# Patient Record
Sex: FEMALE | Race: WHITE | NOT HISPANIC OR LATINO | Employment: OTHER
[De-identification: names, ages, dates, MRNs, and addresses within clinical notes are randomized per-mention and may not be internally consistent; named-entity substitution may affect disease eponyms.]

---

## 2017-01-12 PROCEDURE — 80053 COMPREHEN METABOLIC PANEL: CPT | Performed by: INTERNAL MEDICINE

## 2017-01-12 PROCEDURE — 36415 COLL VENOUS BLD VENIPUNCTURE: CPT | Performed by: INTERNAL MEDICINE

## 2017-01-12 PROCEDURE — 85025 COMPLETE CBC W/AUTO DIFF WBC: CPT | Performed by: INTERNAL MEDICINE

## 2017-01-12 PROCEDURE — 83036 HEMOGLOBIN GLYCOSYLATED A1C: CPT | Performed by: INTERNAL MEDICINE

## 2017-01-20 PROBLEM — E11.9 TYPE 2 DIABETES MELLITUS WITHOUT COMPLICATION, WITHOUT LONG-TERM CURRENT USE OF INSULIN (HCC): Status: ACTIVE | Noted: 2017-01-20

## 2017-06-08 ENCOUNTER — HOSPITAL (OUTPATIENT)
Dept: OTHER | Age: 67
End: 2017-06-08
Attending: EMERGENCY MEDICINE

## 2017-06-08 LAB
ALBUMIN SERPL-MCNC: 3.8 GM/DL (ref 3.6–5.1)
ALBUMIN/GLOB SERPL: 1.1 {RATIO} (ref 1–2.4)
ALP SERPL-CCNC: 227 UNIT/L (ref 45–117)
ALT SERPL-CCNC: 618 UNIT/L
ANALYZER ANC (IANC): NORMAL
ANION GAP SERPL CALC-SCNC: 12 MMOL/L (ref 10–20)
ANNOTATION COMMENT IMP: NORMAL
ANNOTATION COMMENT IMP: NORMAL
AST SERPL-CCNC: 410 UNIT/L
BASOPHILS # BLD: 0 THOUSAND/MCL (ref 0–0.3)
BASOPHILS NFR BLD: 0 %
BILIRUB SERPL-MCNC: 1 MG/DL (ref 0.2–1)
BUN SERPL-MCNC: 10 MG/DL (ref 6–20)
BUN/CREAT SERPL: 19 (ref 7–25)
CALCIUM SERPL-MCNC: 9.6 MG/DL (ref 8.4–10.2)
CHLORIDE: 103 MMOL/L (ref 98–107)
CO2 SERPL-SCNC: 29 MMOL/L (ref 21–32)
CREAT SERPL-MCNC: 0.54 MG/DL (ref 0.51–0.95)
DIFFERENTIAL METHOD BLD: NORMAL
EOSINOPHIL # BLD: 0.1 THOUSAND/MCL (ref 0.1–0.5)
EOSINOPHIL NFR BLD: 2 %
ERYTHROCYTE [DISTWIDTH] IN BLOOD: 12.4 % (ref 11–15)
GLOBULIN SER-MCNC: 3.6 GM/DL (ref 2–4)
GLUCOSE SERPL-MCNC: 144 MG/DL (ref 65–99)
HAV IGM SER QL: NEGATIVE
HBV CORE IGM SER QL: NEGATIVE
HBV SURFACE AG SER QL: NEGATIVE
HCV AB SERPL QL IA: NEGATIVE
HEMATOCRIT: 41.2 % (ref 36–46.5)
HGB BLD-MCNC: 14.3 GM/DL (ref 12–15.5)
LYMPHOCYTES # BLD: 2.3 THOUSAND/MCL (ref 1–4)
LYMPHOCYTES NFR BLD: 41 %
MCH RBC QN AUTO: 29.5 PG (ref 26–34)
MCHC RBC AUTO-ENTMCNC: 34.7 GM/DL (ref 32–36.5)
MCV RBC AUTO: 85.1 FL (ref 78–100)
MONOCYTES # BLD: 0.5 THOUSAND/MCL (ref 0.3–0.9)
MONOCYTES NFR BLD: 9 %
NEUTROPHILS # BLD: 2.6 THOUSAND/MCL (ref 1.8–7.7)
NEUTROPHILS NFR BLD: 48 %
NEUTS SEG NFR BLD: NORMAL %
PERCENT NRBC: NORMAL
PLATELET # BLD: 252 THOUSAND/MCL (ref 140–450)
POTASSIUM SERPL-SCNC: 3.9 MMOL/L (ref 3.4–5.1)
PROT SERPL-MCNC: 7.4 GM/DL (ref 6.4–8.2)
RBC # BLD: 4.84 MILLION/MCL (ref 4–5.2)
SODIUM SERPL-SCNC: 140 MMOL/L (ref 135–145)
WBC # BLD: 5.5 THOUSAND/MCL (ref 4.2–11)

## 2017-06-15 PROCEDURE — 80076 HEPATIC FUNCTION PANEL: CPT | Performed by: INTERNAL MEDICINE

## 2017-06-15 PROCEDURE — 36415 COLL VENOUS BLD VENIPUNCTURE: CPT | Performed by: INTERNAL MEDICINE

## 2017-07-26 ENCOUNTER — HOSPITAL (OUTPATIENT)
Dept: OTHER | Age: 67
End: 2017-07-26
Attending: INTERNAL MEDICINE

## 2017-07-26 LAB — GLUCOSE BLDC GLUCOMTR-MCNC: 169 MG/DL (ref 65–99)

## 2017-07-27 ENCOUNTER — CHARTING TRANS (OUTPATIENT)
Dept: OTHER | Age: 67
End: 2017-07-27

## 2017-10-04 PROBLEM — K86.2 PANCREAS CYST (HCC): Status: ACTIVE | Noted: 2017-10-04

## 2017-10-04 PROBLEM — K52.9 CHRONIC DIARRHEA: Status: ACTIVE | Noted: 2017-10-04

## 2017-10-04 PROBLEM — K86.2 PANCREAS CYST: Status: ACTIVE | Noted: 2017-10-04

## 2018-01-26 ENCOUNTER — OFFICE VISIT (OUTPATIENT)
Dept: OTOLARYNGOLOGY | Facility: CLINIC | Age: 68
End: 2018-01-26

## 2018-01-26 VITALS
SYSTOLIC BLOOD PRESSURE: 156 MMHG | DIASTOLIC BLOOD PRESSURE: 84 MMHG | WEIGHT: 158 LBS | BODY MASS INDEX: 29.83 KG/M2 | TEMPERATURE: 97 F | HEIGHT: 61 IN

## 2018-01-26 DIAGNOSIS — R04.0 EPISTAXIS: Primary | ICD-10-CM

## 2018-01-26 PROCEDURE — 30901 CONTROL OF NOSEBLEED: CPT | Performed by: OTOLARYNGOLOGY

## 2018-01-26 PROCEDURE — 99203 OFFICE O/P NEW LOW 30 MIN: CPT | Performed by: OTOLARYNGOLOGY

## 2018-01-26 RX ORDER — DILTIAZEM HYDROCHLORIDE 240 MG/1
CAPSULE, EXTENDED RELEASE ORAL
COMMUNITY
Start: 2018-01-22 | End: 2019-06-14

## 2018-01-26 RX ORDER — LACTOBACIL 2/BIFIDO 1/S.THERMO 450B CELL
PACKET (EA) ORAL DAILY
COMMUNITY
Start: 2018-01-22

## 2018-01-26 NOTE — PROGRESS NOTES
Verena Martinez is a 69year old female. Patient presents with:  Epistaxis: nose bleed at her left nostril for a week    HPI:   She was last seen in 2011 following episodes of recurrent epistaxis from the right side of her nose.   She had done very well by mouth daily.  Disp:  Rfl:    Triamterene-HCTZ (DYAZIDE) 37.5-25 MG Oral Cap TAKE ONE CAPSULE BY MOUTH ONCE DAILY IN THE MORNING AS NEEDED FOR EDEMA Disp: 90 capsule Rfl: 0   CARTIA  MG Oral Capsule SR 24 Hr  Disp:  Rfl:    Probiotic Product (VSL#3) Right: Normal, Left: Normal. Pupil - Right: Normal, Left: Normal.    Ears Normal Inspection - Right: Normal, Left: Normal. Canal - Left: Normal. TM - Right: Normal, Left: Normal.     Procedure:  After informed consent obtained, topical anesthesia consistin

## 2018-02-12 ENCOUNTER — OFFICE VISIT (OUTPATIENT)
Dept: OTOLARYNGOLOGY | Facility: CLINIC | Age: 68
End: 2018-02-12

## 2018-02-12 VITALS
WEIGHT: 158 LBS | SYSTOLIC BLOOD PRESSURE: 158 MMHG | BODY MASS INDEX: 29.83 KG/M2 | DIASTOLIC BLOOD PRESSURE: 89 MMHG | TEMPERATURE: 98 F | HEIGHT: 61 IN

## 2018-02-12 DIAGNOSIS — R04.0 EPISTAXIS: Primary | ICD-10-CM

## 2018-02-12 PROCEDURE — 30901 CONTROL OF NOSEBLEED: CPT | Performed by: OTOLARYNGOLOGY

## 2018-02-12 PROCEDURE — 99213 OFFICE O/P EST LOW 20 MIN: CPT | Performed by: OTOLARYNGOLOGY

## 2018-02-12 NOTE — PROGRESS NOTES
Verena Martinez is a 69year old female. Patient presents with:   Follow - Up: 3 week follow up- nose bleed at her left nostril- per pt she 2x of nose bleed since 1/26/18    HPI:   She started having bleeding again from the left side of her nose over the ONCE DAILY IN THE MORNING AS NEEDED FOR EDEMA Disp: 90 capsule Rfl: 0      Past Medical History:   Diagnosis Date   • Chronic diarrhea 10/4/2017   • Other and unspecified hyperlipidemia    • Pancreas cyst 10/4/2017   • Type II or unspecified type diabetes - Left: Normal. TM - Right: Normal, Left: Normal.     Procedure:  After informed consent obtained, topical anesthesia consisting of lidocaine with epinephrine was placed into the left nasal cavity.  Silver nitrate was used to cauterize the left bleeding sit

## 2018-04-27 PROCEDURE — 87186 SC STD MICRODIL/AGAR DIL: CPT | Performed by: INTERNAL MEDICINE

## 2018-04-27 PROCEDURE — 87086 URINE CULTURE/COLONY COUNT: CPT | Performed by: INTERNAL MEDICINE

## 2018-04-27 PROCEDURE — 87184 SC STD DISK METHOD PER PLATE: CPT | Performed by: INTERNAL MEDICINE

## 2018-04-27 PROCEDURE — 81001 URINALYSIS AUTO W/SCOPE: CPT | Performed by: INTERNAL MEDICINE

## 2018-04-27 PROCEDURE — 87088 URINE BACTERIA CULTURE: CPT | Performed by: INTERNAL MEDICINE

## 2018-05-16 PROCEDURE — 87086 URINE CULTURE/COLONY COUNT: CPT | Performed by: INTERNAL MEDICINE

## 2018-05-16 PROCEDURE — 81001 URINALYSIS AUTO W/SCOPE: CPT | Performed by: INTERNAL MEDICINE

## 2018-05-16 PROCEDURE — 87077 CULTURE AEROBIC IDENTIFY: CPT | Performed by: INTERNAL MEDICINE

## 2018-10-24 PROBLEM — E11.319 DIABETIC RETINOPATHY OF LEFT EYE (HCC): Status: ACTIVE | Noted: 2018-10-01

## 2018-10-29 ENCOUNTER — OFFICE VISIT (OUTPATIENT)
Dept: FAMILY MEDICINE CLINIC | Facility: CLINIC | Age: 68
End: 2018-10-29
Payer: MEDICARE

## 2018-10-29 VITALS
HEART RATE: 94 BPM | TEMPERATURE: 98 F | RESPIRATION RATE: 15 BRPM | OXYGEN SATURATION: 98 % | DIASTOLIC BLOOD PRESSURE: 82 MMHG | SYSTOLIC BLOOD PRESSURE: 148 MMHG

## 2018-10-29 DIAGNOSIS — R39.9 UTI SYMPTOMS: Primary | ICD-10-CM

## 2018-10-29 PROCEDURE — 87186 SC STD MICRODIL/AGAR DIL: CPT | Performed by: PHYSICIAN ASSISTANT

## 2018-10-29 PROCEDURE — 87088 URINE BACTERIA CULTURE: CPT | Performed by: PHYSICIAN ASSISTANT

## 2018-10-29 PROCEDURE — 87086 URINE CULTURE/COLONY COUNT: CPT | Performed by: PHYSICIAN ASSISTANT

## 2018-10-29 PROCEDURE — 99213 OFFICE O/P EST LOW 20 MIN: CPT | Performed by: PHYSICIAN ASSISTANT

## 2018-10-29 RX ORDER — NITROFURANTOIN 25; 75 MG/1; MG/1
100 CAPSULE ORAL 2 TIMES DAILY
Qty: 10 CAPSULE | Refills: 0 | Status: SHIPPED | OUTPATIENT
Start: 2018-10-29 | End: 2018-11-03

## 2018-10-29 NOTE — PROGRESS NOTES
CHIEF COMPLAINT:   Patient presents with:  Dysuria      HPI:   Verena Martinez is a 77year old female who presents with symptoms of UTI. Complaining of urinary frequency, urgency, dysuria for last 1 days. Symptoms have been worsening since onset.   Shade An Glucose Blood (ACCU-CHEK TYRONE PLUS) In Vitro Strip Test Blood Sugar Once Daily. Non-Insulin Dependent Diabetes Type II. E11.9. Disp: 100 strip Rfl: 3   Blood Glucose Monitoring Suppl (ACCU-CHEK TYRONE) Does not apply Device Test Blood Sugar Once Daily.  Non EYES:denies blurred vision or double vision  HEENT: no congestion, rhinorrhea, sore throat or ear pain  CARDIOVASCULAR: denies chest pain or palpitations  LUNGS: denies shortness of breath, cough, or wheezing  GI: See HPI. No N/V/C/D. : See HPI.   NEURO If you are a woman, it is important to:   Keep the area around your vagina clean. Wipe from front to back after you go to the bathroom. Gently wash the area around your vagina when you bathe or shower. Wear cotton underwear.    Use pantyhose with cott Bladder infections are not contagious. You can't get one from someone else, from a toilet seat, or from sharing a bath. The most common cause of bladder infections is bacteria from the bowels.  The bacteria get onto the skin around the opening of the ureth · Proper cleaning after going to the bathroom is important. Wipe from front to back after using the toilet to prevent the spread of bacteria. · Urinate more often. Don't try to hold urine in for a long time.   · Wear loose-fitting clothes and cotton underw © 5335-1200 The [address]. [address] [facility], [address] [place]. All rights reserved. This information is not intended as a substitute for professional medical care. Always follow your healthcare professional's instructions.               Jose Alberto Powers

## 2018-10-29 NOTE — PATIENT INSTRUCTIONS
-Push fluids- gatorade, water, cranberry juice.  -Will call in 1-3 days with urine culture results  -If have increased urinary urgency, urinary frequency, blood in urine, fevers, chills, sweats, back pain, or abdominal pain, please seek medical care immedi urine  · Abdominal discomfort. This is usually in the lower abdomen above the pubic bone.   · Cloudy urine  · Strong- or bad-smelling urine  · Unable to urinate (urinary retention)  · Unable to hold urine in (urinary incontinence)  · Fever  · Loss of appeti clothing. Care and prevention  These self-care steps can help prevent future infections:  · Drink plenty of fluids to prevent dehydration and flush out your bladder.  Do this unless you must restrict fluids for other health reasons, or your doctor told you (labia)  Date Last Reviewed: 10/1/2016  © 1152-8128 [employer] [address]. [address] [facility] [place], [address] [place]. All rights reserved. This information is not intended as a substitute for professional medical care.  Always follow your healthcare pro

## 2018-10-31 ENCOUNTER — TELEPHONE (OUTPATIENT)
Dept: FAMILY MEDICINE CLINIC | Facility: CLINIC | Age: 68
End: 2018-10-31

## 2018-10-31 NOTE — TELEPHONE ENCOUNTER
Spoke with patient, informed her that her culture revealsed e-coli in urine and that prescribed medication will be eradicate  the bacteria.  Educated patient to continue to maintain hydration with water, avoiding caffeine, alcohol and carbonated beverages w

## 2018-11-23 ENCOUNTER — OFFICE VISIT (OUTPATIENT)
Dept: FAMILY MEDICINE CLINIC | Facility: CLINIC | Age: 68
End: 2018-11-23
Payer: MEDICARE

## 2018-11-23 VITALS
RESPIRATION RATE: 18 BRPM | TEMPERATURE: 98 F | OXYGEN SATURATION: 98 % | DIASTOLIC BLOOD PRESSURE: 84 MMHG | BODY MASS INDEX: 27.49 KG/M2 | WEIGHT: 161 LBS | HEART RATE: 90 BPM | SYSTOLIC BLOOD PRESSURE: 130 MMHG | HEIGHT: 64 IN

## 2018-11-23 DIAGNOSIS — J01.00 ACUTE NON-RECURRENT MAXILLARY SINUSITIS: Primary | ICD-10-CM

## 2018-11-23 DIAGNOSIS — Z87.891 FORMER SMOKER: ICD-10-CM

## 2018-11-23 PROCEDURE — 99213 OFFICE O/P EST LOW 20 MIN: CPT | Performed by: NURSE PRACTITIONER

## 2018-11-23 RX ORDER — AMOXICILLIN AND CLAVULANATE POTASSIUM 875; 125 MG/1; MG/1
1 TABLET, FILM COATED ORAL 2 TIMES DAILY
Qty: 20 TABLET | Refills: 0 | Status: SHIPPED | OUTPATIENT
Start: 2018-11-23 | End: 2018-12-03

## 2018-11-23 NOTE — PROGRESS NOTES
CHIEF COMPLAINT:   Patient presents with:  URI  Nasal Congestion  Chest Congestion      HPI:   Allison Cooper is a 77year old female who presents for cold symptoms for  10  days.  Symptoms started with runny nose, head congestion, body aches, headache Glucose Blood (FREESTYLE TEST) In Vitro Strip Test Blood Sugar Once Daily. Non-Insulin Dependent Diabetes Type II. E11.9. Disp: 100 strip Rfl: 3   Glucose Blood (ACCU-CHEK TYRONE PLUS) In Vitro Strip Test Blood Sugar Once Daily.  Non-Insulin Dependent Diabet • OTHER  07/2017    EUS aspiration of pancreatic cyst per Dr. Oj Muñoz at [facility]  no cytology found   • OTHER SURGICAL HISTORY      excision of lipomas       Family History   Problem Relation Age of Onset   • Cancer Mother         leukemia    • Other (CVD) Father LUNGS: clear to auscultation bilaterally, no wheezes or rhonchi. Breathing is non labored. CARDIO: RRR without murmur  EXTREMITIES: no cyanosis, clubbing or edema  LYMPH:  No cervical lymphadenopathy. NEURO:  No focal deficits.     ASSESSMENT AND PLAN: · Cepacol lozenges or Chloroseptic throat spray (active ingredient Benzocaine) may help soothe throat during the day. · Follow up in a few days or sooner if worsening symptoms. Seek immediate care if inability to swallow or breathe.     Sinusitis (Antibiot · You can use an over-the-counter decongestant, unless a similar medicine was prescribed to you. Nasal sprays work the fastest. Use one that contains phenylephrine or oxymetazoline. First blow your nose gently. Then use the spray.  Do not use these medicine · Don’t have close contact with people who have sore throats, colds, or other upper respiratory infections. · Don’t smoke, and stay away from secondhand smoke. · Stay up to date with of your vaccines.   Date Last Reviewed: 11/1/2017  © 7868-8702 The [employer] Your doctor may prescribe medications to help treat your sinusitis. If you have an infection, antibiotics can help clear it up. If you are prescribed antibiotics, take all pills on schedule until they are gone, even if you feel better.  Decongestants help r

## 2018-11-23 NOTE — PATIENT INSTRUCTIONS
· Take antibiotics as directed. Finish all the medication even if you feel better. · Probiotics or yogurt daily during antibiotic use will help decrease stomach upset and restore good bacteria to the gut. Separate times by at least 2-4 hours.   · Continue may help thin nasal mucus. It also may help your sinuses drain fluids. · Heat may help soothe painful areas of your face. Use a towel soaked in hot water. Or,  the shower and direct the warm spray onto your face.  Using a vaporizer along with a men worse  · Stiff neck  · Unusual drowsiness or confusion  · Swelling of your forehead or eyelids  · Vision problems, such as blurred or double vision  · Fever of 100.4ºF (38ºC) or higher, or as directed by your healthcare provider  · Seizure  · Breathing pro comfortable. Use a hot water bottle or a hand towel dipped in hot water. Some people also find ice packs effective for relieving pain. Medicines  Your doctor may prescribe medications to help treat your sinusitis.  If you have an infection, antibiotics can

## 2018-12-08 ENCOUNTER — OFFICE VISIT (OUTPATIENT)
Dept: FAMILY MEDICINE CLINIC | Facility: CLINIC | Age: 68
End: 2018-12-08
Payer: MEDICARE

## 2018-12-08 VITALS
WEIGHT: 160 LBS | BODY MASS INDEX: 30.21 KG/M2 | SYSTOLIC BLOOD PRESSURE: 150 MMHG | HEART RATE: 86 BPM | TEMPERATURE: 98 F | HEIGHT: 61 IN | DIASTOLIC BLOOD PRESSURE: 90 MMHG | OXYGEN SATURATION: 98 % | RESPIRATION RATE: 16 BRPM

## 2018-12-08 DIAGNOSIS — N30.01 ACUTE CYSTITIS WITH HEMATURIA: Primary | ICD-10-CM

## 2018-12-08 PROCEDURE — 99213 OFFICE O/P EST LOW 20 MIN: CPT | Performed by: NURSE PRACTITIONER

## 2018-12-08 PROCEDURE — 87186 SC STD MICRODIL/AGAR DIL: CPT | Performed by: NURSE PRACTITIONER

## 2018-12-08 PROCEDURE — 81003 URINALYSIS AUTO W/O SCOPE: CPT | Performed by: NURSE PRACTITIONER

## 2018-12-08 PROCEDURE — 87086 URINE CULTURE/COLONY COUNT: CPT | Performed by: NURSE PRACTITIONER

## 2018-12-08 PROCEDURE — 87088 URINE BACTERIA CULTURE: CPT | Performed by: NURSE PRACTITIONER

## 2018-12-08 RX ORDER — NITROFURANTOIN 25; 75 MG/1; MG/1
100 CAPSULE ORAL 2 TIMES DAILY
Qty: 10 CAPSULE | Refills: 0 | Status: SHIPPED | OUTPATIENT
Start: 2018-12-08 | End: 2018-12-13

## 2018-12-08 NOTE — PROGRESS NOTES
CHIEF COMPLAINT:   Patient presents with:  Dysuria      HPI:   Tyrell Rosen is a 77year old female who presents with symptoms of UTI. Complaining of urinary frequency, urgency, dysuria sicne 1p.m. today. Symptoms have been worsening since onset. Glucose Blood (ACCU-CHEK TYRONE) In Vitro Strip Test Blood Sugar Once Daily. Non-Insulin Dependent Diabetes Type II. E11.9. Disp: 100 strip Rfl: 3   ACCU-CHEK SOFTCLIX LANCETS Does not apply Misc Test Blood Sugar Once Daily.  Non-Insulin Dependent Diabetes T GI: See HPI. No N/V/C/D. : See HPI.       EXAM:   /90   Pulse 86   Temp 97.7 °F (36.5 °C)   Resp 16   Ht 61\"   Wt 160 lb   SpO2 98%   BMI 30.23 kg/m²   GENERAL: well developed, well nourished,in no apparent distress  CARDIO: RRR, no murmurs  LUNG Sig: Take 1 capsule (100 mg total) by mouth 2 (two) times daily for 5 days. Risk and benefits of medication discussed. Stressed importance of completing full course of antibiotic.      Patient Instructions       Understanding Urinary Tract Infection

## 2018-12-19 ENCOUNTER — OFFICE VISIT (OUTPATIENT)
Dept: SURGERY | Facility: CLINIC | Age: 68
End: 2018-12-19
Payer: MEDICARE

## 2018-12-19 VITALS
TEMPERATURE: 99 F | HEIGHT: 61 IN | WEIGHT: 160 LBS | SYSTOLIC BLOOD PRESSURE: 150 MMHG | HEART RATE: 64 BPM | DIASTOLIC BLOOD PRESSURE: 68 MMHG | BODY MASS INDEX: 30.21 KG/M2

## 2018-12-19 DIAGNOSIS — N39.46 MIXED STRESS AND URGE URINARY INCONTINENCE: ICD-10-CM

## 2018-12-19 DIAGNOSIS — N39.0 FREQUENT UTI: Primary | ICD-10-CM

## 2018-12-19 PROCEDURE — 81003 URINALYSIS AUTO W/O SCOPE: CPT | Performed by: NURSE PRACTITIONER

## 2018-12-19 PROCEDURE — 99204 OFFICE O/P NEW MOD 45 MIN: CPT | Performed by: NURSE PRACTITIONER

## 2018-12-19 PROCEDURE — G0463 HOSPITAL OUTPT CLINIC VISIT: HCPCS | Performed by: NURSE PRACTITIONER

## 2018-12-19 RX ORDER — NITROFURANTOIN 25; 75 MG/1; MG/1
CAPSULE ORAL
Qty: 14 CAPSULE | Refills: 1 | Status: SHIPPED | OUTPATIENT
Start: 2018-12-19 | End: 2019-01-08

## 2018-12-19 NOTE — PROGRESS NOTES
HPI:    Patient ID: Manuel Young is a 77year old female. Patient is a 77year old female who presents to the clinic for a consult regarding frequent UTI. Patient states she has had 4 UTIs in the past year.   She suffers from irritable bowel disea SR 24 Hr TAKE ONE CAPSULE BY MOUTH ONCE DAILY Disp: 90 capsule Rfl: 3   DilTIAZem HCl  MG Oral Capsule SR 24 Hr TAKE ONE CAPSULE BY MOUTH ONCE DAILY IN THE MORNING Disp: 90 capsule Rfl: 3   estradiol 0.5 MG Oral Tab TAKE ONE TABLET BY MOUTH ONCE WALLACE mouth. Disp:  Rfl:    Triamterene-HCTZ (DYAZIDE) 37.5-25 MG Oral Cap TAKE ONE CAPSULE BY MOUTH ONCE DAILY IN THE MORNING AS NEEDED FOR EDEMA Disp: 90 capsule Rfl: 0     Allergies:  Sulfa Antibiotics       HIVES  Ditropan [Oxybutyni*    HIVES, RASH  Bactrim Neck: Normal range of motion. Cardiovascular: Normal rate. No murmur heard. Pulmonary/Chest: Effort normal and breath sounds normal. No respiratory distress. Abdominal: Soft. Bowel sounds are normal. There is no tenderness.    Genitourinary:   Esther Monohyd Macro 100 MG Oral Cap 14 capsule 1     Sig: Take 1 capsule by mouth twice daily x 7 days whenever you have symptoms of a urinary tract infection. Please submit urine to lab prior.        Imaging & Referrals:  [facility]/BLADDER (CPT=[id number])        ID

## 2018-12-24 ENCOUNTER — APPOINTMENT (OUTPATIENT)
Dept: LAB | Facility: HOSPITAL | Age: 68
End: 2018-12-24
Attending: NURSE PRACTITIONER
Payer: MEDICARE

## 2018-12-24 DIAGNOSIS — N39.46 MIXED STRESS AND URGE URINARY INCONTINENCE: ICD-10-CM

## 2018-12-24 DIAGNOSIS — N39.0 FREQUENT UTI: ICD-10-CM

## 2018-12-24 PROCEDURE — 87086 URINE CULTURE/COLONY COUNT: CPT

## 2018-12-24 PROCEDURE — 87077 CULTURE AEROBIC IDENTIFY: CPT

## 2018-12-24 PROCEDURE — 87186 SC STD MICRODIL/AGAR DIL: CPT

## 2018-12-24 PROCEDURE — 81001 URINALYSIS AUTO W/SCOPE: CPT

## 2018-12-26 ENCOUNTER — TELEPHONE (OUTPATIENT)
Dept: SURGERY | Facility: CLINIC | Age: 68
End: 2018-12-26

## 2018-12-26 RX ORDER — CEPHALEXIN 500 MG/1
500 CAPSULE ORAL 2 TIMES DAILY
Qty: 14 CAPSULE | Refills: 0 | Status: SHIPPED | OUTPATIENT
Start: 2018-12-26 | End: 2019-01-02

## 2018-12-26 NOTE — TELEPHONE ENCOUNTER
Staff,    Please let patient know her urine culture is positive for UTI. It does have some antibiotic resistance. We will plan to treat with Keflex 500 mg BID x 7 days.      Thank you,  Elaine OVALLE

## 2018-12-26 NOTE — TELEPHONE ENCOUNTER
Was asked by JULIA, to speak with pt. Spoke with pt. She states she received a call from her pharmacy and did not know why. Read to her APN's note as outlined below. She verbalized understanding. She states she is currently taking Nitrofurantoin.  Upon review

## 2019-01-03 ENCOUNTER — HOSPITAL ENCOUNTER (OUTPATIENT)
Dept: ULTRASOUND IMAGING | Facility: HOSPITAL | Age: 69
Discharge: HOME | End: 2019-01-03
Attending: NURSE PRACTITIONER
Payer: MEDICARE

## 2019-01-03 DIAGNOSIS — N39.46 MIXED STRESS AND URGE URINARY INCONTINENCE: ICD-10-CM

## 2019-01-03 DIAGNOSIS — N39.0 FREQUENT UTI: ICD-10-CM

## 2019-01-03 PROCEDURE — 76770 US EXAM ABDO BACK WALL COMP: CPT | Performed by: NURSE PRACTITIONER

## 2019-01-06 ENCOUNTER — APPOINTMENT (OUTPATIENT)
Dept: LAB | Facility: HOSPITAL | Age: 69
End: 2019-01-06
Attending: NURSE PRACTITIONER
Payer: MEDICARE

## 2019-01-06 DIAGNOSIS — N39.0 FREQUENT UTI: ICD-10-CM

## 2019-01-06 DIAGNOSIS — N39.46 MIXED STRESS AND URGE URINARY INCONTINENCE: ICD-10-CM

## 2019-01-06 LAB
BILIRUB UR QL: NEGATIVE
GLUCOSE UR-MCNC: NEGATIVE MG/DL
KETONES UR-MCNC: NEGATIVE MG/DL
NITRITE UR QL STRIP.AUTO: NEGATIVE
PH UR: 5 [PH] (ref 5–8)
PROT UR-MCNC: 100 MG/DL
RBC #/AREA URNS AUTO: 3835 /HPF
SP GR UR STRIP: 1.02 (ref 1–1.03)
UROBILINOGEN UR STRIP-ACNC: <2
VIT C UR-MCNC: 20 MG/DL
WBC #/AREA URNS AUTO: 612 /HPF

## 2019-01-06 PROCEDURE — 81001 URINALYSIS AUTO W/SCOPE: CPT

## 2019-01-06 PROCEDURE — 87088 URINE BACTERIA CULTURE: CPT

## 2019-01-06 PROCEDURE — 87186 SC STD MICRODIL/AGAR DIL: CPT

## 2019-01-06 PROCEDURE — 87086 URINE CULTURE/COLONY COUNT: CPT

## 2019-01-07 ENCOUNTER — TELEPHONE (OUTPATIENT)
Dept: SURGERY | Facility: CLINIC | Age: 69
End: 2019-01-07

## 2019-01-08 RX ORDER — NITROFURANTOIN 25; 75 MG/1; MG/1
100 CAPSULE ORAL NIGHTLY
Qty: 90 CAPSULE | Refills: 1 | Status: SHIPPED | OUTPATIENT
Start: 2019-01-08 | End: 2019-06-14

## 2019-01-08 NOTE — TELEPHONE ENCOUNTER
Patient with positive UTI. This is her 6 UTI in the past year. She has started Macrobid as prescribed at [address] [place] [address] and states her symptoms are improving. I discussed a trial of suppressive therapy with Macrobid. Patient with IBS and bouts of diarrhea.   Radha Ireland

## 2019-03-08 ENCOUNTER — TELEPHONE (OUTPATIENT)
Dept: SURGERY | Facility: CLINIC | Age: 69
End: 2019-03-08

## 2019-03-08 DIAGNOSIS — N39.0 RECURRENT UTI: Primary | ICD-10-CM

## 2019-03-08 NOTE — TELEPHONE ENCOUNTER
Pt wants APN to know that she is almost done taking her antibiotic, and she wants to know if a valid order to get urine test can be entered in Epic?

## 2019-04-07 ENCOUNTER — APPOINTMENT (OUTPATIENT)
Dept: LAB | Facility: HOSPITAL | Age: 69
End: 2019-04-07
Attending: NURSE PRACTITIONER
Payer: MEDICARE

## 2019-04-07 DIAGNOSIS — N39.0 RECURRENT UTI: ICD-10-CM

## 2019-04-07 PROCEDURE — 87077 CULTURE AEROBIC IDENTIFY: CPT

## 2019-04-07 PROCEDURE — 87086 URINE CULTURE/COLONY COUNT: CPT

## 2019-04-07 PROCEDURE — 81001 URINALYSIS AUTO W/SCOPE: CPT

## 2019-04-07 PROCEDURE — 87186 SC STD MICRODIL/AGAR DIL: CPT

## 2019-04-08 ENCOUNTER — TELEPHONE (OUTPATIENT)
Dept: SURGERY | Facility: CLINIC | Age: 69
End: 2019-04-08

## 2019-04-09 ENCOUNTER — TELEPHONE (OUTPATIENT)
Dept: SURGERY | Facility: CLINIC | Age: 69
End: 2019-04-09

## 2019-04-09 DIAGNOSIS — N39.0 RECURRENT UTI: Primary | ICD-10-CM

## 2019-04-09 NOTE — TELEPHONE ENCOUNTER
Patient urine culture positive for klebsiella pneumoniae. She has been on suppressive therapy with Macrobid. She had stopped Macrobid to obtain culture. She started to have symptoms on Sunday prompting her to submit a urine sample.   She restarted Macro

## 2019-04-17 ENCOUNTER — OFFICE VISIT (OUTPATIENT)
Dept: SURGERY | Facility: CLINIC | Age: 69
End: 2019-04-17
Payer: MEDICARE

## 2019-04-17 VITALS
SYSTOLIC BLOOD PRESSURE: 164 MMHG | WEIGHT: 156 LBS | HEART RATE: 93 BPM | BODY MASS INDEX: 29 KG/M2 | TEMPERATURE: 98 F | DIASTOLIC BLOOD PRESSURE: 98 MMHG

## 2019-04-17 DIAGNOSIS — R35.0 URINARY FREQUENCY: ICD-10-CM

## 2019-04-17 DIAGNOSIS — N39.46 MIXED INCONTINENCE: ICD-10-CM

## 2019-04-17 DIAGNOSIS — N39.0 RECURRENT UTI: Primary | ICD-10-CM

## 2019-04-17 PROCEDURE — 99213 OFFICE O/P EST LOW 20 MIN: CPT | Performed by: NURSE PRACTITIONER

## 2019-04-17 PROCEDURE — G0463 HOSPITAL OUTPT CLINIC VISIT: HCPCS | Performed by: NURSE PRACTITIONER

## 2019-04-17 NOTE — PROGRESS NOTES
HPI:    Patient ID: Rosangela Zheng is a 77year old female. HPI     Patient is a 77year old female who presents to the clinic for a follow up regarding frequent UTI.   Patient was last seen in the office 12/19/18 with a history of 4 UTIs over the la BY MOUTH EVERY 4 TO 6 HOURS AS NEEDED Disp: 15 capsule Rfl: 2   METFORMIN  MG Oral Tab TAKE TWO TABLETS BY MOUTH TWICE DAILY WITH MEALS Disp: 360 tablet Rfl: 3   Nitrofurantoin Monohyd Macro 100 MG Oral Cap Take 1 capsule (100 mg total) by mouth nig VITAMIN/MINERALS) Oral Tab Take by mouth.  Disp:  Rfl:      Allergies:  Sulfa Antibiotics       HIVES  Ditropan [Oxybutyni*    HIVES, RASH  Bactrim [Sulfametho*    MYALGIA  Prednisone              HIVES    HISTORY:  Past Medical History:   Diagnosis Date distress. Musculoskeletal: Normal range of motion. Neurological: She is alert and oriented to person, place, and time. Skin: Skin is warm and dry. Psychiatric: She has a normal mood and affect.             ASSESSMENT/PLAN:   Recurrent uti  (primary

## 2019-04-19 PROBLEM — N39.0 RECURRENT UTI: Status: ACTIVE | Noted: 2019-04-19

## 2019-04-19 PROBLEM — N32.81 OAB (OVERACTIVE BLADDER): Status: ACTIVE | Noted: 2019-04-19

## 2019-04-22 ENCOUNTER — OFFICE VISIT (OUTPATIENT)
Dept: SURGERY | Facility: CLINIC | Age: 69
End: 2019-04-22
Payer: MEDICARE

## 2019-04-22 VITALS
BODY MASS INDEX: 29.45 KG/M2 | WEIGHT: 156 LBS | HEART RATE: 76 BPM | HEIGHT: 61 IN | SYSTOLIC BLOOD PRESSURE: 162 MMHG | DIASTOLIC BLOOD PRESSURE: 81 MMHG

## 2019-04-22 DIAGNOSIS — N39.46 MIXED INCONTINENCE: Primary | ICD-10-CM

## 2019-04-22 DIAGNOSIS — R35.0 URINARY FREQUENCY: ICD-10-CM

## 2019-04-22 DIAGNOSIS — N39.0 RECURRENT UTI: ICD-10-CM

## 2019-04-22 PROCEDURE — G0463 HOSPITAL OUTPT CLINIC VISIT: HCPCS | Performed by: UROLOGY

## 2019-04-22 PROCEDURE — 99213 OFFICE O/P EST LOW 20 MIN: CPT | Performed by: UROLOGY

## 2019-04-22 NOTE — PROGRESS NOTES
Paty Samson is a 77year old female. HPI:   Patient presents with: Follow - Up: Patient presents today to discuss surgical options he regards to her urinary symptoms.        77year old female with baseline moderate to severe overactive bladder s 07/2017    EUS aspiration of pancreatic cyst per Dr. Donna Ferreira at [facility]  no cytology found   • OTHER SURGICAL HISTORY      excision of lipomas       Family History   Problem Relation Age of Onset   • Cancer Mother         leukemia    • Other (CVD) Father    • Othe Glucose Blood (FREESTYLE TEST) In Vitro Strip Test Blood Sugar Once Daily. Non-Insulin Dependent Diabetes Type II. E11.9. Disp: 100 strip Rfl: 3   Glucose Blood (ACCU-CHEK TYRONE PLUS) In Vitro Strip Test Blood Sugar Once Daily.  Non-Insulin Dependent Diab retention were discussed with the patient. He would like to consider those options and will call us back to schedule once she is ready.   I also told the patient that I feel that her significant incontinence is likely a contributing factor to her recurrent

## 2019-04-24 ENCOUNTER — TELEPHONE (OUTPATIENT)
Dept: SURGERY | Facility: CLINIC | Age: 69
End: 2019-04-24

## 2019-04-24 DIAGNOSIS — N32.81 OVERACTIVE BLADDER: Primary | ICD-10-CM

## 2019-04-24 NOTE — TELEPHONE ENCOUNTER
Dr. Elli Townsend please see telephone encounter, please route surgery request, for cysto, botox injection, so that I can get the patient scheduled, patient is allergic to sulfa antibiotics, Ditropan Bactrim and prednisone.   Thanks [employer]

## 2019-05-03 ENCOUNTER — TELEPHONE (OUTPATIENT)
Dept: SURGERY | Facility: CLINIC | Age: 69
End: 2019-05-03

## 2019-05-03 DIAGNOSIS — R39.9 SYMPTOM INVOLVING BLADDER: Primary | ICD-10-CM

## 2019-05-03 NOTE — TELEPHONE ENCOUNTER
Pt called. Pt was talking to dr Malu Deras regarding the leaky bladder problem. Pt also has questions for conner medina.   Please call to advise

## 2019-05-03 NOTE — TELEPHONE ENCOUNTER
Phoned pt back and spoke with her. She states she would like to speak with the surgery scheduler to schedule her procedure. She states she is getting anxious to get scheduled. Reassured her that our surgery scheduler is aware and will be contacting her. She is thankful. She then asked if she should refill her Nitrofurantoin. Advised pt to continue on her med, as ordered, unless told otherwise. She verbalized understanding and agrees to plan. She is thankful. Eulalio Deal verbally informed. Thank you, Eulalio Deal.   (Changed message from acute to schedule surgery)

## 2019-05-06 NOTE — TELEPHONE ENCOUNTER
Spoke to patient schedule cystoscopy, Botox injection for Wednesday, June 19 at [facility] outpatient went over preop instructions with patient will mail out inform patient that urine culture is required 2 weeks prior to procedure patient andrew

## 2019-05-09 ENCOUNTER — TELEPHONE (OUTPATIENT)
Dept: SURGERY | Facility: CLINIC | Age: 69
End: 2019-05-09

## 2019-05-09 NOTE — TELEPHONE ENCOUNTER
Pt. states that her insur. , no longer covers the med for UTI and that it will cost her $143., Pt. Is not sure of the name of the med, but wants to know if APN can prescribe a med that insur will cover, or that is more cost effective for the pt. ?

## 2019-05-10 RX ORDER — CEPHALEXIN 250 MG/1
250 CAPSULE ORAL NIGHTLY
Qty: 90 CAPSULE | Refills: 1 | Status: SHIPPED | OUTPATIENT
Start: 2019-05-10 | End: 2019-11-06

## 2019-05-10 NOTE — TELEPHONE ENCOUNTER
We can try Keflex 250 mg PO nightly. I will send this to her pharmacy. It is typically covered by insurance. Please let me know if there are any further issues.     Thank you,  Desirae OVALLE  [facility]

## 2019-05-10 NOTE — TELEPHONE ENCOUNTER
Phoned pt and spoke with her. She is thankful. She states she paid to have the Nitrofurantoin filled, however will not be able to afford it in the future. Advised her to let the pharmacy know to put the keflex on hold until this rx runs out.  She agrees to

## 2019-05-31 ENCOUNTER — TELEPHONE (OUTPATIENT)
Dept: SURGERY | Facility: CLINIC | Age: 69
End: 2019-05-31

## 2019-06-04 NOTE — TELEPHONE ENCOUNTER
Patient was called today and she stated tht her PCP was waiting on our office to fax over a medical clearance request. I have sent one over today and a confirmation was received. We will await for the PCP to respond.

## 2019-06-10 ENCOUNTER — APPOINTMENT (OUTPATIENT)
Dept: LAB | Facility: HOSPITAL | Age: 69
End: 2019-06-10
Attending: NURSE PRACTITIONER
Payer: MEDICARE

## 2019-06-10 DIAGNOSIS — N39.0 RECURRENT UTI: ICD-10-CM

## 2019-06-10 PROCEDURE — 87086 URINE CULTURE/COLONY COUNT: CPT

## 2019-06-10 PROCEDURE — 81001 URINALYSIS AUTO W/SCOPE: CPT

## 2019-06-12 ENCOUNTER — TELEPHONE (OUTPATIENT)
Dept: SURGERY | Facility: CLINIC | Age: 69
End: 2019-06-12

## 2019-06-12 NOTE — TELEPHONE ENCOUNTER
Pt called stating pt is scheduled for procedure on 6-19-19. Pt had a urine test 6-11-19. Have you received the results. Pt's pcp sent over the release form for the procedure.    Call pt to advise

## 2019-06-19 ENCOUNTER — ANESTHESIA EVENT (OUTPATIENT)
Dept: SURGERY | Facility: HOSPITAL | Age: 69
End: 2019-06-19
Payer: MEDICARE

## 2019-06-19 ENCOUNTER — HOSPITAL ENCOUNTER (OUTPATIENT)
Facility: HOSPITAL | Age: 69
Setting detail: HOSPITAL OUTPATIENT SURGERY
Discharge: HOME | End: 2019-06-19
Attending: UROLOGY | Admitting: UROLOGY
Payer: MEDICARE

## 2019-06-19 ENCOUNTER — ANESTHESIA (OUTPATIENT)
Dept: SURGERY | Facility: HOSPITAL | Age: 69
End: 2019-06-19
Payer: MEDICARE

## 2019-06-19 VITALS
SYSTOLIC BLOOD PRESSURE: 120 MMHG | HEIGHT: 62 IN | HEART RATE: 58 BPM | OXYGEN SATURATION: 95 % | TEMPERATURE: 97 F | BODY MASS INDEX: 28.52 KG/M2 | DIASTOLIC BLOOD PRESSURE: 63 MMHG | RESPIRATION RATE: 18 BRPM | WEIGHT: 155 LBS

## 2019-06-19 DIAGNOSIS — N32.81 OVERACTIVE BLADDER: ICD-10-CM

## 2019-06-19 PROCEDURE — 3E0K8GC INTRODUCTION OF OTHER THERAPEUTIC SUBSTANCE INTO GENITOURINARY TRACT, VIA NATURAL OR ARTIFICIAL OPENING ENDOSCOPIC: ICD-10-PCS | Performed by: UROLOGY

## 2019-06-19 PROCEDURE — 52287 CYSTOSCOPY CHEMODENERVATION: CPT | Performed by: UROLOGY

## 2019-06-19 RX ORDER — METOCLOPRAMIDE 10 MG/1
10 TABLET ORAL ONCE
Status: DISCONTINUED | OUTPATIENT
Start: 2019-06-19 | End: 2019-06-19 | Stop reason: HOSPADM

## 2019-06-19 RX ORDER — DEXAMETHASONE SODIUM PHOSPHATE 4 MG/ML
VIAL (ML) INJECTION AS NEEDED
Status: DISCONTINUED | OUTPATIENT
Start: 2019-06-19 | End: 2019-06-19 | Stop reason: SURG

## 2019-06-19 RX ORDER — SODIUM CHLORIDE 9 MG/ML
INJECTION, SOLUTION INTRAVENOUS CONTINUOUS
Status: DISCONTINUED | OUTPATIENT
Start: 2019-06-19 | End: 2019-06-19

## 2019-06-19 RX ORDER — PROCHLORPERAZINE EDISYLATE 5 MG/ML
5 INJECTION INTRAMUSCULAR; INTRAVENOUS ONCE AS NEEDED
Status: DISCONTINUED | OUTPATIENT
Start: 2019-06-19 | End: 2019-06-19

## 2019-06-19 RX ORDER — HYDROCODONE BITARTRATE AND ACETAMINOPHEN 5; 325 MG/1; MG/1
1 TABLET ORAL AS NEEDED
Status: DISCONTINUED | OUTPATIENT
Start: 2019-06-19 | End: 2019-06-19

## 2019-06-19 RX ORDER — CEFADROXIL 500 MG/1
500 CAPSULE ORAL 2 TIMES DAILY
Qty: 6 CAPSULE | Refills: 0 | Status: SHIPPED | OUTPATIENT
Start: 2019-06-20 | End: 2019-06-23

## 2019-06-19 RX ORDER — MORPHINE SULFATE 10 MG/ML
6 INJECTION, SOLUTION INTRAMUSCULAR; INTRAVENOUS EVERY 10 MIN PRN
Status: DISCONTINUED | OUTPATIENT
Start: 2019-06-19 | End: 2019-06-19

## 2019-06-19 RX ORDER — MORPHINE SULFATE 2 MG/ML
2 INJECTION, SOLUTION INTRAMUSCULAR; INTRAVENOUS EVERY 10 MIN PRN
Status: DISCONTINUED | OUTPATIENT
Start: 2019-06-19 | End: 2019-06-19

## 2019-06-19 RX ORDER — PHENAZOPYRIDINE HYDROCHLORIDE 200 MG/1
200 TABLET, FILM COATED ORAL 3 TIMES DAILY PRN
Qty: 10 TABLET | Refills: 0 | Status: SHIPPED | OUTPATIENT
Start: 2019-06-19 | End: 2020-02-11 | Stop reason: ALTCHOICE

## 2019-06-19 RX ORDER — FAMOTIDINE 20 MG/1
20 TABLET ORAL ONCE
Status: DISCONTINUED | OUTPATIENT
Start: 2019-06-19 | End: 2019-06-19 | Stop reason: HOSPADM

## 2019-06-19 RX ORDER — ONDANSETRON 2 MG/ML
4 INJECTION INTRAMUSCULAR; INTRAVENOUS ONCE AS NEEDED
Status: DISCONTINUED | OUTPATIENT
Start: 2019-06-19 | End: 2019-06-19

## 2019-06-19 RX ORDER — PHENAZOPYRIDINE HYDROCHLORIDE 200 MG/1
200 TABLET, FILM COATED ORAL ONCE
Status: COMPLETED | OUTPATIENT
Start: 2019-06-19 | End: 2019-06-19

## 2019-06-19 RX ORDER — HYDROMORPHONE HYDROCHLORIDE 1 MG/ML
0.2 INJECTION, SOLUTION INTRAMUSCULAR; INTRAVENOUS; SUBCUTANEOUS EVERY 5 MIN PRN
Status: DISCONTINUED | OUTPATIENT
Start: 2019-06-19 | End: 2019-06-19

## 2019-06-19 RX ORDER — MIDAZOLAM HYDROCHLORIDE 1 MG/ML
INJECTION INTRAMUSCULAR; INTRAVENOUS AS NEEDED
Status: DISCONTINUED | OUTPATIENT
Start: 2019-06-19 | End: 2019-06-19 | Stop reason: SURG

## 2019-06-19 RX ORDER — NALOXONE HYDROCHLORIDE 0.4 MG/ML
80 INJECTION, SOLUTION INTRAMUSCULAR; INTRAVENOUS; SUBCUTANEOUS AS NEEDED
Status: DISCONTINUED | OUTPATIENT
Start: 2019-06-19 | End: 2019-06-19

## 2019-06-19 RX ORDER — HYDROMORPHONE HYDROCHLORIDE 1 MG/ML
0.6 INJECTION, SOLUTION INTRAMUSCULAR; INTRAVENOUS; SUBCUTANEOUS EVERY 5 MIN PRN
Status: DISCONTINUED | OUTPATIENT
Start: 2019-06-19 | End: 2019-06-19

## 2019-06-19 RX ORDER — DEXTROSE MONOHYDRATE 25 G/50ML
50 INJECTION, SOLUTION INTRAVENOUS
Status: DISCONTINUED | OUTPATIENT
Start: 2019-06-19 | End: 2019-06-19

## 2019-06-19 RX ORDER — HYDROCODONE BITARTRATE AND ACETAMINOPHEN 5; 325 MG/1; MG/1
2 TABLET ORAL AS NEEDED
Status: DISCONTINUED | OUTPATIENT
Start: 2019-06-19 | End: 2019-06-19

## 2019-06-19 RX ORDER — ACETAMINOPHEN 500 MG
1000 TABLET ORAL ONCE
Status: COMPLETED | OUTPATIENT
Start: 2019-06-19 | End: 2019-06-19

## 2019-06-19 RX ORDER — SODIUM CHLORIDE, SODIUM LACTATE, POTASSIUM CHLORIDE, CALCIUM CHLORIDE 600; 310; 30; 20 MG/100ML; MG/100ML; MG/100ML; MG/100ML
INJECTION, SOLUTION INTRAVENOUS CONTINUOUS
Status: DISCONTINUED | OUTPATIENT
Start: 2019-06-19 | End: 2019-06-19

## 2019-06-19 RX ORDER — HALOPERIDOL 5 MG/ML
0.25 INJECTION INTRAMUSCULAR ONCE AS NEEDED
Status: DISCONTINUED | OUTPATIENT
Start: 2019-06-19 | End: 2019-06-19

## 2019-06-19 RX ORDER — MORPHINE SULFATE 4 MG/ML
4 INJECTION, SOLUTION INTRAMUSCULAR; INTRAVENOUS EVERY 10 MIN PRN
Status: DISCONTINUED | OUTPATIENT
Start: 2019-06-19 | End: 2019-06-19

## 2019-06-19 RX ORDER — HYDROMORPHONE HYDROCHLORIDE 1 MG/ML
0.4 INJECTION, SOLUTION INTRAMUSCULAR; INTRAVENOUS; SUBCUTANEOUS EVERY 5 MIN PRN
Status: DISCONTINUED | OUTPATIENT
Start: 2019-06-19 | End: 2019-06-19

## 2019-06-19 RX ADMIN — DEXAMETHASONE SODIUM PHOSPHATE 4 MG: 4 MG/ML VIAL (ML) INJECTION at 09:54:00

## 2019-06-19 RX ADMIN — SODIUM CHLORIDE: 9 INJECTION, SOLUTION INTRAVENOUS at 09:24:00

## 2019-06-19 RX ADMIN — MIDAZOLAM HYDROCHLORIDE 2 MG: 1 INJECTION INTRAMUSCULAR; INTRAVENOUS at 09:26:00

## 2019-06-19 NOTE — ANESTHESIA PREPROCEDURE EVALUATION
Anesthesia PreOp Note    HPI:     Kerrin Cowden is a 77year old female who presents for preoperative consultation requested by:  Wojciech Gloria MD    Date of Surgery: 6/19/2019    Procedure(s):  CYSTOSCOPY  Indication: Overactive bladder [N32.81] Medications Prior to Admission:  CRANBERRY OR Take 1 tablet by mouth daily. Disp:  Rfl:  6/12/2019   CINNAMON OR Take 1 capsule by mouth daily. Disp:  Rfl:  6/12/2019   Nutritional Supplements (CHOLESTEROL DEFENSE OR) Take 1 tablet by mouth daily. 1 Device Rfl: 0 6/18/2019 at Unknown time   Glucose Blood (ACCU-CHEK TYRONE) In Vitro Strip Test Blood Sugar Once Daily. Non-Insulin Dependent Diabetes Type II. E11.9.  Disp: 100 strip Rfl: 3 6/18/2019 at Unknown time   ACCU-CHEK SOFTCLIX LANCETS Does not ap Socioeconomic History      Marital status: Single      Spouse name: Not on file      Number of children: Not on file      Years of education: Not on file      Highest education level: Not on file    Occupational History      Occupation: reitred     Social Asked    Social History Narrative      ** Merged History Encounter **             Available pre-op labs reviewed.   Lab Results   Component Value Date    WBC 11.61 04/12/2019    RBC 4.70 04/12/2019    HGB 13.8 04/12/2019    HCT 41.4 04/12/2019    MCV 88.1 0 answered to the best of my ability. The patient desires the anesthetic management as planned.   Lina Gaytan  6/19/2019 7:48 AM

## 2019-06-19 NOTE — OPERATIVE REPORT
[facility]P - [facility]    Operative Note     Francisca Quezadatz Location: OR   [facility] [id number] MRN [id number]   Admission Date 6/19/2019 Operation Date 6/19/2019   Attending Physician Tiffanie Sinclair MD Operating Physician MD Frida Toro

## 2019-06-19 NOTE — H&P
77year old female with baseline moderate to severe overactive bladder symptoms (voids every hour during the day, nocturia x 5-6, urgency and urge associated incontinence as well as stress urinary incontinence).   She states her mixed incontinence is 50/50 History   Problem Relation Age of Onset   • Cancer Mother           leukemia    • Other (CVD) Father     • Other (? health) Sister     • Other (migraines) Son     • Breast Cancer Paternal Aunt           age 62s   • Breast Cancer Paternal Aunt 48         Ag Rfl: 3   Glucose Blood (ACCU-CHEK TYRONE PLUS) In Vitro Strip Test Blood Sugar Once Daily. Non-Insulin Dependent Diabetes Type II. E11.9.  Disp: 100 strip Rfl: 3   Blood Glucose Monitoring Suppl (ACCU-CHEK TYRONE) Does not apply Device Test Blood Sugar Once D patient. He would like to consider those options and will call us back to schedule once she is ready. I also told the patient that I feel that her significant incontinence is likely a contributing factor to her recurrent urinary tract infections.   She wi

## 2019-06-19 NOTE — ANESTHESIA PROCEDURE NOTES
Airway  Date/Time: 6/19/2019 9:30 AM  Urgency: elective      General Information and Staff    Patient location during procedure: OR  Anesthesiologist: Cale Land MD  Resident/CRNA: Liseth Deal CRNA  Performed: CRNA     Indications and Patien

## 2019-06-19 NOTE — ANESTHESIA POSTPROCEDURE EVALUATION
Patient: Aden Austin    Procedure Summary     Date:  06/19/19 Room / Location:  [address] [place] [address] MAIN OR 14 / [address] [place] [address] MAIN OR    Anesthesia Start:  5611 Anesthesia Stop:      Procedure:  CYSTOSCOPY (N/A Bladder) Diagnosis:       Overactive bladder      (Overactive b

## 2019-06-19 NOTE — INTERVAL H&P NOTE
Pre-op Diagnosis: Overactive bladder [N32.81]    The above referenced H&P was reviewed by Suraj Martinez MD on 6/19/2019, the patient was examined and no significant changes have occurred in the patient's condition since the H&P was performed.   I discussed

## 2019-06-20 ENCOUNTER — TELEPHONE (OUTPATIENT)
Dept: SURGERY | Facility: CLINIC | Age: 69
End: 2019-06-20

## 2019-06-20 NOTE — TELEPHONE ENCOUNTER
Pt had sx 6/19 and states KHB would like to see her back in 3 weeks for a f/u. JULIA found no available appts and pt states she is leaving town 7/16.  pls advise thank you

## 2019-06-23 ENCOUNTER — TELEPHONE (OUTPATIENT)
Dept: SURGERY | Facility: CLINIC | Age: 69
End: 2019-06-23

## 2019-06-23 ENCOUNTER — APPOINTMENT (OUTPATIENT)
Dept: LAB | Facility: HOSPITAL | Age: 69
End: 2019-06-23
Attending: UROLOGY
Payer: MEDICARE

## 2019-06-23 DIAGNOSIS — N30.00 ACUTE CYSTITIS WITHOUT HEMATURIA: Primary | ICD-10-CM

## 2019-06-23 DIAGNOSIS — N30.00 ACUTE CYSTITIS WITHOUT HEMATURIA: ICD-10-CM

## 2019-06-23 PROCEDURE — 81001 URINALYSIS AUTO W/SCOPE: CPT

## 2019-06-23 PROCEDURE — 87086 URINE CULTURE/COLONY COUNT: CPT

## 2019-06-23 NOTE — TELEPHONE ENCOUNTER
COVERING DOCTOR ANDREZ    6/23/2019 Sunday afternoon patient pages me; I call her back; we discussed the following--6/19/2019 cystoscopy Botox injection by Dr. Matthew Varela; since 6/22/2019 Saturday evening including today, intermittent 3 to 4-hour episodes of u

## 2019-06-27 NOTE — TELEPHONE ENCOUNTER
Phoned pt back and spoke with her. Informed her urine c&s negative for infection, however Maricruz Ivan has not yet had opportunity to review and advise. She denies fever at this time and states feeling much better. (Maricruz Ivan, see 's message below) Offer

## 2019-06-27 NOTE — TELEPHONE ENCOUNTER
Pt calling back as instructed in regards to getting results and schedule post op appt. Pt states this is her second time calling.  pls call thank you

## 2019-07-05 ENCOUNTER — OFFICE VISIT (OUTPATIENT)
Dept: SURGERY | Facility: CLINIC | Age: 69
End: 2019-07-05
Payer: MEDICARE

## 2019-07-05 VITALS
HEART RATE: 84 BPM | HEIGHT: 61 IN | BODY MASS INDEX: 29.45 KG/M2 | DIASTOLIC BLOOD PRESSURE: 82 MMHG | TEMPERATURE: 98 F | SYSTOLIC BLOOD PRESSURE: 155 MMHG | WEIGHT: 156 LBS

## 2019-07-05 DIAGNOSIS — N32.81 OVERACTIVE BLADDER: Primary | ICD-10-CM

## 2019-07-05 PROCEDURE — G0463 HOSPITAL OUTPT CLINIC VISIT: HCPCS | Performed by: UROLOGY

## 2019-07-05 PROCEDURE — 99213 OFFICE O/P EST LOW 20 MIN: CPT | Performed by: UROLOGY

## 2019-07-08 NOTE — PROGRESS NOTES
Phyllis Jean Baptiste is a 68year old female.     HPI:   Patient presents with:  Post-Op: Patient is S/P Cystoscopy, Botox injection DOS 6/19/19      68year-old female status post cystoscopy and Botox injection of the bladder June 19, 2019 presents for posto of lipomas       Family History   Problem Relation Age of Onset   • Cancer Mother         leukemia    • Other (CVD) Father    • Other (? health) Sister    • Other (migraines) Son    • Breast Cancer Paternal Aunt         age 62s   • Breast Cancer Paternal A capsule Rfl: 3   DilTIAZem HCl  MG Oral Capsule SR 24 Hr TAKE ONE CAPSULE BY MOUTH ONCE DAILY IN THE MORNING Disp: 90 capsule Rfl: 3   estradiol 0.5 MG Oral Tab TAKE ONE TABLET BY MOUTH ONCE DAILY IN THE MORNING Disp: 90 tablet Rfl: 3   Glucose Blood Prescriptions      No prescriptions requested or ordered in this encounter       Imaging & Referrals:  None     7/8/2019  Sebas Hoffmann MD

## 2019-09-12 ENCOUNTER — APPOINTMENT (OUTPATIENT)
Dept: LAB | Facility: HOSPITAL | Age: 69
End: 2019-09-12
Attending: NURSE PRACTITIONER
Payer: MEDICARE

## 2019-09-12 DIAGNOSIS — R39.9 SYMPTOM INVOLVING BLADDER: ICD-10-CM

## 2019-09-12 DIAGNOSIS — N39.0 RECURRENT UTI: ICD-10-CM

## 2019-09-12 LAB
BILIRUB UR QL: NEGATIVE
CLARITY UR: CLEAR
COLOR UR: YELLOW
GLUCOSE UR-MCNC: 150 MG/DL
HGB UR QL STRIP.AUTO: NEGATIVE
KETONES UR-MCNC: NEGATIVE MG/DL
NITRITE UR QL STRIP.AUTO: NEGATIVE
PH UR: 5 [PH] (ref 5–8)
PROT UR-MCNC: NEGATIVE MG/DL
RBC #/AREA URNS AUTO: 4 /HPF
SP GR UR STRIP: 1.02 (ref 1–1.03)
UROBILINOGEN UR STRIP-ACNC: <2
VIT C UR-MCNC: 40 MG/DL
WBC #/AREA URNS AUTO: 36 /HPF

## 2019-09-12 PROCEDURE — 87086 URINE CULTURE/COLONY COUNT: CPT

## 2019-09-12 PROCEDURE — 81001 URINALYSIS AUTO W/SCOPE: CPT

## 2019-10-02 ENCOUNTER — TELEPHONE (OUTPATIENT)
Dept: SURGERY | Facility: CLINIC | Age: 69
End: 2019-10-02

## 2019-10-02 NOTE — TELEPHONE ENCOUNTER
Patient had UA and urine culture done on 9/12/19, urine culture shows < 10,000 cfu/ml gram negative rods, no indication for treatment. After reviewing previous UAs on patient she is noted to have microhematuria.   I attempted to call patient to see if she

## 2019-10-03 NOTE — TELEPHONE ENCOUNTER
Spoke with pt and gave the results and instructions in [facility] results msg below and pt verbalized understanding and will c/b in a few weeks to make an appt because she is going [facility].  Pt then stated that she thinks that it is ridiculous that she is hearing ba

## 2019-10-04 ENCOUNTER — TELEPHONE (OUTPATIENT)
Dept: SURGERY | Facility: CLINIC | Age: 69
End: 2019-10-04

## 2019-10-04 NOTE — TELEPHONE ENCOUNTER
Spoke with pt and discussed what tests may be needed for the microscopic blood seen in her urine and I explained that [facility] feels that she may need to have a work  Up and would need to see K[facility] and the work up usually includes a urine cytology, CT Urogram and

## 2019-10-21 ENCOUNTER — OFFICE VISIT (OUTPATIENT)
Dept: SURGERY | Facility: CLINIC | Age: 69
End: 2019-10-21
Payer: MEDICARE

## 2019-10-21 VITALS
DIASTOLIC BLOOD PRESSURE: 84 MMHG | WEIGHT: 156 LBS | BODY MASS INDEX: 29.45 KG/M2 | HEART RATE: 94 BPM | HEIGHT: 61 IN | SYSTOLIC BLOOD PRESSURE: 154 MMHG

## 2019-10-21 DIAGNOSIS — N32.81 OVERACTIVE BLADDER: ICD-10-CM

## 2019-10-21 DIAGNOSIS — R30.0 DYSURIA: Primary | ICD-10-CM

## 2019-10-21 PROCEDURE — 99214 OFFICE O/P EST MOD 30 MIN: CPT | Performed by: UROLOGY

## 2019-10-21 PROCEDURE — G0463 HOSPITAL OUTPT CLINIC VISIT: HCPCS | Performed by: UROLOGY

## 2019-10-21 NOTE — PROGRESS NOTES
Kerrin Cowden is a 68year old female. HPI:   Patient presents with:  Urinary Symptoms (urologic)      40-year-old female status post cystoscopy and Botox injection of the bladder 2/19/2019. I had last seen the patient in the office July 5, 2019. • COLONOSCOPY  2014 Dr. Tsering Amaya   • CYSTOSCOPY N/A 6/19/2019    Performed by Dustin Bah MD at [facility] OR   • HYSTERECTOMY     • OTHER  07/2017    EUS aspiration of pancreatic cyst per Dr. Andrew Cheney at [facility]  no cytology found   • OTHER SURGICAL HISTORY      e BY MOUTH 4 TIMES DAILY AS NEEDED FOR DIARRHEA, Disp: 30 tablet, Rfl: 5  LORazepam 0.5 MG Oral Tab, Take 1 tablet (0.5 mg total) by mouth nightly., Disp: 30 tablet, Rfl: 5  METFORMIN  MG Oral Tab, TAKE TWO TABLETS BY MOUTH TWICE DAILY WITH MEALS, Dis without evidence of pelvic organ prolapse. Palpation of the anterior vagina/urethra shows no palpable masses. Urethral meatus appears to be unremarkable.      ASSESSMENT/PLAN:   Assessment   Dysuria  (primary encounter diagnosis)  Overactive bladder    Re

## 2019-10-23 ENCOUNTER — APPOINTMENT (OUTPATIENT)
Dept: LAB | Facility: HOSPITAL | Age: 69
End: 2019-10-23
Attending: UROLOGY
Payer: MEDICARE

## 2019-10-23 DIAGNOSIS — R30.0 DYSURIA: ICD-10-CM

## 2019-10-23 PROCEDURE — 81001 URINALYSIS AUTO W/SCOPE: CPT

## 2019-10-23 PROCEDURE — 87088 URINE BACTERIA CULTURE: CPT

## 2019-10-23 PROCEDURE — 87186 SC STD MICRODIL/AGAR DIL: CPT

## 2019-10-23 PROCEDURE — 87086 URINE CULTURE/COLONY COUNT: CPT

## 2019-10-27 ENCOUNTER — APPOINTMENT (OUTPATIENT)
Dept: LAB | Facility: HOSPITAL | Age: 69
End: 2019-10-27
Attending: NURSE PRACTITIONER
Payer: MEDICARE

## 2019-10-27 DIAGNOSIS — N39.0 RECURRENT UTI: ICD-10-CM

## 2019-10-27 PROCEDURE — 87186 SC STD MICRODIL/AGAR DIL: CPT

## 2019-10-27 PROCEDURE — 87086 URINE CULTURE/COLONY COUNT: CPT

## 2019-10-27 PROCEDURE — 87077 CULTURE AEROBIC IDENTIFY: CPT

## 2019-10-27 PROCEDURE — 81001 URINALYSIS AUTO W/SCOPE: CPT

## 2019-10-28 ENCOUNTER — TELEPHONE (OUTPATIENT)
Dept: SURGERY | Facility: CLINIC | Age: 69
End: 2019-10-28

## 2019-10-28 RX ORDER — CEPHALEXIN 500 MG/1
500 CAPSULE ORAL 2 TIMES DAILY
Qty: 14 CAPSULE | Refills: 0 | Status: SHIPPED | OUTPATIENT
Start: 2019-10-28 | End: 2019-11-04

## 2019-10-28 NOTE — TELEPHONE ENCOUNTER
Phoned pt and spoke with her. She confirms she is symptomatic, c/o burning with urination and feeling fatigued. Denies fever. States she submitted another sample to lab for u/a and c&s, because she visualized blood in her urine. See u/a. C&s In process.  Sh

## 2019-10-28 NOTE — TELEPHONE ENCOUNTER
She should hold the maintenance dose of 250 mg PO and take treatment dose of 500 mg PO BID x 7 days.

## 2019-10-28 NOTE — TELEPHONE ENCOUNTER
----- Message from Earleen Mcburney, APRN sent at 10/25/2019  1:03 PM CDT -----  Urine culture positive for E. Coli. It is a small amount of bacteria. I would only treat this if symptomatic. Please call and see if she is having symptoms.   If no sympt

## 2019-10-28 NOTE — TELEPHONE ENCOUNTER
Thank you. Phoned pt and spoke with her. Read to her Apn's note as outlined below in this encounter, in it's entirety. She will also resume the maintenance dose upon completion. She also wants to make sure Rose Jacome, to discuss this with Trisha Rai.  Rose Jacome was deb

## 2019-10-28 NOTE — TELEPHONE ENCOUNTER
I will send to her pharmacy cephalexin 500 mg PO BID x 7 days.     Thank you,  Glory OVALLE  [facility]

## 2019-10-28 NOTE — TELEPHONE ENCOUNTER
So I will have her hold the maintenance cephalexin, or is she covered since she is taking the 250 mg dose ?

## 2019-10-29 PROBLEM — K52.9 CHRONIC DIARRHEA: Status: RESOLVED | Noted: 2017-10-04 | Resolved: 2019-10-29

## 2019-11-07 ENCOUNTER — HOSPITAL (OUTPATIENT)
Dept: OTHER | Age: 69
End: 2019-11-07
Attending: INTERNAL MEDICINE

## 2019-12-11 NOTE — TELEPHONE ENCOUNTER
Pt requesting refill for cephalexin, states she only has 2 pills left. [facility] pharmacy has sent fax twice to refill. Please advise thank you.

## 2019-12-12 NOTE — TELEPHONE ENCOUNTER
S/W pt and determined that she is almost out of the Cephalexin 250 mg suppressive TX and needs a refill sent immediately.  I explained that at her LOV on 10/21/19 CHRISTINE stated the plan below which she agreed to:    ASSESSMENT/PLAN:   Assessment   Dysuria  (pr

## 2019-12-13 RX ORDER — CEPHALEXIN 250 MG/1
250 CAPSULE ORAL DAILY
Qty: 90 CAPSULE | Refills: 3 | Status: SHIPPED | OUTPATIENT
Start: 2019-12-13 | End: 2020-05-06 | Stop reason: ALTCHOICE

## 2020-02-18 ENCOUNTER — LAB ENCOUNTER (OUTPATIENT)
Dept: LAB | Facility: HOSPITAL | Age: 70
End: 2020-02-18
Attending: PHYSICIAN ASSISTANT
Payer: MEDICARE

## 2020-02-18 DIAGNOSIS — Z01.818 PRE-OP TESTING: ICD-10-CM

## 2020-02-18 LAB
ANTIBODY SCREEN: NEGATIVE
RH BLOOD TYPE: POSITIVE

## 2020-02-18 PROCEDURE — 86850 RBC ANTIBODY SCREEN: CPT

## 2020-02-18 PROCEDURE — 36415 COLL VENOUS BLD VENIPUNCTURE: CPT

## 2020-02-18 PROCEDURE — 86900 BLOOD TYPING SEROLOGIC ABO: CPT

## 2020-02-18 PROCEDURE — 87086 URINE CULTURE/COLONY COUNT: CPT

## 2020-02-18 PROCEDURE — 87081 CULTURE SCREEN ONLY: CPT

## 2020-02-18 PROCEDURE — 86901 BLOOD TYPING SEROLOGIC RH(D): CPT

## 2020-03-05 NOTE — H&P
ORTHO SURGERY H&P  Cee Prado is a 68year old female. MRN is [id number]. CC: Left knee pain    HPI: Ms. Adrián Christensen is a 56-year-old female with a known history of left knee osteoarthritis who presents the office complaining of left knee pain.  She by mouth daily.  (Patient taking differently: Take 250 mg by mouth nightly.  ) 90 capsule 3   • topiramate 50 MG Oral Tab TAKE 1 & 1/2 (ONE & ONE-HALF) TABLETS BY MOUTH ONCE DAILY AT BEDTIME 135 tablet 1   • estradiol 0.5 MG Oral Tab TAKE ONE TABLET BY MOUT Wt 157 lb (71.2 kg)   BMI 30.66 kg/m²     Physical Exam:    Well-nourished female in no acute distress. She walks with an antalgic gait with a cane. On physical exam of the left knee, there is a 1+ effusion.  There is tenderness over the medial joint li

## 2020-03-09 ENCOUNTER — ANESTHESIA (OUTPATIENT)
Dept: SURGERY | Facility: HOSPITAL | Age: 70
DRG: 470 | End: 2020-03-09
Payer: MEDICARE

## 2020-03-09 ENCOUNTER — HOSPITAL ENCOUNTER (INPATIENT)
Facility: HOSPITAL | Age: 70
LOS: 3 days | Discharge: HOME HEALTH | DRG: 470 | End: 2020-03-12
Attending: ORTHOPAEDIC SURGERY | Admitting: ORTHOPAEDIC SURGERY
Payer: MEDICARE

## 2020-03-09 ENCOUNTER — ANESTHESIA EVENT (OUTPATIENT)
Dept: SURGERY | Facility: HOSPITAL | Age: 70
DRG: 470 | End: 2020-03-09
Payer: MEDICARE

## 2020-03-09 ENCOUNTER — APPOINTMENT (OUTPATIENT)
Dept: GENERAL RADIOLOGY | Facility: HOSPITAL | Age: 70
DRG: 470 | End: 2020-03-09
Attending: ORTHOPAEDIC SURGERY
Payer: MEDICARE

## 2020-03-09 PROBLEM — M17.12 PRIMARY LOCALIZED OSTEOARTHRITIS OF LEFT KNEE: Status: ACTIVE | Noted: 2020-03-09

## 2020-03-09 LAB
GLUCOSE BLDC GLUCOMTR-MCNC: 108 MG/DL (ref 70–99)
GLUCOSE BLDC GLUCOMTR-MCNC: 117 MG/DL (ref 70–99)
GLUCOSE BLDC GLUCOMTR-MCNC: 128 MG/DL (ref 70–99)
GLUCOSE BLDC GLUCOMTR-MCNC: 148 MG/DL (ref 70–99)
GLUCOSE BLDC GLUCOMTR-MCNC: 154 MG/DL (ref 70–99)

## 2020-03-09 PROCEDURE — 82962 GLUCOSE BLOOD TEST: CPT

## 2020-03-09 PROCEDURE — S0028 INJECTION, FAMOTIDINE, 20 MG: HCPCS | Performed by: ORTHOPAEDIC SURGERY

## 2020-03-09 PROCEDURE — 97110 THERAPEUTIC EXERCISES: CPT

## 2020-03-09 PROCEDURE — 88305 TISSUE EXAM BY PATHOLOGIST: CPT | Performed by: ORTHOPAEDIC SURGERY

## 2020-03-09 PROCEDURE — 97162 PT EVAL MOD COMPLEX 30 MIN: CPT

## 2020-03-09 PROCEDURE — 0SRD0L9 REPLACEMENT OF LEFT KNEE JOINT WITH MEDIAL UNICONDYLAR SYNTHETIC SUBSTITUTE, CEMENTED, OPEN APPROACH: ICD-10-PCS | Performed by: ORTHOPAEDIC SURGERY

## 2020-03-09 PROCEDURE — 88311 DECALCIFY TISSUE: CPT | Performed by: ORTHOPAEDIC SURGERY

## 2020-03-09 PROCEDURE — 73560 X-RAY EXAM OF KNEE 1 OR 2: CPT | Performed by: ORTHOPAEDIC SURGERY

## 2020-03-09 DEVICE — IMPLANTABLE DEVICE: Type: IMPLANTABLE DEVICE | Site: KNEE | Status: FUNCTIONAL

## 2020-03-09 DEVICE — REFOBACIN BC R 1X40 US: Type: IMPLANTABLE DEVICE | Site: KNEE | Status: FUNCTIONAL

## 2020-03-09 DEVICE — OXF CEM FEM/CEM TIB/STD AS: Type: IMPLANTABLE DEVICE | Status: FUNCTIONAL

## 2020-03-09 RX ORDER — MORPHINE SULFATE 4 MG/ML
4 INJECTION, SOLUTION INTRAMUSCULAR; INTRAVENOUS EVERY 10 MIN PRN
Status: DISCONTINUED | OUTPATIENT
Start: 2020-03-09 | End: 2020-03-09 | Stop reason: HOSPADM

## 2020-03-09 RX ORDER — DIPHENHYDRAMINE HYDROCHLORIDE 50 MG/ML
25 INJECTION INTRAMUSCULAR; INTRAVENOUS ONCE AS NEEDED
Status: ACTIVE | OUTPATIENT
Start: 2020-03-09 | End: 2020-03-09

## 2020-03-09 RX ORDER — BISACODYL 10 MG
10 SUPPOSITORY, RECTAL RECTAL
Status: DISCONTINUED | OUTPATIENT
Start: 2020-03-09 | End: 2020-03-12

## 2020-03-09 RX ORDER — METOCLOPRAMIDE 10 MG/1
10 TABLET ORAL ONCE
Status: COMPLETED | OUTPATIENT
Start: 2020-03-09 | End: 2020-03-09

## 2020-03-09 RX ORDER — SODIUM CHLORIDE, SODIUM LACTATE, POTASSIUM CHLORIDE, CALCIUM CHLORIDE 600; 310; 30; 20 MG/100ML; MG/100ML; MG/100ML; MG/100ML
INJECTION, SOLUTION INTRAVENOUS CONTINUOUS
Status: DISCONTINUED | OUTPATIENT
Start: 2020-03-09 | End: 2020-03-12

## 2020-03-09 RX ORDER — GABAPENTIN 100 MG/1
100 CAPSULE ORAL ONCE
Status: COMPLETED | OUTPATIENT
Start: 2020-03-09 | End: 2020-03-09

## 2020-03-09 RX ORDER — DEXTROSE MONOHYDRATE 25 G/50ML
50 INJECTION, SOLUTION INTRAVENOUS
Status: DISCONTINUED | OUTPATIENT
Start: 2020-03-09 | End: 2020-03-09 | Stop reason: HOSPADM

## 2020-03-09 RX ORDER — SODIUM PHOSPHATE, DIBASIC AND SODIUM PHOSPHATE, MONOBASIC 7; 19 G/133ML; G/133ML
1 ENEMA RECTAL ONCE AS NEEDED
Status: DISCONTINUED | OUTPATIENT
Start: 2020-03-09 | End: 2020-03-12

## 2020-03-09 RX ORDER — TOPIRAMATE 25 MG/1
75 TABLET ORAL EVERY EVENING
Status: DISCONTINUED | OUTPATIENT
Start: 2020-03-09 | End: 2020-03-12

## 2020-03-09 RX ORDER — HYDROCODONE BITARTRATE AND ACETAMINOPHEN 5; 325 MG/1; MG/1
1 TABLET ORAL AS NEEDED
Status: DISCONTINUED | OUTPATIENT
Start: 2020-03-09 | End: 2020-03-09 | Stop reason: HOSPADM

## 2020-03-09 RX ORDER — MORPHINE SULFATE 2 MG/ML
2 INJECTION, SOLUTION INTRAMUSCULAR; INTRAVENOUS EVERY 2 HOUR PRN
Status: DISCONTINUED | OUTPATIENT
Start: 2020-03-09 | End: 2020-03-12

## 2020-03-09 RX ORDER — PROCHLORPERAZINE EDISYLATE 5 MG/ML
5 INJECTION INTRAMUSCULAR; INTRAVENOUS ONCE AS NEEDED
Status: DISCONTINUED | OUTPATIENT
Start: 2020-03-09 | End: 2020-03-09 | Stop reason: HOSPADM

## 2020-03-09 RX ORDER — MORPHINE SULFATE 4 MG/ML
2 INJECTION, SOLUTION INTRAMUSCULAR; INTRAVENOUS EVERY 10 MIN PRN
Status: DISCONTINUED | OUTPATIENT
Start: 2020-03-09 | End: 2020-03-09 | Stop reason: HOSPADM

## 2020-03-09 RX ORDER — CEFAZOLIN SODIUM/WATER 2 G/20 ML
2 SYRINGE (ML) INTRAVENOUS ONCE
Status: COMPLETED | OUTPATIENT
Start: 2020-03-09 | End: 2020-03-09

## 2020-03-09 RX ORDER — SODIUM CHLORIDE, SODIUM LACTATE, POTASSIUM CHLORIDE, CALCIUM CHLORIDE 600; 310; 30; 20 MG/100ML; MG/100ML; MG/100ML; MG/100ML
INJECTION, SOLUTION INTRAVENOUS CONTINUOUS
Status: DISCONTINUED | OUTPATIENT
Start: 2020-03-09 | End: 2020-03-09 | Stop reason: HOSPADM

## 2020-03-09 RX ORDER — SODIUM CHLORIDE 9 MG/ML
INJECTION, SOLUTION INTRAVENOUS CONTINUOUS
Status: DISCONTINUED | OUTPATIENT
Start: 2020-03-09 | End: 2020-03-12

## 2020-03-09 RX ORDER — METOCLOPRAMIDE HYDROCHLORIDE 5 MG/ML
10 INJECTION INTRAMUSCULAR; INTRAVENOUS EVERY 6 HOURS PRN
Status: DISCONTINUED | OUTPATIENT
Start: 2020-03-09 | End: 2020-03-12

## 2020-03-09 RX ORDER — DEXTROSE MONOHYDRATE 25 G/50ML
50 INJECTION, SOLUTION INTRAVENOUS
Status: DISCONTINUED | OUTPATIENT
Start: 2020-03-09 | End: 2020-03-12

## 2020-03-09 RX ORDER — ASPIRIN 325 MG
325 TABLET ORAL 2 TIMES DAILY
Status: DISCONTINUED | OUTPATIENT
Start: 2020-03-09 | End: 2020-03-12

## 2020-03-09 RX ORDER — HALOPERIDOL 5 MG/ML
0.25 INJECTION INTRAMUSCULAR ONCE AS NEEDED
Status: DISCONTINUED | OUTPATIENT
Start: 2020-03-09 | End: 2020-03-09 | Stop reason: HOSPADM

## 2020-03-09 RX ORDER — NALOXONE HYDROCHLORIDE 0.4 MG/ML
80 INJECTION, SOLUTION INTRAMUSCULAR; INTRAVENOUS; SUBCUTANEOUS AS NEEDED
Status: DISCONTINUED | OUTPATIENT
Start: 2020-03-09 | End: 2020-03-09 | Stop reason: HOSPADM

## 2020-03-09 RX ORDER — GABAPENTIN 100 MG/1
200 CAPSULE ORAL NIGHTLY
Status: DISCONTINUED | OUTPATIENT
Start: 2020-03-09 | End: 2020-03-12

## 2020-03-09 RX ORDER — BUPIVACAINE HYDROCHLORIDE 7.5 MG/ML
INJECTION, SOLUTION INTRASPINAL
Status: COMPLETED | OUTPATIENT
Start: 2020-03-09 | End: 2020-03-09

## 2020-03-09 RX ORDER — FAMOTIDINE 20 MG/1
20 TABLET ORAL ONCE
Status: COMPLETED | OUTPATIENT
Start: 2020-03-09 | End: 2020-03-09

## 2020-03-09 RX ORDER — FAMOTIDINE 10 MG/ML
20 INJECTION, SOLUTION INTRAVENOUS 2 TIMES DAILY
Status: DISCONTINUED | OUTPATIENT
Start: 2020-03-09 | End: 2020-03-12

## 2020-03-09 RX ORDER — DOCUSATE SODIUM 100 MG/1
100 CAPSULE, LIQUID FILLED ORAL 2 TIMES DAILY
Status: DISCONTINUED | OUTPATIENT
Start: 2020-03-09 | End: 2020-03-12

## 2020-03-09 RX ORDER — HYDROCODONE BITARTRATE AND ACETAMINOPHEN 7.5; 325 MG/1; MG/1
1 TABLET ORAL EVERY 4 HOURS PRN
Status: DISCONTINUED | OUTPATIENT
Start: 2020-03-09 | End: 2020-03-10

## 2020-03-09 RX ORDER — FAMOTIDINE 20 MG/1
20 TABLET ORAL 2 TIMES DAILY
Status: DISCONTINUED | OUTPATIENT
Start: 2020-03-09 | End: 2020-03-12

## 2020-03-09 RX ORDER — CEFAZOLIN SODIUM/WATER 2 G/20 ML
2 SYRINGE (ML) INTRAVENOUS EVERY 8 HOURS
Status: COMPLETED | OUTPATIENT
Start: 2020-03-09 | End: 2020-03-10

## 2020-03-09 RX ORDER — MIDAZOLAM HYDROCHLORIDE 1 MG/ML
INJECTION INTRAMUSCULAR; INTRAVENOUS AS NEEDED
Status: DISCONTINUED | OUTPATIENT
Start: 2020-03-09 | End: 2020-03-09 | Stop reason: SURG

## 2020-03-09 RX ORDER — MORPHINE SULFATE 4 MG/ML
4 INJECTION, SOLUTION INTRAMUSCULAR; INTRAVENOUS EVERY 2 HOUR PRN
Status: DISCONTINUED | OUTPATIENT
Start: 2020-03-09 | End: 2020-03-12

## 2020-03-09 RX ORDER — DEXTROSE MONOHYDRATE 25 G/50ML
50 INJECTION, SOLUTION INTRAVENOUS
Status: DISCONTINUED | OUTPATIENT
Start: 2020-03-09 | End: 2020-03-09

## 2020-03-09 RX ORDER — ONDANSETRON 2 MG/ML
4 INJECTION INTRAMUSCULAR; INTRAVENOUS EVERY 4 HOURS PRN
Status: DISCONTINUED | OUTPATIENT
Start: 2020-03-09 | End: 2020-03-12

## 2020-03-09 RX ORDER — HYDROMORPHONE HYDROCHLORIDE 1 MG/ML
0.2 INJECTION, SOLUTION INTRAMUSCULAR; INTRAVENOUS; SUBCUTANEOUS EVERY 5 MIN PRN
Status: DISCONTINUED | OUTPATIENT
Start: 2020-03-09 | End: 2020-03-09 | Stop reason: HOSPADM

## 2020-03-09 RX ORDER — ASPIRIN 325 MG
325 TABLET, DELAYED RELEASE (ENTERIC COATED) ORAL ONCE
Status: DISCONTINUED | OUTPATIENT
Start: 2020-03-09 | End: 2020-03-09 | Stop reason: HOSPADM

## 2020-03-09 RX ORDER — TOPIRAMATE 25 MG/1
50 TABLET ORAL EVERY EVENING
Status: DISCONTINUED | OUTPATIENT
Start: 2020-03-09 | End: 2020-03-09

## 2020-03-09 RX ORDER — HYDROMORPHONE HYDROCHLORIDE 1 MG/ML
0.4 INJECTION, SOLUTION INTRAMUSCULAR; INTRAVENOUS; SUBCUTANEOUS EVERY 5 MIN PRN
Status: DISCONTINUED | OUTPATIENT
Start: 2020-03-09 | End: 2020-03-09 | Stop reason: HOSPADM

## 2020-03-09 RX ORDER — POLYETHYLENE GLYCOL 3350 17 G/17G
17 POWDER, FOR SOLUTION ORAL DAILY PRN
Status: DISCONTINUED | OUTPATIENT
Start: 2020-03-09 | End: 2020-03-12

## 2020-03-09 RX ORDER — DEXTROSE, SODIUM CHLORIDE, AND POTASSIUM CHLORIDE 5; .45; .15 G/100ML; G/100ML; G/100ML
INJECTION INTRAVENOUS CONTINUOUS
Status: DISCONTINUED | OUTPATIENT
Start: 2020-03-09 | End: 2020-03-09

## 2020-03-09 RX ORDER — HYDROCODONE BITARTRATE AND ACETAMINOPHEN 5; 325 MG/1; MG/1
2 TABLET ORAL AS NEEDED
Status: DISCONTINUED | OUTPATIENT
Start: 2020-03-09 | End: 2020-03-09 | Stop reason: HOSPADM

## 2020-03-09 RX ORDER — HYDROMORPHONE HYDROCHLORIDE 1 MG/ML
0.6 INJECTION, SOLUTION INTRAMUSCULAR; INTRAVENOUS; SUBCUTANEOUS EVERY 5 MIN PRN
Status: DISCONTINUED | OUTPATIENT
Start: 2020-03-09 | End: 2020-03-09 | Stop reason: HOSPADM

## 2020-03-09 RX ORDER — SODIUM CHLORIDE 0.9 % (FLUSH) 0.9 %
10 SYRINGE (ML) INJECTION AS NEEDED
Status: DISCONTINUED | OUTPATIENT
Start: 2020-03-09 | End: 2020-03-12

## 2020-03-09 RX ORDER — ACETAMINOPHEN 500 MG
1000 TABLET ORAL ONCE
Status: COMPLETED | OUTPATIENT
Start: 2020-03-09 | End: 2020-03-09

## 2020-03-09 RX ORDER — SENNOSIDES 8.6 MG
17.2 TABLET ORAL NIGHTLY
Status: DISCONTINUED | OUTPATIENT
Start: 2020-03-09 | End: 2020-03-12

## 2020-03-09 RX ORDER — DIPHENHYDRAMINE HYDROCHLORIDE 50 MG/ML
12.5 INJECTION INTRAMUSCULAR; INTRAVENOUS EVERY 4 HOURS PRN
Status: DISCONTINUED | OUTPATIENT
Start: 2020-03-09 | End: 2020-03-12

## 2020-03-09 RX ORDER — MORPHINE SULFATE 10 MG/ML
6 INJECTION, SOLUTION INTRAMUSCULAR; INTRAVENOUS EVERY 10 MIN PRN
Status: DISCONTINUED | OUTPATIENT
Start: 2020-03-09 | End: 2020-03-09 | Stop reason: HOSPADM

## 2020-03-09 RX ORDER — ONDANSETRON 2 MG/ML
4 INJECTION INTRAMUSCULAR; INTRAVENOUS ONCE AS NEEDED
Status: DISCONTINUED | OUTPATIENT
Start: 2020-03-09 | End: 2020-03-09 | Stop reason: HOSPADM

## 2020-03-09 RX ORDER — TRANEXAMIC ACID 10 MG/ML
1000 INJECTION, SOLUTION INTRAVENOUS ONCE
Status: COMPLETED | OUTPATIENT
Start: 2020-03-09 | End: 2020-03-09

## 2020-03-09 RX ORDER — OXYCODONE HCL 10 MG/1
10 TABLET, FILM COATED, EXTENDED RELEASE ORAL EVERY 12 HOURS
Status: DISCONTINUED | OUTPATIENT
Start: 2020-03-09 | End: 2020-03-10

## 2020-03-09 RX ORDER — DIPHENHYDRAMINE HCL 25 MG
25 CAPSULE ORAL EVERY 4 HOURS PRN
Status: DISCONTINUED | OUTPATIENT
Start: 2020-03-09 | End: 2020-03-12

## 2020-03-09 RX ORDER — MORPHINE SULFATE 1 MG/ML
INJECTION, SOLUTION EPIDURAL; INTRATHECAL; INTRAVENOUS
Status: COMPLETED | OUTPATIENT
Start: 2020-03-09 | End: 2020-03-09

## 2020-03-09 RX ORDER — OXYCODONE HCL 10 MG/1
10 TABLET, FILM COATED, EXTENDED RELEASE ORAL ONCE
Status: COMPLETED | OUTPATIENT
Start: 2020-03-09 | End: 2020-03-09

## 2020-03-09 RX ORDER — MORPHINE SULFATE 2 MG/ML
1 INJECTION, SOLUTION INTRAMUSCULAR; INTRAVENOUS EVERY 2 HOUR PRN
Status: DISCONTINUED | OUTPATIENT
Start: 2020-03-09 | End: 2020-03-12

## 2020-03-09 RX ORDER — DILTIAZEM HYDROCHLORIDE 120 MG/1
240 CAPSULE, EXTENDED RELEASE ORAL DAILY
Status: DISCONTINUED | OUTPATIENT
Start: 2020-03-10 | End: 2020-03-12

## 2020-03-09 RX ADMIN — CEFAZOLIN SODIUM/WATER 2 G: 2 G/20 ML SYRINGE (ML) INTRAVENOUS at 10:25:00

## 2020-03-09 RX ADMIN — MIDAZOLAM HYDROCHLORIDE 1 MG: 1 INJECTION INTRAMUSCULAR; INTRAVENOUS at 10:10:00

## 2020-03-09 RX ADMIN — SODIUM CHLORIDE, SODIUM LACTATE, POTASSIUM CHLORIDE, CALCIUM CHLORIDE: 600; 310; 30; 20 INJECTION, SOLUTION INTRAVENOUS at 10:05:00

## 2020-03-09 RX ADMIN — MORPHINE SULFATE 0.3 MG: 1 INJECTION, SOLUTION EPIDURAL; INTRATHECAL; INTRAVENOUS at 10:14:00

## 2020-03-09 RX ADMIN — TRANEXAMIC ACID 1000 MG: 10 INJECTION, SOLUTION INTRAVENOUS at 10:25:00

## 2020-03-09 RX ADMIN — BUPIVACAINE HYDROCHLORIDE 1.5 ML: 7.5 INJECTION, SOLUTION INTRASPINAL at 10:14:00

## 2020-03-09 RX ADMIN — SODIUM CHLORIDE, SODIUM LACTATE, POTASSIUM CHLORIDE, CALCIUM CHLORIDE: 600; 310; 30; 20 INJECTION, SOLUTION INTRAVENOUS at 11:48:00

## 2020-03-09 RX ADMIN — MIDAZOLAM HYDROCHLORIDE 1 MG: 1 INJECTION INTRAMUSCULAR; INTRAVENOUS at 10:07:00

## 2020-03-09 NOTE — ANESTHESIA PREPROCEDURE EVALUATION
Anesthesia PreOp Note    HPI:     Yenny Gaines is a 68year old female who presents for preoperative consultation requested by: Danny Madrid MD    Date of Surgery: 3/9/2020    Procedure(s):  KNEE TOTAL REPLACEMENT  Indication: left knee osteoar 6/19/2019    Performed by Carloz Rosales MD at [address] [place] [address] MAIN OR   • HYSTERECTOMY     • OTHER SURGICAL HISTORY      excision of lipomas        aspirin 81 MG Oral Tab, Take 81 mg by mouth daily. , Disp: , Rfl: , 2/21/2020  Phenazopyridine HCl (AZO TABS OR), Take In Vitro Strip, Test Blood Sugar Once Daily. Non-Insulin Dependent Diabetes Type II. E11.9., Disp: 100 strip, Rfl: 3, Taking  Blood Glucose Monitoring Suppl (ACCU-CHEK TYRONE) Does not apply Device, Test Blood Sugar Once Daily.  Non-Insulin Dependent Diabete Former Smoker        Packs/day: 0.33        Years: 30.00        Pack years: 9.9        Quit date: 1990        Years since quittin.1      Smokeless tobacco: Never Used    Substance and Sexual Activity      Alcohol use: No        Alcohol/week: 0.0 02/11/2020    CREATSERUM 0.55 02/11/2020     (H) 02/11/2020    CA 9.9 02/11/2020          Vital Signs: Body mass index is 31.25 kg/m². height is 1.524 m (5') and weight is 72.6 kg (160 lb). Her oral temperature is 99 °F (37.2 °C).  Her blood press

## 2020-03-09 NOTE — OPERATIVE REPORT
Operative Note    Patient Name: Alejandro Vega    Preoperative Diagnosis: left knee osteoarthritis primary    Postoperative Diagnosis: left knee osteoarthritis primary    Primary Surgeon: Marcos Young MD     Assistant: Bobo Whitfield [facility]    Procedure

## 2020-03-09 NOTE — OPERATIVE REPORT
[facility]    PATIENT'S NAME: KELLIE MUSE   ATTENDING PHYSICIAN: Bulmaro Mena MD   OPERATING PHYSICIAN: Bulmaro Mena MD   PATIENT ACCOUNT#:   [id number]    LOCATION:  [facility] PACU 1 [facility] 10  MEDICAL RECORD #:   [id number] the right lower extremity. All bony prominences were well padded. The patient was given preoperative IV antibiotics and IV tranexamic acid. Next, the left knee was prepped and draped in a sterile fashion.   The extremity was exsanguinated, and the tourni We then placed the size 0 spigot and the distal reamer was performed. We placed the trial implants and the flexion gap measured for extension gap 3. Therefore, we re-reamed the distal femur with a size 1 spigot.   We then placed trial implants and the fle 12:19:50  [facility] [id number]  DLO/

## 2020-03-09 NOTE — CONSULTS
General Medicine Consult      Consulted by: Mila Guillermo MD    PCP: Elizabeth Graves MD    Reason for Consultation: Medical Management      ASSESSMENT / PLAN:    68year old female with PMH including but not limited to HTN, IBS, DM who p/t conditions. No tob/etoh. Uses metformin for dm.      PMH:  Past Medical History:   Diagnosis Date   • Cataract    • Chronic diarrhea 10/4/2017   • Diabetic retinopathy of left eye ([facility] [address] 10/2018   • High blood pressure    • IBS (irritable bowel syndrome) Father    • Other (? health) Sister    • Other (migraines) Son    • Breast Cancer Paternal Aunt 61        age at dx 61   • Breast Cancer Paternal Aunt 48        Age of dx 48   • Breast Cancer Paternal Aunt 61        Age of dx 61   • Breast Cancer Paternal knee arthroplasty. TECHNIQUE: 2 views were obtained. FINDINGS:  BONES: Unicompartmental/medial compartment arthroplasty has been performed. Hardware is in gross anatomic alignment.  Visualized femur, tibia, patella and fibula are in anatomic alignment wi

## 2020-03-09 NOTE — CM/SW NOTE
CM received MDO for fresh post op and s/p joint. CM met with pt to complete an initial assessment. CM confirmed pt's address and phone number with the pt. [place]  There is/are 0 steps into the home and 0 steps to the bedroo

## 2020-03-09 NOTE — PLAN OF CARE
Chung Mis is pod 0 left knee unicompartmental arthroplasty. Decreased sensation to b/l le s/p duramorph at 1013.   Ac/hs glucose monitoring-sliding scale as indicated-hg A1C level in am, diabetic education packet provided to patient-episode of emesis-zofran range  Description  INTERVENTIONS:  - Monitor Blood Glucose as ordered  - Assess for signs and symptoms of hyperglycemia and hypoglycemia  - Administer ordered medications to maintain glucose within target range  - Assess barriers to adequate nutritional i strengthening/mobility  - Encourage toileting schedule  Outcome: Progressing     Problem: DISCHARGE PLANNING  Goal: Discharge to home or other facility with appropriate resources  Description  INTERVENTIONS:  - Identify barriers to discharge w/pt and careg indicated  Outcome: Progressing

## 2020-03-09 NOTE — PHYSICAL THERAPY NOTE
PHYSICAL THERAPY KNEE EVALUATION - INPATIENT       Room Number: 431/431-A  Evaluation Date: 3/9/2020  Type of Evaluation: Initial  Physician Order: PT Eval and Treat    Presenting Problem: L knee unicompartmental arthroplasty  Reason for Therapy: Mobility female with a known history of left knee osteoarthritis who presents the office complaining of left knee pain. She had a cortisone injection in the left knee more than 3 months ago which helped for only 2 days.  She is interested in proceeding with surgical RESTRICTION  Weight Bearing Restriction: L lower extremity           L Lower Extremity: Weight Bearing as Tolerated    PAIN ASSESSMENT             COGNITION  · Overall Cognitive Status:  WFL - within functional limits      BALANCE  Static Sitting: Fair + Goal #1 Patient is able to demonstrate supine - sit EOB @ level: modified independent     Goal #1   Current Status    Goal #2 Patient is able to demonstrate transfers Sit to/from Stand at assistance level: modified independent     Goal #2  Current Status

## 2020-03-09 NOTE — ANESTHESIA POSTPROCEDURE EVALUATION
Patient: Yenny Gaines    Procedure Summary     Date:  03/09/20 Room / Location:  [address] [place] [address] MAIN OR 11 / [address] [place] [address] MAIN OR    Anesthesia Start:  1306 Anesthesia Stop:  1654    Procedure:  KNEE TOTAL REPLACEMENT (Left Knee) Diagnosis:  (left knee osteoarthritis pr

## 2020-03-09 NOTE — ANESTHESIA PROCEDURE NOTES
Spinal Block  Date/Time: 3/9/2020 10:14 AM  Performed by: Mirella Varela CRNA  Authorized by: Stephanie Valdovinos MD       General Information and Staff    Start Time:  3/9/2020 10:08 AM  End Time:  3/9/2020 10:13 AM  Anesthesiologist:  Stephanie Valdovinos MD  CRNA:

## 2020-03-09 NOTE — HOME CARE LIAISON
Received referral from [address] [place] [address]. Met with patient at the bedside. Patient is agreeable to [facility], pending orders. Residential brochure provided with contact information. All questions addressed and answered.      Patient will need

## 2020-03-10 LAB
ANION GAP SERPL CALC-SCNC: 10 MMOL/L (ref 0–18)
BUN BLD-MCNC: 12 MG/DL (ref 7–18)
BUN/CREAT SERPL: 16.2 (ref 10–20)
CALCIUM BLD-MCNC: 8.8 MG/DL (ref 8.5–10.1)
CHLORIDE SERPL-SCNC: 106 MMOL/L (ref 98–112)
CO2 SERPL-SCNC: 22 MMOL/L (ref 21–32)
CREAT BLD-MCNC: 0.74 MG/DL (ref 0.55–1.02)
DEPRECATED RDW RBC AUTO: 41.6 FL (ref 35.1–46.3)
ERYTHROCYTE [DISTWIDTH] IN BLOOD BY AUTOMATED COUNT: 12.7 % (ref 11–15)
EST. AVERAGE GLUCOSE BLD GHB EST-MCNC: 134 MG/DL (ref 68–126)
GLUCOSE BLD-MCNC: 182 MG/DL (ref 70–99)
GLUCOSE BLDC GLUCOMTR-MCNC: 174 MG/DL (ref 70–99)
GLUCOSE BLDC GLUCOMTR-MCNC: 230 MG/DL (ref 70–99)
GLUCOSE BLDC GLUCOMTR-MCNC: 237 MG/DL (ref 70–99)
GLUCOSE BLDC GLUCOMTR-MCNC: 257 MG/DL (ref 70–99)
HBA1C MFR BLD HPLC: 6.3 % (ref ?–5.7)
HCT VFR BLD AUTO: 35.5 % (ref 35–48)
HGB BLD-MCNC: 11.9 G/DL (ref 12–16)
MCH RBC QN AUTO: 30 PG (ref 26–34)
MCHC RBC AUTO-ENTMCNC: 33.5 G/DL (ref 31–37)
MCV RBC AUTO: 89.4 FL (ref 80–100)
OSMOLALITY SERPL CALC.SUM OF ELEC: 290 MOSM/KG (ref 275–295)
PLATELET # BLD AUTO: 220 10(3)UL (ref 150–450)
POTASSIUM SERPL-SCNC: 3.7 MMOL/L (ref 3.5–5.1)
RBC # BLD AUTO: 3.97 X10(6)UL (ref 3.8–5.3)
SODIUM SERPL-SCNC: 138 MMOL/L (ref 136–145)
WBC # BLD AUTO: 9.8 X10(3) UL (ref 4–11)

## 2020-03-10 PROCEDURE — 97530 THERAPEUTIC ACTIVITIES: CPT

## 2020-03-10 PROCEDURE — 85027 COMPLETE CBC AUTOMATED: CPT | Performed by: ORTHOPAEDIC SURGERY

## 2020-03-10 PROCEDURE — 97110 THERAPEUTIC EXERCISES: CPT

## 2020-03-10 PROCEDURE — 97116 GAIT TRAINING THERAPY: CPT

## 2020-03-10 PROCEDURE — 80048 BASIC METABOLIC PNL TOTAL CA: CPT | Performed by: ORTHOPAEDIC SURGERY

## 2020-03-10 PROCEDURE — 83036 HEMOGLOBIN GLYCOSYLATED A1C: CPT | Performed by: ORTHOPAEDIC SURGERY

## 2020-03-10 PROCEDURE — 97166 OT EVAL MOD COMPLEX 45 MIN: CPT

## 2020-03-10 PROCEDURE — 82962 GLUCOSE BLOOD TEST: CPT

## 2020-03-10 RX ORDER — HYDROCODONE BITARTRATE AND ACETAMINOPHEN 5; 325 MG/1; MG/1
1 TABLET ORAL EVERY 4 HOURS PRN
Status: DISCONTINUED | OUTPATIENT
Start: 2020-03-10 | End: 2020-03-10

## 2020-03-10 RX ORDER — HYDROCODONE BITARTRATE AND ACETAMINOPHEN 7.5; 325 MG/1; MG/1
1 TABLET ORAL EVERY 4 HOURS PRN
Status: DISCONTINUED | OUTPATIENT
Start: 2020-03-10 | End: 2020-03-12

## 2020-03-10 RX ORDER — KETOROLAC TROMETHAMINE 15 MG/ML
15 INJECTION, SOLUTION INTRAMUSCULAR; INTRAVENOUS ONCE
Status: COMPLETED | OUTPATIENT
Start: 2020-03-10 | End: 2020-03-10

## 2020-03-10 RX ORDER — POTASSIUM CHLORIDE 20 MEQ/1
40 TABLET, EXTENDED RELEASE ORAL ONCE
Status: COMPLETED | OUTPATIENT
Start: 2020-03-10 | End: 2020-03-10

## 2020-03-10 RX ORDER — KETOROLAC TROMETHAMINE 30 MG/ML
30 INJECTION, SOLUTION INTRAMUSCULAR; INTRAVENOUS ONCE
Status: DISCONTINUED | OUTPATIENT
Start: 2020-03-10 | End: 2020-03-10

## 2020-03-10 RX ORDER — DILTIAZEM HYDROCHLORIDE 120 MG/1
120 CAPSULE, EXTENDED RELEASE ORAL DAILY
Status: DISCONTINUED | OUTPATIENT
Start: 2020-03-10 | End: 2020-03-12

## 2020-03-10 RX ORDER — LOSARTAN POTASSIUM 100 MG/1
100 TABLET ORAL DAILY
Status: DISCONTINUED | OUTPATIENT
Start: 2020-03-10 | End: 2020-03-12

## 2020-03-10 RX ORDER — HYDRALAZINE HYDROCHLORIDE 20 MG/ML
10 INJECTION INTRAMUSCULAR; INTRAVENOUS EVERY 6 HOURS PRN
Status: DISCONTINUED | OUTPATIENT
Start: 2020-03-10 | End: 2020-03-12

## 2020-03-10 NOTE — PROGRESS NOTES
[facility] Hospitalist Progress Note     PCP: Gigi Donahue MD    Chief Complaint: follow-up    Overnight/Interim Events:      SUBJECTIVE:  Pt reports lot of pain. +nausea. Unable to stand c PT. Using IS. No flatus but had IBS episode p/t admit.  D/ Insulin Aspart Pen  1-5 Units Subcutaneous TID CC   • dilTIAZem HCl ER  240 mg Oral Daily   • topiramate  75 mg Oral QPM     • lactated ringers 20 mL/hr at 03/09/20 0928   • sodium chloride 125 mL/hr at 03/09/20 1450     HYDROcodone-acetaminophen, Normal S

## 2020-03-10 NOTE — OCCUPATIONAL THERAPY NOTE
OCCUPATIONAL THERAPY EVALUATION - INPATIENT      Room Number: 431/431-A  Evaluation Date: 3/10/2020  Type of Evaluation: Initial  Presenting Problem: L TKA    Physician Order: IP Consult to Occupational Therapy  Reason for Therapy: ADL/IADL Dysfunction and was not get back off the chair and wanted to remain in standing to work through the pain. Pt did not ambulate back to the bedside chair until dizziness and nausea passed.      Pt with very slow ambulation to back to the bedside chair due to pain ([facility] 10/2018   • High blood pressure    • IBS (irritable bowel syndrome)    • Incontinence     urine   • Internal hemorrhoids    • Migraines    • Osteoarthritis    • Other and unspecified hyperlipidemia    • Pancreas cyst 10/4/2017   • Type II or to place and situation this morning but not time-- pt believed her surgery was Saturday. Cognition improved in the afternoon- pt laughing at the fact that she thought it was Saturday earlier-  Pt at times with slow processing with ADL tasks.      RANGE OF addressed; Family present    OT Goals  Patient self-stated goal is: home      Patient will complete LE dressing with supervsion  Comment:     Patient will complete toilet transfer with supervison  Comment:     Patient will complete [facility] in standing at the si

## 2020-03-10 NOTE — PLAN OF CARE
Patient confused/disoriented to time this AM. Surgeon aware, pain medicines changed. Now [address] for PRN pain control. Polar Care for additional relief available.  Voiding freely following catheter removal this AM. Diabetic accucheck ACHS with sliding scale hypoglycemia  - Administer ordered medications to maintain glucose within target range  - Assess barriers to adequate nutritional intake and initiate nutrition consult as needed  - Instruct patient on self management of diabetes  Outcome: Progressing     P other facility with appropriate resources  Description  INTERVENTIONS:  - Identify barriers to discharge w/pt and caregiver  - Include patient/family/discharge partner in discharge planning  - Arrange for needed discharge resources and transportation as ap

## 2020-03-10 NOTE — PROGRESS NOTES
Subjective: No complaints. Pain controlled. Somewhat confused.     Objective:    Patient Vitals for the past 24 hrs:   BP Temp Temp src Pulse Resp SpO2 Height Weight   03/10/20 0848 (!) 180/64 99.7 °F (37.6 °C) Oral 82 20 — — —   03/10/20 0728 — 99.9 °F (3

## 2020-03-10 NOTE — PHYSICAL THERAPY NOTE
PHYSICAL THERAPY KNEE TREATMENT NOTE - INPATIENT     Room Number: 431/431-A             Presenting Problem: L knee unicompartmental arthroplasty    Problem List  Active Problems:    Primary localized osteoarthritis of left knee      PHYSICAL THERAPY ASSESS A Little   -   Climbing 3-5 steps with a railing?: A Little     AM-PAC Score:  Raw Score: 18   Approx Degree of Impairment: 46.58%   Standardized Score (AM-PAC Scale): 43.63   CMS Modifier (G-Code): CK    FUNCTIONAL ABILITY STATUS  Gait Assessment   Gait A

## 2020-03-10 NOTE — CM/SW NOTE
ADRIANA received [facility] orders, F2F completed. ADRIANA notified [facility]  of [facility] Orders and F2F via speaking with [facility] Rep. ADRIANA updated [facility]  on discharge date.      PLAN: DC home with [facility]  pending medical clearance     DAV Ramos

## 2020-03-11 LAB
ANION GAP SERPL CALC-SCNC: 6 MMOL/L (ref 0–18)
BUN BLD-MCNC: 8 MG/DL (ref 7–18)
BUN/CREAT SERPL: 12.7 (ref 10–20)
CALCIUM BLD-MCNC: 9.5 MG/DL (ref 8.5–10.1)
CHLORIDE SERPL-SCNC: 107 MMOL/L (ref 98–112)
CO2 SERPL-SCNC: 25 MMOL/L (ref 21–32)
CREAT BLD-MCNC: 0.63 MG/DL (ref 0.55–1.02)
DEPRECATED RDW RBC AUTO: 41 FL (ref 35.1–46.3)
ERYTHROCYTE [DISTWIDTH] IN BLOOD BY AUTOMATED COUNT: 12.6 % (ref 11–15)
GLUCOSE BLD-MCNC: 181 MG/DL (ref 70–99)
GLUCOSE BLDC GLUCOMTR-MCNC: 180 MG/DL (ref 70–99)
GLUCOSE BLDC GLUCOMTR-MCNC: 194 MG/DL (ref 70–99)
GLUCOSE BLDC GLUCOMTR-MCNC: 223 MG/DL (ref 70–99)
GLUCOSE BLDC GLUCOMTR-MCNC: 246 MG/DL (ref 70–99)
HAV IGM SER QL: 2 MG/DL (ref 1.6–2.6)
HCT VFR BLD AUTO: 37.7 % (ref 35–48)
HGB BLD-MCNC: 12.6 G/DL (ref 12–16)
MCH RBC QN AUTO: 29.6 PG (ref 26–34)
MCHC RBC AUTO-ENTMCNC: 33.4 G/DL (ref 31–37)
MCV RBC AUTO: 88.7 FL (ref 80–100)
OSMOLALITY SERPL CALC.SUM OF ELEC: 289 MOSM/KG (ref 275–295)
PLATELET # BLD AUTO: 227 10(3)UL (ref 150–450)
POTASSIUM SERPL-SCNC: 4.3 MMOL/L (ref 3.5–5.1)
RBC # BLD AUTO: 4.25 X10(6)UL (ref 3.8–5.3)
SODIUM SERPL-SCNC: 138 MMOL/L (ref 136–145)
WBC # BLD AUTO: 10.1 X10(3) UL (ref 4–11)

## 2020-03-11 PROCEDURE — 97116 GAIT TRAINING THERAPY: CPT

## 2020-03-11 PROCEDURE — 85027 COMPLETE CBC AUTOMATED: CPT | Performed by: ORTHOPAEDIC SURGERY

## 2020-03-11 PROCEDURE — 97530 THERAPEUTIC ACTIVITIES: CPT

## 2020-03-11 PROCEDURE — 83735 ASSAY OF MAGNESIUM: CPT | Performed by: INTERNAL MEDICINE

## 2020-03-11 PROCEDURE — 97110 THERAPEUTIC EXERCISES: CPT

## 2020-03-11 PROCEDURE — 82962 GLUCOSE BLOOD TEST: CPT

## 2020-03-11 PROCEDURE — 80048 BASIC METABOLIC PNL TOTAL CA: CPT | Performed by: INTERNAL MEDICINE

## 2020-03-11 RX ORDER — ASPIRIN 325 MG
325 TABLET ORAL 2 TIMES DAILY
Qty: 24 TABLET | Refills: 0 | Status: SHIPPED | OUTPATIENT
Start: 2020-03-11 | End: 2020-03-23

## 2020-03-11 RX ORDER — PSEUDOEPHEDRINE HCL 30 MG
100 TABLET ORAL 2 TIMES DAILY PRN
Qty: 30 CAPSULE | Refills: 0 | Status: SHIPPED | OUTPATIENT
Start: 2020-03-11 | End: 2020-05-06

## 2020-03-11 RX ORDER — HYDROCODONE BITARTRATE AND ACETAMINOPHEN 7.5; 325 MG/1; MG/1
1 TABLET ORAL EVERY 4 HOURS PRN
Qty: 40 TABLET | Refills: 0 | Status: SHIPPED | OUTPATIENT
Start: 2020-03-11 | End: 2020-05-06

## 2020-03-11 RX ORDER — CEPHALEXIN 250 MG/1
250 CAPSULE ORAL NIGHTLY
Status: DISCONTINUED | OUTPATIENT
Start: 2020-03-11 | End: 2020-03-12

## 2020-03-11 RX ORDER — GABAPENTIN 100 MG/1
200 CAPSULE ORAL NIGHTLY
Qty: 10 CAPSULE | Refills: 0 | Status: SHIPPED | OUTPATIENT
Start: 2020-03-11 | End: 2020-03-16

## 2020-03-11 NOTE — PLAN OF CARE
No acute events overnight. Pt pain poorly controlled at start of shift causing pt to be reluctant to ambulate. Purewick utilized overnight due to patient's frequent urination r/t OAB. PRN pain medications adjusted per [facility] order.  Pt able to sleep, Blood Glucose as ordered  - Assess for signs and symptoms of hyperglycemia and hypoglycemia  - Administer ordered medications to maintain glucose within target range  - Assess barriers to adequate nutritional intake and initiate nutrition consult as needed schedule  Outcome: Progressing     Problem: DISCHARGE PLANNING  Goal: Discharge to home or other facility with appropriate resources  Description  INTERVENTIONS:  - Identify barriers to discharge w/pt and caregiver  - Include patient/family/discharge partn

## 2020-03-11 NOTE — PROGRESS NOTES
[facility] Pharmacy Note:  Age Based Dose Adjustment    Johnathon Cross has been prescribed ketorolac (TORADOL) 30 mg IV x1. Patient is >71 years old therefore the dose has been adjusted to 15 mg IV x1.       Thank you,  Frank Sena, PharmD  3/10/2020 10:01 PM

## 2020-03-11 NOTE — PROGRESS NOTES
Subjective: Reports ongoing left knee pain primarily over the anterior knee. Pain slightly better today than yesterday. Having a difficult time ambulating with PT secondary to pan.  Denies chest pain, shortness of breath, calf pain, calf swelling, lighthead recommend venous doppler ultrasound LLE to rule out LLE DVT. Continue pain control: Norco 7.5mg/325 mg tablets. Continue DVT prophylaxis: Asprin 325 mg PO BID x 14 days post-operative.    Continue PT/OT: WBAT LLE with walker/assist.    Continue to keep

## 2020-03-11 NOTE — PLAN OF CARE
Patient progressing with physical therapy, ambulating with SBA with walker. Voiding freely, patient states history of frequent urination. States, \"gets up 5 times a night\". NORCO 7.5 for PRN pain control, patient reports increase in pain relief.  Diabetic for signs and symptoms of hyperglycemia and hypoglycemia  - Administer ordered medications to maintain glucose within target range  - Assess barriers to adequate nutritional intake and initiate nutrition consult as needed  - Instruct patient on self manage DISCHARGE PLANNING  Goal: Discharge to home or other facility with appropriate resources  Description  INTERVENTIONS:  - Identify barriers to discharge w/pt and caregiver  - Include patient/family/discharge partner in discharge planning  - Arrange for need

## 2020-03-11 NOTE — PHYSICAL THERAPY NOTE
PHYSICAL THERAPY KNEE TREATMENT NOTE - INPATIENT     Room Number: 431/431-A             Presenting Problem: L knee unicompartmental arthroplasty    Problem List  Active Problems:    Primary localized osteoarthritis of left knee      PHYSICAL THERAPY ASSESS (including a wheelchair)?: A Little   -   Need to walk in hospital room?: A Little   -   Climbing 3-5 steps with a railing?: A Little     AM-PAC Score:  Raw Score: 18   Approx Degree of Impairment: 46.58%   Standardized Score (AM-PAC Scale): 43.63   CMS Mo

## 2020-03-11 NOTE — PROGRESS NOTES
[facility] Hospitalist Progress Note     PCP: Hayden Blanchard MD    Chief Complaint: follow-up       Assessment/Plan:     68year old female with PMH including but not limited to HTN, IBS, DM who p/t EH for scheduled knee surgery by Dr. Jonathan Arita  # L Output 903 ml   Net 67 ml       Last 3 Weights  03/09/20 0922 : 160 lb (72.6 kg)  02/27/20 1418 : 157 lb (71.2 kg)  02/11/20 0936 : 158 lb (71.7 kg)  10/29/19 0910 : 157 lb (71.2 kg)      Exam  Gen: No acute distress, alert and oriented x3, no focal neur morphINE sulfate **OR** morphINE sulfate **OR** morphINE sulfate, glucose **OR** Glucose-Vitamin C **OR** dextrose **OR** glucose **OR** Glucose-Vitamin C

## 2020-03-12 VITALS
TEMPERATURE: 99 F | BODY MASS INDEX: 31.41 KG/M2 | HEIGHT: 60 IN | RESPIRATION RATE: 16 BRPM | WEIGHT: 160 LBS | DIASTOLIC BLOOD PRESSURE: 77 MMHG | HEART RATE: 104 BPM | OXYGEN SATURATION: 98 % | SYSTOLIC BLOOD PRESSURE: 141 MMHG

## 2020-03-12 LAB
DEPRECATED RDW RBC AUTO: 41.7 FL (ref 35.1–46.3)
ERYTHROCYTE [DISTWIDTH] IN BLOOD BY AUTOMATED COUNT: 12.7 % (ref 11–15)
GLUCOSE BLDC GLUCOMTR-MCNC: 210 MG/DL (ref 70–99)
GLUCOSE BLDC GLUCOMTR-MCNC: 271 MG/DL (ref 70–99)
HCT VFR BLD AUTO: 36.9 % (ref 35–48)
HGB BLD-MCNC: 12.6 G/DL (ref 12–16)
MCH RBC QN AUTO: 30.5 PG (ref 26–34)
MCHC RBC AUTO-ENTMCNC: 34.1 G/DL (ref 31–37)
MCV RBC AUTO: 89.3 FL (ref 80–100)
PLATELET # BLD AUTO: 311 10(3)UL (ref 150–450)
RBC # BLD AUTO: 4.13 X10(6)UL (ref 3.8–5.3)
WBC # BLD AUTO: 10.6 X10(3) UL (ref 4–11)

## 2020-03-12 PROCEDURE — 82962 GLUCOSE BLOOD TEST: CPT

## 2020-03-12 PROCEDURE — 97535 SELF CARE MNGMENT TRAINING: CPT

## 2020-03-12 PROCEDURE — 97110 THERAPEUTIC EXERCISES: CPT

## 2020-03-12 PROCEDURE — 97116 GAIT TRAINING THERAPY: CPT

## 2020-03-12 PROCEDURE — 85027 COMPLETE CBC AUTOMATED: CPT | Performed by: ORTHOPAEDIC SURGERY

## 2020-03-12 NOTE — PLAN OF CARE
Problem: PAIN - ADULT  Goal: Verbalizes/displays adequate comfort level or patient's stated pain goal  Description  INTERVENTIONS:  - Encourage pt to monitor pain and request assistance  - Assess pain using appropriate pain scale  - Administer analgesics resources and transportation as appropriate  - Identify discharge learning needs (meds, wound care, etc)  - Arrange for interpreters to assist at discharge as needed  - Consider post-discharge preferences of patient/family/discharge partner  - Complete LIVAN

## 2020-03-12 NOTE — DISCHARGE SUMMARY
[facility] Internal Medicine Discharge Summary   Patient ID:  Allison Cooper  [id number]  68year old  7/3/1950    Admit date: 3/9/2020     Discharge date and time: 3/12/2020     Attending Physician: Rajani Vance MD     Primary Care Physician: Deandra Murphy -Hyperglycemic protocol with accu-checks QID and low-dose sliding scale insulin. Will keep 1800 ADA diet. Will hold PO meds     # HTN  -CCB, resume arb as -180s  -IV hydralazine added as still up during day.  Likely pain contributing       # Neuro  - Test Blood Sugar Once Daily. Non-Insulin Dependent Diabetes Type II. E11.9.      AZO TABS OR     CHOLESTEROL DEFENSE OR     CINNAMON OR     * dilTIAZem HCl  MG Cp24  Commonly known as:  Dilt-XR  TAKE 1 CAPSULE BY MOUTH ONCE DAILY IN THE MORNING     * PROCEDURE: XR KNEE (1 OR 2 VIEWS), LEFT (CPT=73560)  COMPARISON: None. INDICATIONS: Post op left knee arthroplasty. TECHNIQUE: 2 views were obtained. FINDINGS:  BONES: Unicompartmental/medial compartment arthroplasty has been performed.  Hardware is in

## 2020-03-12 NOTE — PHYSICAL THERAPY NOTE
PHYSICAL THERAPY KNEE TREATMENT NOTE - INPATIENT     Room Number: 431/431-A             Presenting Problem: L knee unicompartmental arthroplasty    Problem List  Active Problems:    Primary localized osteoarthritis of left knee      PHYSICAL THERAPY ASSESS -   Need to walk in hospital room?: A Little   -   Climbing 3-5 steps with a railing?: A Little     AM-PAC Score:  Raw Score: 18   Approx Degree of Impairment: 46.58%   Standardized Score (AM-PAC Scale): 43.63   CMS Modifier (G-Code): CK    FUNCTIONAL AB

## 2020-03-12 NOTE — OCCUPATIONAL THERAPY NOTE
OCCUPATIONAL THERAPY TREATMENT NOTE - INPATIENT    Room Number: 431/431-A         Presenting Problem: L TKA     Problem List  Active Problems:    Primary localized osteoarthritis of left knee      OCCUPATIONAL THERAPY ASSESSMENT     Pt seen up in  an DAILY LIVING ASSESSMENT  AM-PAC ‘6-Clicks’ Inpatient Daily Activity Short Form  How much help from another person does the patient currently need…  -   Putting on and taking off regular lower body clothing?: A Little  -   Bathing (including washing, rinsin 3/17/2020  Frequency:3x/week

## 2020-03-12 NOTE — PROGRESS NOTES
Subjective: No complaints. Pain controlled. Able to walk stairs yesterday afternoon.     Objective:    Patient Vitals for the past 24 hrs:   BP Temp Temp src Pulse Resp SpO2   03/12/20 0536 131/66 99.1 °F (37.3 °C) Oral 90 18 96 %   03/11/20 2032 150/70 —

## 2020-03-24 ENCOUNTER — TELEPHONE (OUTPATIENT)
Dept: SURGERY | Facility: CLINIC | Age: 70
End: 2020-03-24

## 2020-03-24 RX ORDER — NITROFURANTOIN 25; 75 MG/1; MG/1
100 CAPSULE ORAL 2 TIMES DAILY
Qty: 10 CAPSULE | Refills: 0 | Status: SHIPPED | OUTPATIENT
Start: 2020-03-24 | End: 2020-03-29

## 2020-03-24 NOTE — TELEPHONE ENCOUNTER
Per pt woke up with a UTI, states there is traces blood in urine, pinkish in color. Is on antibiotics, is wanting to know if should increase dose. At the moment she is unable to travel due to knee replacement surgery.  Please advise

## 2020-03-24 NOTE — TELEPHONE ENCOUNTER
S/W pt and determined that she started having urinary frequency and slight burning with urination the other day and also saw that her urine was pink tinged. She denies fever and chills.  States that she took a dose of Azo this morning and thinks that she ma

## 2020-03-24 NOTE — TELEPHONE ENCOUNTER
Spoke to patient. We agreed to treat with Macrobid 100 mg PO BID x 5 days based on her symptoms. If no improvement may need to consider urine testing . She was agreeable. Hold keflex while taking macrobid, and may resume once complete.

## 2020-03-31 ENCOUNTER — TELEPHONE (OUTPATIENT)
Dept: SURGERY | Facility: CLINIC | Age: 70
End: 2020-03-31

## 2020-03-31 NOTE — TELEPHONE ENCOUNTER
Patient c/o dysuria; onset last night and this morning. Patient states she had a terrible episode of IBS last night, then knows that UTI follows right after. Patient finished course of Macrobid 3 days ago. States was feeling better.   Now, having dysur

## 2020-03-31 NOTE — TELEPHONE ENCOUNTER
Called patient. I recommended she take keflex 250 mg PO BID X 5 days then return to nightly dosing. She was agreeable. She will let us know if symptoms do not improve within 48 hours.

## 2020-04-03 ENCOUNTER — TELEPHONE (OUTPATIENT)
Dept: SURGERY | Facility: CLINIC | Age: 70
End: 2020-04-03

## 2020-04-03 DIAGNOSIS — R30.0 DYSURIA: Primary | ICD-10-CM

## 2020-04-03 NOTE — TELEPHONE ENCOUNTER
Ankush, please see patient's message below. Also, see 3/31/2020 TE. Thanks. I contacted patient. Patient states she increased her cephalexin as instructed 2 days ago, but states her UTI symptoms have not improved. Patient states she is going to use one of her standing orders to submit a urine specimen at lab. Rjai Epstein, I'm not sure patient has standing orders, if not, could you order? Thanks.

## 2020-04-04 ENCOUNTER — APPOINTMENT (OUTPATIENT)
Dept: LAB | Facility: HOSPITAL | Age: 70
End: 2020-04-04
Attending: NURSE PRACTITIONER
Payer: MEDICARE

## 2020-04-04 DIAGNOSIS — R30.0 DYSURIA: ICD-10-CM

## 2020-04-04 PROCEDURE — 87088 URINE BACTERIA CULTURE: CPT

## 2020-04-04 PROCEDURE — 87086 URINE CULTURE/COLONY COUNT: CPT

## 2020-04-04 PROCEDURE — 81001 URINALYSIS AUTO W/SCOPE: CPT

## 2020-04-04 PROCEDURE — 87186 SC STD MICRODIL/AGAR DIL: CPT

## 2020-04-06 ENCOUNTER — TELEPHONE (OUTPATIENT)
Dept: SURGERY | Facility: CLINIC | Age: 70
End: 2020-04-06

## 2020-04-06 NOTE — TELEPHONE ENCOUNTER
----- Message from WAI Hameed sent at 4/6/2020  8:56 AM CDT -----  Please let patient know her urine culture is positive.   It is a different bacteria than what she typically grows which is why it was not resolving with the antibiotics prescr

## 2020-04-06 NOTE — TELEPHONE ENCOUNTER
S/W pt and informed her of [facility]'s results msg as stated below and she verbalized understanding and will monitor her sugar and also for other SE's that Cipro can cause.

## 2020-04-07 NOTE — TELEPHONE ENCOUNTER
Pt called stating pt received a call yesterday from the nurse with the test results. Pt wants to know what type of bacteria infection.    Call pt to advise

## 2020-05-11 ENCOUNTER — TELEPHONE (OUTPATIENT)
Dept: OTOLARYNGOLOGY | Facility: CLINIC | Age: 70
End: 2020-05-11

## 2020-05-11 NOTE — TELEPHONE ENCOUNTER
Pt called stating in the last month pt has had 5 nose bleeds. Blood clots. Last night pt had a nose bleed that last hour and a half then stopped. Pt is requesting an appointment.   Please call

## 2020-05-11 NOTE — TELEPHONE ENCOUNTER
Advised pt we not see acitvely bleeding patients in office. Per pt she is not bleeding now. Advised pt if another nose bleed , pt to pinch fleshy part of nose, if bleeding last longer than 15 min, pt to go to ER.  Pt verbalized understanding, advised pt to

## 2020-05-15 ENCOUNTER — OFFICE VISIT (OUTPATIENT)
Dept: OTOLARYNGOLOGY | Facility: CLINIC | Age: 70
End: 2020-05-15
Payer: MEDICARE

## 2020-05-15 VITALS
DIASTOLIC BLOOD PRESSURE: 82 MMHG | TEMPERATURE: 97 F | SYSTOLIC BLOOD PRESSURE: 137 MMHG | WEIGHT: 152 LBS | HEIGHT: 60 IN | HEART RATE: 77 BPM | BODY MASS INDEX: 29.84 KG/M2

## 2020-05-15 DIAGNOSIS — R04.0 EPISTAXIS: Primary | ICD-10-CM

## 2020-05-15 PROCEDURE — G0463 HOSPITAL OUTPT CLINIC VISIT: HCPCS | Performed by: OTOLARYNGOLOGY

## 2020-05-15 PROCEDURE — 99213 OFFICE O/P EST LOW 20 MIN: CPT | Performed by: OTOLARYNGOLOGY

## 2020-05-15 PROCEDURE — 30901 CONTROL OF NOSEBLEED: CPT | Performed by: OTOLARYNGOLOGY

## 2020-05-15 RX ORDER — TRAMADOL HYDROCHLORIDE 50 MG/1
50 TABLET ORAL EVERY 4 HOURS PRN
COMMUNITY
Start: 2020-04-21 | End: 2020-11-04 | Stop reason: ALTCHOICE

## 2020-05-15 NOTE — PROGRESS NOTES
Sally Johnson is a 68year old female. Patient presents with:  Nose Bleed: nosebleeds from right nostril once every other week for 5 weeks     HPI:   She was seen about 2 years ago with recurrent episodes of epistaxis.   I cauterized her nose at that t E11.9. 100 each 3   • Probiotic Product (VSL#3) Oral Cap Take by mouth daily. • Multiple Vitamins-Minerals (MULTI VITAMIN/MINERALS) Oral Tab Take 1 tablet by mouth daily.           Past Medical History:   Diagnosis Date   • Cataract    • Chronic diarr nerves II through XII grossly intact. Neck Exam Normal Inspection - Normal. Palpation - Normal. Parotid gland - Normal. Thyroid gland - Normal.   Psychiatric Normal Orientation - Oriented to time, place, person & situation. Appropriate mood and affect.

## 2020-06-05 NOTE — TELEPHONE ENCOUNTER
Per pt she is not actively bleeding now. Had a nose bleed that 5 min on left side, per pt had blood clots.  Reviewed nose bleed precautions with patients,advised pt to avoid blowing nose, if bleeding were to restart bleeding, pt to pinch fleshy part of nose

## 2020-06-08 ENCOUNTER — OFFICE VISIT (OUTPATIENT)
Dept: OTOLARYNGOLOGY | Facility: CLINIC | Age: 70
End: 2020-06-08
Payer: MEDICARE

## 2020-06-08 VITALS
SYSTOLIC BLOOD PRESSURE: 144 MMHG | DIASTOLIC BLOOD PRESSURE: 80 MMHG | WEIGHT: 152 LBS | TEMPERATURE: 98 F | BODY MASS INDEX: 29.84 KG/M2 | HEIGHT: 60 IN

## 2020-06-08 DIAGNOSIS — R04.0 EPISTAXIS: Primary | ICD-10-CM

## 2020-06-08 PROCEDURE — G0463 HOSPITAL OUTPT CLINIC VISIT: HCPCS | Performed by: OTOLARYNGOLOGY

## 2020-06-08 PROCEDURE — 30901 CONTROL OF NOSEBLEED: CPT | Performed by: OTOLARYNGOLOGY

## 2020-06-08 PROCEDURE — 99213 OFFICE O/P EST LOW 20 MIN: CPT | Performed by: OTOLARYNGOLOGY

## 2020-06-08 NOTE — PROGRESS NOTES
Ofelia Darling is a 68year old female.  Patient presents with:  Nose Problem: pt here for a follow up on nose bleed of left nostril , per pt left nostril constinues to bleed, did not take aspirin today      HPI:   She was seen in the office about 4 wee each 3   • Probiotic Product (VSL#3) Oral Cap Take by mouth daily. • Multiple Vitamins-Minerals (MULTI VITAMIN/MINERALS) Oral Tab Take 1 tablet by mouth daily.           Past Medical History:   Diagnosis Date   • Cataract    • Chronic diarrhea 10/4/20 Neck Exam Normal Inspection - Normal. Palpation - Normal. Parotid gland - Normal. Thyroid gland - Normal.   Psychiatric Normal Orientation - Oriented to time, place, person & situation. Appropriate mood and affect.    Lymph Detail Normal Submental. Brandon Bri

## 2020-08-04 ENCOUNTER — TELEPHONE (OUTPATIENT)
Dept: SURGERY | Facility: CLINIC | Age: 70
End: 2020-08-04

## 2020-08-04 ENCOUNTER — OFFICE VISIT (OUTPATIENT)
Dept: FAMILY MEDICINE CLINIC | Facility: CLINIC | Age: 70
End: 2020-08-04
Payer: MEDICARE

## 2020-08-04 VITALS
TEMPERATURE: 99 F | SYSTOLIC BLOOD PRESSURE: 137 MMHG | DIASTOLIC BLOOD PRESSURE: 76 MMHG | OXYGEN SATURATION: 99 % | RESPIRATION RATE: 18 BRPM | HEART RATE: 90 BPM

## 2020-08-04 DIAGNOSIS — N30.91 CYSTITIS WITH HEMATURIA: ICD-10-CM

## 2020-08-04 DIAGNOSIS — R30.0 DYSURIA: Primary | ICD-10-CM

## 2020-08-04 LAB
BILIRUBIN: NEGATIVE
GLUCOSE (URINE DIPSTICK): NEGATIVE MG/DL
KETONES (URINE DIPSTICK): NEGATIVE MG/DL
MULTISTIX LOT#: ABNORMAL NUMERIC
NITRITE, URINE: NEGATIVE
PH, URINE: 5 (ref 4.5–8)
PROTEIN (URINE DIPSTICK): NEGATIVE MG/DL
SPECIFIC GRAVITY: 1.03 (ref 1–1.03)
URINE-COLOR: YELLOW
UROBILINOGEN,SEMI-QN: 0.2 MG/DL (ref 0–1.9)

## 2020-08-04 PROCEDURE — 87086 URINE CULTURE/COLONY COUNT: CPT | Performed by: NURSE PRACTITIONER

## 2020-08-04 PROCEDURE — 99213 OFFICE O/P EST LOW 20 MIN: CPT | Performed by: NURSE PRACTITIONER

## 2020-08-04 PROCEDURE — 81003 URINALYSIS AUTO W/O SCOPE: CPT | Performed by: NURSE PRACTITIONER

## 2020-08-04 RX ORDER — CEPHALEXIN 250 MG/1
250 CAPSULE ORAL DAILY
COMMUNITY
Start: 2020-05-31 | End: 2020-11-04 | Stop reason: ALTCHOICE

## 2020-08-04 RX ORDER — NITROFURANTOIN 25; 75 MG/1; MG/1
100 CAPSULE ORAL 2 TIMES DAILY
Qty: 10 CAPSULE | Refills: 0 | Status: SHIPPED | OUTPATIENT
Start: 2020-08-04 | End: 2020-08-09

## 2020-08-04 NOTE — PROGRESS NOTES
CHIEF COMPLAINT:   Patient presents with:  UTI: States that symptoms started today. HPI:   Allison Cooper is a 65year old female who presents with symptoms of UTI. Complaining of urinary frequency, urgency, and dysuria for last 1 days.  Symptoms h DAILY IN THE MORNING 30 capsule 11   • Nutritional Supplements (CHOLESTEROL DEFENSE OR) Take 1 tablet by mouth daily. • Blood Glucose Monitoring Suppl (ACCU-CHEK TYRONE) Does not apply Device Test Blood Sugar Once Daily.  Non-Insulin Dependent Diabetes T apparent distress  CARDIO: RRR, no murmurs  LUNGS: clear to ausculation bilaterally, no wheezing or rhonchi  GI: BS present x 4 and normoactive. No hepatosplenomegaly. : no suprapubic tenderness, No bladder distention or CVAT.     Recent Results (from t for this Visit:  Requested Prescriptions     Signed Prescriptions Disp Refills   • Nitrofurantoin Monohyd Macro (MACROBID) 100 MG Oral Cap 10 capsule 0     Sig: Take 1 capsule (100 mg total) by mouth 2 (two) times daily for 5 days.        Risk and benefits

## 2020-08-04 NOTE — PATIENT INSTRUCTIONS
Understanding Urinary Tract Infections (UTIs)   Most UTIs are caused by bacteria, but they may also be caused by viruses or fungi.  Bacteria from the bowel are the most common source of infection. The infection may start because of any of the following:

## 2020-08-05 NOTE — TELEPHONE ENCOUNTER
Spoke with pt states she went into another clinic yesterday and already had testing done and she is already being treated.

## 2020-09-24 ENCOUNTER — OFFICE VISIT (OUTPATIENT)
Dept: FAMILY MEDICINE CLINIC | Facility: CLINIC | Age: 70
End: 2020-09-24
Payer: MEDICARE

## 2020-09-24 VITALS
TEMPERATURE: 97 F | HEIGHT: 60 IN | HEART RATE: 79 BPM | BODY MASS INDEX: 28.86 KG/M2 | WEIGHT: 147 LBS | OXYGEN SATURATION: 95 % | SYSTOLIC BLOOD PRESSURE: 130 MMHG | DIASTOLIC BLOOD PRESSURE: 70 MMHG

## 2020-09-24 DIAGNOSIS — N30.90 CYSTITIS: ICD-10-CM

## 2020-09-24 DIAGNOSIS — R39.9 URINARY SYMPTOM OR SIGN: Primary | ICD-10-CM

## 2020-09-24 LAB
BILIRUBIN: NEGATIVE
GLUCOSE (URINE DIPSTICK): NEGATIVE MG/DL
KETONES (URINE DIPSTICK): NEGATIVE MG/DL
MULTISTIX LOT#: ABNORMAL NUMERIC
NITRITE, URINE: NEGATIVE
OCCULT BLOOD: NEGATIVE
PH, URINE: 6.5 (ref 4.5–8)
PROTEIN (URINE DIPSTICK): NEGATIVE MG/DL
SPECIFIC GRAVITY: 1.02 (ref 1–1.03)
URINE-COLOR: YELLOW
UROBILINOGEN,SEMI-QN: 0.2 MG/DL (ref 0–1.9)

## 2020-09-24 PROCEDURE — 87086 URINE CULTURE/COLONY COUNT: CPT | Performed by: NURSE PRACTITIONER

## 2020-09-24 PROCEDURE — 99213 OFFICE O/P EST LOW 20 MIN: CPT | Performed by: NURSE PRACTITIONER

## 2020-09-24 PROCEDURE — 81003 URINALYSIS AUTO W/O SCOPE: CPT | Performed by: NURSE PRACTITIONER

## 2020-09-24 RX ORDER — NITROFURANTOIN 25; 75 MG/1; MG/1
100 CAPSULE ORAL 2 TIMES DAILY
Qty: 10 CAPSULE | Refills: 0 | Status: SHIPPED | OUTPATIENT
Start: 2020-09-24 | End: 2020-09-29

## 2020-09-25 NOTE — PROGRESS NOTES
CHIEF COMPLAINT:   Patient presents with:  UTI: was seen in August for UTI, c/o urgency, dysuria, started today       HPI:   Pallavi Bah is a 65year old female who presents with symptoms of UTI.  Complaining of urinary frequency, urgency, and dysuri (ACCU-CHEK TYRONE) Does not apply Device Test Blood Sugar Once Daily. Non-Insulin Dependent Diabetes Type II. E11.9. 1 Device 0   • ACCU-CHEK SOFTCLIX LANCETS Does not apply Misc Test Blood Sugar Once Daily.  Non-Insulin Dependent Diabetes Type II. E11.9. 10 Wt 147 lb (66.7 kg)   SpO2 95%   BMI 28.71 kg/m²   GENERAL: well developed, well nourished, and in no apparent distress  CARDIO: RRR, no murmurs  LUNGS: clear to ausculation bilaterally, no wheezing or rhonchi  GI: BS present x 4 and normoactive.   No hepat of completing full course of antibiotic. Pt verbalizes understanding.

## 2020-11-04 PROBLEM — E11.319 DIABETIC RETINOPATHY OF LEFT EYE (HCC): Status: RESOLVED | Noted: 2018-10-01 | Resolved: 2020-11-04

## 2020-12-10 ENCOUNTER — HOSPITAL ENCOUNTER (OUTPATIENT)
Dept: CT IMAGING | Age: 70
Discharge: HOME | End: 2020-12-10
Attending: INTERNAL MEDICINE

## 2020-12-10 ENCOUNTER — HOSPITAL ENCOUNTER (OUTPATIENT)
Dept: LAB | Age: 70
Discharge: HOME | End: 2020-12-10
Attending: INTERNAL MEDICINE

## 2020-12-10 DIAGNOSIS — K86.2 CYST OF PANCREAS: Primary | ICD-10-CM

## 2020-12-10 DIAGNOSIS — K86.2 CYST OF PANCREAS: ICD-10-CM

## 2020-12-10 LAB
BUN SERPL-MCNC: 21 MG/DL (ref 6–20)
BUN/CREAT SERPL: 30 (ref 7–25)
CREAT SERPL-MCNC: 0.7 MG/DL (ref 0.51–0.95)
GFR SERPLBLD BASED ON 1.73 SQ M-ARVRAT: 87 ML/MIN/1.73M2

## 2020-12-10 PROCEDURE — 10002805 HB CONTRAST AGENT: Performed by: INTERNAL MEDICINE

## 2020-12-10 PROCEDURE — 36415 COLL VENOUS BLD VENIPUNCTURE: CPT | Performed by: INTERNAL MEDICINE

## 2020-12-10 PROCEDURE — 82565 ASSAY OF CREATININE: CPT | Performed by: INTERNAL MEDICINE

## 2020-12-10 PROCEDURE — 74178 CT ABD&PLV WO CNTR FLWD CNTR: CPT

## 2020-12-10 RX ADMIN — IOHEXOL 100 ML: 350 INJECTION, SOLUTION INTRAVENOUS at 12:30

## 2021-02-09 DIAGNOSIS — Z23 NEED FOR VACCINATION: ICD-10-CM

## 2021-02-09 PROBLEM — N30.90 CYSTITIS: Status: RESOLVED | Noted: 2020-09-24 | Resolved: 2021-02-09

## 2021-02-09 PROBLEM — K58.0 IRRITABLE BOWEL SYNDROME WITH DIARRHEA: Status: ACTIVE | Noted: 2021-02-09

## 2021-02-09 PROBLEM — R93.1 ELEVATED CORONARY ARTERY CALCIUM SCORE: Status: ACTIVE | Noted: 2021-02-09

## 2021-03-15 ENCOUNTER — OFFICE VISIT (OUTPATIENT)
Dept: FAMILY MEDICINE CLINIC | Facility: CLINIC | Age: 71
End: 2021-03-15
Payer: MEDICARE

## 2021-03-15 VITALS
OXYGEN SATURATION: 98 % | HEART RATE: 69 BPM | SYSTOLIC BLOOD PRESSURE: 128 MMHG | RESPIRATION RATE: 16 BRPM | DIASTOLIC BLOOD PRESSURE: 70 MMHG | WEIGHT: 138 LBS | TEMPERATURE: 98 F | BODY MASS INDEX: 27.09 KG/M2 | HEIGHT: 60 IN

## 2021-03-15 DIAGNOSIS — R30.0 DYSURIA: Primary | ICD-10-CM

## 2021-03-15 LAB
BILIRUBIN: NEGATIVE
GLUCOSE (URINE DIPSTICK): NEGATIVE MG/DL
MULTISTIX LOT#: 5021 NUMERIC
NITRITE, URINE: NEGATIVE
OCCULT BLOOD: NEGATIVE
PH, URINE: 5 (ref 4.5–8)
PROTEIN (URINE DIPSTICK): NEGATIVE MG/DL
SPECIFIC GRAVITY: >=1.03 (ref 1–1.03)
URINE-COLOR: YELLOW
UROBILINOGEN,SEMI-QN: 0.2 MG/DL (ref 0–1.9)

## 2021-03-15 PROCEDURE — 87186 SC STD MICRODIL/AGAR DIL: CPT | Performed by: PHYSICIAN ASSISTANT

## 2021-03-15 PROCEDURE — 81003 URINALYSIS AUTO W/O SCOPE: CPT | Performed by: PHYSICIAN ASSISTANT

## 2021-03-15 PROCEDURE — 87077 CULTURE AEROBIC IDENTIFY: CPT | Performed by: PHYSICIAN ASSISTANT

## 2021-03-15 PROCEDURE — 99213 OFFICE O/P EST LOW 20 MIN: CPT | Performed by: PHYSICIAN ASSISTANT

## 2021-03-15 PROCEDURE — 87086 URINE CULTURE/COLONY COUNT: CPT | Performed by: PHYSICIAN ASSISTANT

## 2021-03-15 RX ORDER — CEPHALEXIN 500 MG/1
500 CAPSULE ORAL 2 TIMES DAILY
Qty: 10 CAPSULE | Refills: 0 | Status: SHIPPED | OUTPATIENT
Start: 2021-03-15 | End: 2021-03-20

## 2021-03-15 NOTE — PATIENT INSTRUCTIONS
We will call you in 48-72 hours with results to urine culture  · Complete full course of antibiotics. · Over the counter Tylenol/Motrin as needed for pain/discomfort. · Follow up in 2-3 days if symptoms do not improve or worsen.     · Return to clinic or

## 2021-03-15 NOTE — PROGRESS NOTES
CHIEF COMPLAINT:   Patient presents with:  UTI    HPI:   Pawel Hickman is a 65year old female who presents with symptoms of UTI. Complaining of urinary frequency, urgency, dysuria for last 24 hours. Symptoms have been worsening since onset.   Treatm daily.       • Multiple Vitamins-Minerals (MULTI VITAMIN/MINERALS) Oral Tab Take 1 tablet by mouth daily.        • ASCOMP-CODEINE -33-30 MG Oral Cap TAKE 1 CAPSULE BY MOUTH EVERY 4 TO 6 HOURS AS NEEDED (Patient not taking: Reported on 3/15/2021) 15 ca no CVAT   EXTREMITIES: no edema  LYMPH: no lymphadenopathy   Recent Results (from the past 24 hour(s))   URINALYSIS, AUTO, W/O SCOPE    Collection Time: 03/15/21  9:10 AM   Result Value Ref Range    Glucose Urine Negative mg/dL    Bilirubin Negative Negati after you have sex. If you are a woman, it is important to:   Keep the area around your vagina clean. Wipe from front to back after you go to the bathroom. Gently wash the area around your vagina when you bathe or shower. Wear cotton underwear.    U

## 2021-03-17 ENCOUNTER — HOSPITAL ENCOUNTER (OUTPATIENT)
Dept: LAB | Age: 71
Discharge: HOME | End: 2021-03-17
Attending: INTERNAL MEDICINE

## 2021-03-17 DIAGNOSIS — K86.2 CYST OF PANCREAS: ICD-10-CM

## 2021-03-17 DIAGNOSIS — R19.7 DIARRHEA, UNSPECIFIED TYPE: ICD-10-CM

## 2021-03-17 DIAGNOSIS — R10.84 GENERALIZED ABDOMINAL PAIN: Primary | ICD-10-CM

## 2021-03-17 DIAGNOSIS — D49.0 INTRADUCTAL PAPILLARY MUCINOUS NEOPLASM: ICD-10-CM

## 2021-03-17 DIAGNOSIS — R10.84 GENERALIZED ABDOMINAL PAIN: ICD-10-CM

## 2021-03-17 LAB
BUN SERPL-MCNC: 18 MG/DL (ref 6–20)
BUN/CREAT SERPL: 27 (ref 7–25)
CREAT SERPL-MCNC: 0.67 MG/DL (ref 0.51–0.95)
GFR SERPLBLD BASED ON 1.73 SQ M-ARVRAT: 88 ML/MIN/1.73M2

## 2021-03-17 PROCEDURE — 84520 ASSAY OF UREA NITROGEN: CPT | Performed by: CLINICAL MEDICAL LABORATORY

## 2021-03-17 PROCEDURE — 82565 ASSAY OF CREATININE: CPT | Performed by: CLINICAL MEDICAL LABORATORY

## 2021-03-17 PROCEDURE — 36415 COLL VENOUS BLD VENIPUNCTURE: CPT | Performed by: CLINICAL MEDICAL LABORATORY

## 2021-03-20 ENCOUNTER — HOSPITAL ENCOUNTER (OUTPATIENT)
Dept: CT IMAGING | Age: 71
Discharge: HOME | End: 2021-03-20
Attending: INTERNAL MEDICINE

## 2021-03-20 DIAGNOSIS — K86.2 CYST OF PANCREAS: ICD-10-CM

## 2021-03-20 PROCEDURE — 74178 CT ABD&PLV WO CNTR FLWD CNTR: CPT

## 2021-03-20 PROCEDURE — 10002805 HB CONTRAST AGENT: Performed by: INTERNAL MEDICINE

## 2021-03-20 RX ADMIN — IOHEXOL 100 ML: 350 INJECTION, SOLUTION INTRAVENOUS at 12:00

## 2021-04-16 ENCOUNTER — LAB ENCOUNTER (OUTPATIENT)
Dept: LAB | Facility: HOSPITAL | Age: 71
End: 2021-04-16
Attending: ORTHOPAEDIC SURGERY
Payer: MEDICARE

## 2021-04-16 DIAGNOSIS — Z98.890 H/O ARTHROPLASTY: ICD-10-CM

## 2021-04-16 DIAGNOSIS — M25.579: Primary | ICD-10-CM

## 2021-04-16 DIAGNOSIS — M19.90 ARTHRITIS: ICD-10-CM

## 2021-04-16 PROCEDURE — 85652 RBC SED RATE AUTOMATED: CPT

## 2021-04-16 PROCEDURE — 85025 COMPLETE CBC W/AUTO DIFF WBC: CPT

## 2021-04-16 PROCEDURE — 86140 C-REACTIVE PROTEIN: CPT

## 2021-04-16 PROCEDURE — 36415 COLL VENOUS BLD VENIPUNCTURE: CPT

## 2021-08-11 ENCOUNTER — OFFICE VISIT (OUTPATIENT)
Dept: OTOLARYNGOLOGY | Facility: CLINIC | Age: 71
End: 2021-08-11
Payer: MEDICARE

## 2021-08-11 VITALS — HEIGHT: 60 IN | WEIGHT: 139 LBS | BODY MASS INDEX: 27.29 KG/M2

## 2021-08-11 DIAGNOSIS — R05.3 CHRONIC COUGH: Primary | ICD-10-CM

## 2021-08-11 DIAGNOSIS — J34.2 NASAL SEPTAL DEVIATION: ICD-10-CM

## 2021-08-11 DIAGNOSIS — R04.0 EPISTAXIS: ICD-10-CM

## 2021-08-11 PROCEDURE — 31575 DIAGNOSTIC LARYNGOSCOPY: CPT | Performed by: SPECIALIST

## 2021-08-11 PROCEDURE — 99213 OFFICE O/P EST LOW 20 MIN: CPT | Performed by: SPECIALIST

## 2021-08-11 RX ORDER — TRAMADOL HYDROCHLORIDE 50 MG/1
TABLET ORAL
COMMUNITY
Start: 2021-07-15 | End: 2021-11-09

## 2021-08-11 NOTE — PATIENT INSTRUCTIONS
No greasy foods, spicy foods, chocolate, caffeine, alcohol, spearmint, peppermint, lemon, lime, orange, grapefruit, tomatoes. Don't eat 2 hours before bedtime  Elevate the head of bed  Trial of over-the-counter Prilosec.   Call me in 4 weeks time, sooner i

## 2021-08-11 NOTE — PROGRESS NOTES
Vivien Camacho is a 66year old female. Patient presents with:  Throat Problem: pt is constantly clearing her throat and has a dry cough. She states this has been an on going thing so a while now    HPI:   Throat clearing.     Current Outpatient Medicat • Nutritional Supplements (CHOLESTEROL DEFENSE OR) Take 1 tablet by mouth daily.  (Patient not taking: Reported on 8/11/2021 )        Past Medical History:   Diagnosis Date   • Agatston coronary artery calcium score between 200 and 399 11/2020    score 35 Oral/Oropharynx Lips - Normal, Tonsils - Normal, Tongue - Normal    Neck Exam Inspection - Normal. Palpation - Normal. Parotid gland - Normal. Thyroid gland - Normal.   Lymph Detail Submental. Submandibular.  Anterior cervical. Posterior cervical. Supracl

## 2021-08-18 ENCOUNTER — OFFICE VISIT (OUTPATIENT)
Dept: GASTROENTEROLOGY | Facility: CLINIC | Age: 71
End: 2021-08-18
Payer: MEDICARE

## 2021-08-18 VITALS
WEIGHT: 147.38 LBS | BODY MASS INDEX: 28.93 KG/M2 | SYSTOLIC BLOOD PRESSURE: 146 MMHG | DIASTOLIC BLOOD PRESSURE: 83 MMHG | HEIGHT: 60 IN | HEART RATE: 64 BPM

## 2021-08-18 DIAGNOSIS — K58.0 IRRITABLE BOWEL SYNDROME WITH DIARRHEA: Primary | ICD-10-CM

## 2021-08-18 DIAGNOSIS — K52.9 CHRONIC DIARRHEA: ICD-10-CM

## 2021-08-18 PROCEDURE — 99204 OFFICE O/P NEW MOD 45 MIN: CPT | Performed by: INTERNAL MEDICINE

## 2021-08-18 NOTE — PROGRESS NOTES
HPI:    Patient ID: Pawel Hickman is a ela 66year old woman with history of Type 2 diabetes, hypertension, dyslipidemia who presents today to transition care to our GI practice. Ms. Tamy Ceron describes a 50-year history of chronic diarrhea.   This diarrhea. Took a full of 28 days of the rifaximin in February 2021 and the diarrhea persisted through March. Diarrhea can be controllable. However, no associated abdominal cramping.     Repeated evaluations including celiac testing, colonic biopsies, s  ACCESSION:                                                             Verified:03/27/2014 12:30        Test Name: Senthil Barfield  0581    Location History: [address]                [address] [facility], [address] [place] [address], lesions identified. Spleen and adrenal glands appear within normal limits. No calcified or obstructing or tract stones identified. No hydronephrosis.    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 POSTOPERATIVE DIAGNOSIS(ES): Moderate internal hemorrhoids, rare diverticular  disease with no evidence of polyps. Random biopsies were taken for  microscopic evidence of microscopic colitis. OPERATION: Colonoscopy.     SURGEON topiramate 50 MG Oral Tab Take 1.5 tablets (75 mg total) by mouth nightly.  AT BEDTIME 135 tablet 1   • dilTIAZem HCl ER (DILT-XR) 240 MG Oral Capsule SR 24 Hr TAKE 1 CAPSULE BY MOUTH ONCE DAILY IN THE MORNING 90 capsule 3   • estradiol (ESTRACE) 0.5 MG Ora Coughing  Imaging: No results found. PHYSICAL EXAM:   Physical Exam           ASSESSMENT/PLAN:     Laboratory data:    4/16/2021 labs:  CBC was normal with hemoglobin 13.5g  CRP < 0.29    5/20/2021 labs:   Total cholesterol 245, triglycerides days.  · No such effect from Imodium. Imodium does not work. · Repeated courses of rifaximin antibiotic have been very helpful since 2015. Previous doctors have given her free samples.   She took several courses from 2015 onwards, including 2 courses in cholestyramine resin in the past.  Today I discussed consideration for Colestid tablets 1 g twice daily as a preventative measure. At this point it does not sound like Ms. Alarcona Chivo would want to add a daily medication to her medication list just for prevent requested or ordered in this encounter       Imaging & Referrals:  None       #[phone]

## 2021-08-19 ENCOUNTER — PATIENT MESSAGE (OUTPATIENT)
Dept: GASTROENTEROLOGY | Facility: CLINIC | Age: 71
End: 2021-08-19

## 2021-08-19 NOTE — TELEPHONE ENCOUNTER
From: Mali Stands  To: Trinity Griggs MD  Sent: 8/19/2021 10:19 AM CDT  Subject: Non-Urgent Medical Question    I visited with the  yesterday for a IBS consultation. We discussed a prescription for Xifaxan.  The pharmacy called me, the m

## 2021-08-20 ENCOUNTER — PATIENT MESSAGE (OUTPATIENT)
Dept: GASTROENTEROLOGY | Facility: CLINIC | Age: 71
End: 2021-08-20

## 2021-08-21 NOTE — TELEPHONE ENCOUNTER
Dr. DE SOUZA/TP RN-    Please assist pt with a paper prescription for rifaximin to explore less expensive options for payment through a [address] [place] [address] J, thank you.

## 2021-08-24 ENCOUNTER — PATIENT MESSAGE (OUTPATIENT)
Dept: GASTROENTEROLOGY | Facility: CLINIC | Age: 71
End: 2021-08-24

## 2021-08-24 NOTE — TELEPHONE ENCOUNTER
Dr Ember Goldberg,    Due to [facility] insurance Rifaximin is still over $800.00     Please provide a paper script and I can fax it to a [email or web address] for the pt

## 2021-08-24 NOTE — TELEPHONE ENCOUNTER
From: Tyrell Rosen  To: Gretel Rodriguez MD  Sent: 8/24/2021 12:08 PM CDT  Subject: Prescription Question    Aug 24, 2021  On Friday I returned a message from [facility], in regards to a prescription Rigaximin(Xifaxan).  Dr. Jillian Post prescribed, th

## 2021-08-24 NOTE — TELEPHONE ENCOUNTER
I spoke to the pt and I let her know that I have asked Dr Sj Knight for a paper prescription for the Rifaximin

## 2021-08-26 ENCOUNTER — TELEPHONE (OUTPATIENT)
Dept: OTOLARYNGOLOGY | Facility: CLINIC | Age: 71
End: 2021-08-26

## 2021-08-26 NOTE — TELEPHONE ENCOUNTER
Patient states she would like to let Dr. Marce Denise know that medication below did not work for her. She is currently taking a dicongestion that seems to help out better. Prilosec.

## 2021-08-26 NOTE — TELEPHONE ENCOUNTER
Dr. Manoj Luceor, [id number] patient tried Prilosec for 2 weeks and states it didn't help. She's using generic claritin and it's helping.

## 2021-08-26 NOTE — TELEPHONE ENCOUNTER
Spoke with patient and she verbalizes understanding. Will notify us if she wants to try prescription strength prilosec.

## 2021-08-26 NOTE — TELEPHONE ENCOUNTER
Thanks Winston Vergara. That is just awful. I will need to print that prescription next time I am in the office.

## 2021-08-26 NOTE — TELEPHONE ENCOUNTER
I guess whatever works. She is on an OTC prilosec, possible that the strength is too low. She can continue the claritin. If her symptoms return, I would consider a prescription strength prilosec.

## 2021-08-31 NOTE — TELEPHONE ENCOUNTER
I spoke to the pt. She will come to the office to  her prescription.  [address] [facility] location

## 2021-09-01 ENCOUNTER — PATIENT MESSAGE (OUTPATIENT)
Dept: GASTROENTEROLOGY | Facility: CLINIC | Age: 71
End: 2021-09-01

## 2021-09-02 NOTE — TELEPHONE ENCOUNTER
From: Rose Siu  To: Valentina Vieira MD  Sent: 9/1/2021 5:37 PM CDT  Subject: Non-Urgent Medical Question    Sept. 1 2021  Jesse Jordan RN  [facility]  [place] got the prescription yesteryday from the office and and am sending it to

## 2021-10-12 ENCOUNTER — PATIENT MESSAGE (OUTPATIENT)
Dept: GASTROENTEROLOGY | Facility: CLINIC | Age: 71
End: 2021-10-12

## 2021-10-12 NOTE — TELEPHONE ENCOUNTER
From: Alejandro Vega  To: Steve Rhodes MD  Sent: 10/12/2021 9:50 AM CDT  Subject: Insurance    This question is for [employer].  Hi Jenine Pallas this is Mary Tucker I am thinking about changing Medical insurance and would like to know if Dr. Gage Briggs

## 2021-10-13 NOTE — TELEPHONE ENCOUNTER
From: Manuel Young  Sent: 10/12/2021 4:52 PM CDT  To: [facility] Clinical Staff  Subject: Insurance    Thanks Earle Reed, all is well with me, I hope all is well with you? Thank you for your prompt attention to my question!  It’s much appreciated when changing I

## 2022-02-04 ENCOUNTER — APPOINTMENT (OUTPATIENT)
Dept: CT IMAGING | Facility: HOSPITAL | Age: 72
DRG: 638 | End: 2022-02-04
Attending: EMERGENCY MEDICINE
Payer: MEDICARE

## 2022-02-04 ENCOUNTER — HOSPITAL ENCOUNTER (INPATIENT)
Facility: HOSPITAL | Age: 72
LOS: 2 days | Discharge: HOME | DRG: 638 | End: 2022-02-06
Attending: EMERGENCY MEDICINE | Admitting: INTERNAL MEDICINE
Payer: MEDICARE

## 2022-02-04 DIAGNOSIS — R10.11 ABDOMINAL PAIN, RIGHT UPPER QUADRANT: ICD-10-CM

## 2022-02-04 DIAGNOSIS — R74.01 TRANSAMINITIS: Primary | ICD-10-CM

## 2022-02-04 LAB
ALBUMIN SERPL-MCNC: 4 G/DL (ref 3.4–5)
ALBUMIN/GLOB SERPL: 1.2 {RATIO} (ref 1–2)
ALP LIVER SERPL-CCNC: 173 U/L
ALT SERPL-CCNC: 812 U/L
APAP SERPL-MCNC: <2 UG/ML (ref 10–30)
AST SERPL-CCNC: 318 U/L (ref 15–37)
BILIRUB SERPL-MCNC: 0.6 MG/DL (ref 0.1–2)
BUN BLD-MCNC: 14 MG/DL (ref 7–18)
BUN/CREAT SERPL: 20 (ref 10–20)
CALCIUM BLD-MCNC: 9.9 MG/DL (ref 8.5–10.1)
CHLORIDE SERPL-SCNC: 108 MMOL/L (ref 98–112)
CK SERPL-CCNC: 31 U/L
CO2 SERPL-SCNC: 21 MMOL/L (ref 21–32)
CREAT BLD-MCNC: 0.7 MG/DL
GLOBULIN PLAS-MCNC: 3.3 G/DL (ref 2.8–4.4)
GLUCOSE BLD-MCNC: 219 MG/DL (ref 70–99)
GLUCOSE BLDC GLUCOMTR-MCNC: 121 MG/DL (ref 70–99)
GLUCOSE BLDC GLUCOMTR-MCNC: 137 MG/DL (ref 70–99)
HAV IGM SER QL: NONREACTIVE
HBV CORE IGM SER QL: NONREACTIVE
HBV SURFACE AG SERPL QL IA: NONREACTIVE
HCV AB SERPL QL IA: NONREACTIVE
LIPASE SERPL-CCNC: 93 U/L (ref 73–393)
OSMOLALITY SERPL CALC.SUM OF ELEC: 291 MOSM/KG (ref 275–295)
POTASSIUM SERPL-SCNC: 4 MMOL/L (ref 3.5–5.1)
PROT SERPL-MCNC: 7.3 G/DL (ref 6.4–8.2)
SALICYLATES SERPL-MCNC: <1.7 MG/DL (ref 2.8–20)
SARS-COV-2 RNA RESP QL NAA+PROBE: NOT DETECTED
SODIUM SERPL-SCNC: 137 MMOL/L (ref 136–145)

## 2022-02-04 PROCEDURE — 74177 CT ABD & PELVIS W/CONTRAST: CPT | Performed by: EMERGENCY MEDICINE

## 2022-02-04 PROCEDURE — 99223 1ST HOSP IP/OBS HIGH 75: CPT | Performed by: INTERNAL MEDICINE

## 2022-02-04 RX ORDER — LOSARTAN POTASSIUM 50 MG/1
50 TABLET ORAL DAILY
Refills: 3 | Status: DISCONTINUED | OUTPATIENT
Start: 2022-02-05 | End: 2022-02-05

## 2022-02-04 RX ORDER — METOCLOPRAMIDE HYDROCHLORIDE 5 MG/ML
10 INJECTION INTRAMUSCULAR; INTRAVENOUS EVERY 8 HOURS PRN
Status: DISCONTINUED | OUTPATIENT
Start: 2022-02-04 | End: 2022-02-06

## 2022-02-04 RX ORDER — BISACODYL 10 MG
10 SUPPOSITORY, RECTAL RECTAL
Status: DISCONTINUED | OUTPATIENT
Start: 2022-02-04 | End: 2022-02-06

## 2022-02-04 RX ORDER — MORPHINE SULFATE 4 MG/ML
4 INJECTION, SOLUTION INTRAMUSCULAR; INTRAVENOUS EVERY 2 HOUR PRN
Status: DISCONTINUED | OUTPATIENT
Start: 2022-02-04 | End: 2022-02-06

## 2022-02-04 RX ORDER — SENNOSIDES 8.6 MG
17.2 TABLET ORAL NIGHTLY PRN
Status: DISCONTINUED | OUTPATIENT
Start: 2022-02-04 | End: 2022-02-06

## 2022-02-04 RX ORDER — DEXTROSE MONOHYDRATE 25 G/50ML
50 INJECTION, SOLUTION INTRAVENOUS
Status: DISCONTINUED | OUTPATIENT
Start: 2022-02-04 | End: 2022-02-06

## 2022-02-04 RX ORDER — GARLIC EXTRACT 500 MG
1 CAPSULE ORAL DAILY
Status: DISCONTINUED | OUTPATIENT
Start: 2022-02-05 | End: 2022-02-06

## 2022-02-04 RX ORDER — ONDANSETRON 2 MG/ML
4 INJECTION INTRAMUSCULAR; INTRAVENOUS EVERY 6 HOURS PRN
Status: DISCONTINUED | OUTPATIENT
Start: 2022-02-04 | End: 2022-02-06

## 2022-02-04 RX ORDER — DILTIAZEM HYDROCHLORIDE 120 MG/1
240 CAPSULE, EXTENDED RELEASE ORAL DAILY
Status: DISCONTINUED | OUTPATIENT
Start: 2022-02-05 | End: 2022-02-06

## 2022-02-04 RX ORDER — DILTIAZEM HYDROCHLORIDE 120 MG/1
120 CAPSULE, EXTENDED RELEASE ORAL DAILY
Status: DISCONTINUED | OUTPATIENT
Start: 2022-02-05 | End: 2022-02-06

## 2022-02-04 RX ORDER — MORPHINE SULFATE 2 MG/ML
2 INJECTION, SOLUTION INTRAMUSCULAR; INTRAVENOUS EVERY 2 HOUR PRN
Status: DISCONTINUED | OUTPATIENT
Start: 2022-02-04 | End: 2022-02-06

## 2022-02-04 RX ORDER — ESTRADIOL 0.5 MG/1
0.5 TABLET ORAL DAILY
Status: DISCONTINUED | OUTPATIENT
Start: 2022-02-04 | End: 2022-02-06

## 2022-02-04 RX ORDER — SODIUM PHOSPHATE, DIBASIC AND SODIUM PHOSPHATE, MONOBASIC 7; 19 G/133ML; G/133ML
1 ENEMA RECTAL ONCE AS NEEDED
Status: DISCONTINUED | OUTPATIENT
Start: 2022-02-04 | End: 2022-02-06

## 2022-02-04 RX ORDER — MORPHINE SULFATE 2 MG/ML
1 INJECTION, SOLUTION INTRAMUSCULAR; INTRAVENOUS EVERY 2 HOUR PRN
Status: DISCONTINUED | OUTPATIENT
Start: 2022-02-04 | End: 2022-02-06

## 2022-02-04 RX ORDER — POLYETHYLENE GLYCOL 3350 17 G/17G
17 POWDER, FOR SOLUTION ORAL DAILY PRN
Status: DISCONTINUED | OUTPATIENT
Start: 2022-02-04 | End: 2022-02-06

## 2022-02-04 NOTE — ED INITIAL ASSESSMENT (HPI)
Patient sent in by PCP for elevated liver enzymes. Patient reports severe RUQ pain.  Denies N/V and fevers

## 2022-02-04 NOTE — ED QUICK NOTES
Orders for admission, patient is aware of plan and ready to go upstairs. Any questions, please call ED RN Chrissy Khanna at extension [phone].      Patient Covid vaccination status: Unvaccinated     COVID Test Ordered in ED: Rapid SARS-CoV-2 by PCR    COVID Suspicion at Admission: N/A    Running Infusions:      Mental Status/LOC at time of transport: aox4    Other pertinent information: Pt from home with family, on room air, independent with care  CIWA score: N/A   NIH score:  N/A

## 2022-02-05 ENCOUNTER — TELEPHONE (OUTPATIENT)
Dept: GASTROENTEROLOGY | Facility: CLINIC | Age: 72
End: 2022-02-05

## 2022-02-05 LAB
ALBUMIN SERPL-MCNC: 3.6 G/DL (ref 3.4–5)
ALBUMIN/GLOB SERPL: 1.2 {RATIO} (ref 1–2)
ALP LIVER SERPL-CCNC: 153 U/L
ALT SERPL-CCNC: 535 U/L
ANION GAP SERPL CALC-SCNC: 7 MMOL/L (ref 0–18)
AST SERPL-CCNC: 126 U/L (ref 15–37)
BASOPHILS # BLD AUTO: 0.04 X10(3) UL (ref 0–0.2)
BASOPHILS NFR BLD AUTO: 0.6 %
BILIRUB SERPL-MCNC: 0.7 MG/DL (ref 0.1–2)
BUN BLD-MCNC: 10 MG/DL (ref 7–18)
BUN/CREAT SERPL: 18.9 (ref 10–20)
CALCIUM BLD-MCNC: 9.4 MG/DL (ref 8.5–10.1)
CHLORIDE SERPL-SCNC: 108 MMOL/L (ref 98–112)
CO2 SERPL-SCNC: 25 MMOL/L (ref 21–32)
CREAT BLD-MCNC: 0.53 MG/DL
DEPRECATED RDW RBC AUTO: 40.8 FL (ref 35.1–46.3)
EOSINOPHIL # BLD AUTO: 0.2 X10(3) UL (ref 0–0.7)
EOSINOPHIL NFR BLD AUTO: 3 %
ERYTHROCYTE [DISTWIDTH] IN BLOOD BY AUTOMATED COUNT: 12.3 % (ref 11–15)
GLOBULIN PLAS-MCNC: 3 G/DL (ref 2.8–4.4)
GLUCOSE BLD-MCNC: 162 MG/DL (ref 70–99)
GLUCOSE BLDC GLUCOMTR-MCNC: 137 MG/DL (ref 70–99)
GLUCOSE BLDC GLUCOMTR-MCNC: 186 MG/DL (ref 70–99)
GLUCOSE BLDC GLUCOMTR-MCNC: 200 MG/DL (ref 70–99)
GLUCOSE BLDC GLUCOMTR-MCNC: 247 MG/DL (ref 70–99)
GLUCOSE BLDC GLUCOMTR-MCNC: 283 MG/DL (ref 70–99)
HCT VFR BLD AUTO: 39.3 %
HGB BLD-MCNC: 12.5 G/DL
IMM GRANULOCYTES # BLD AUTO: 0.03 X10(3) UL (ref 0–1)
IMM GRANULOCYTES NFR BLD: 0.4 %
INR BLD: 1.02 (ref 0.8–1.2)
LYMPHOCYTES # BLD AUTO: 1.69 X10(3) UL (ref 1–4)
LYMPHOCYTES NFR BLD AUTO: 25.1 %
MAGNESIUM SERPL-MCNC: 1.6 MG/DL (ref 1.7–2.8)
MCH RBC QN AUTO: 28.9 PG (ref 26–34)
MCV RBC AUTO: 91 FL
MONOCYTES # BLD AUTO: 0.59 X10(3) UL (ref 0.1–1)
MONOCYTES NFR BLD AUTO: 8.8 %
NEUTROPHILS # BLD AUTO: 4.18 X10 (3) UL (ref 1.5–7.7)
NEUTROPHILS # BLD AUTO: 4.18 X10(3) UL (ref 1.5–7.7)
NEUTROPHILS NFR BLD AUTO: 62.1 %
OSMOLALITY SERPL CALC.SUM OF ELEC: 293 MOSM/KG (ref 275–295)
PLATELET # BLD AUTO: 210 10(3)UL (ref 150–450)
POTASSIUM SERPL-SCNC: 4.1 MMOL/L (ref 3.5–5.1)
PROT SERPL-MCNC: 6.6 G/DL (ref 6.4–8.2)
PROTHROMBIN TIME: 13.5 SECONDS (ref 11.6–14.8)
RBC # BLD AUTO: 4.32 X10(6)UL
SODIUM SERPL-SCNC: 140 MMOL/L (ref 136–145)
WBC # BLD AUTO: 6.7 X10(3) UL (ref 4–11)

## 2022-02-05 PROCEDURE — 99232 SBSQ HOSP IP/OBS MODERATE 35: CPT | Performed by: INTERNAL MEDICINE

## 2022-02-05 RX ORDER — LOSARTAN POTASSIUM 50 MG/1
50 TABLET ORAL NIGHTLY
Status: DISCONTINUED | OUTPATIENT
Start: 2022-02-05 | End: 2022-02-06

## 2022-02-05 RX ORDER — ALPRAZOLAM 1 MG/1
1 TABLET ORAL NIGHTLY PRN
Status: DISCONTINUED | OUTPATIENT
Start: 2022-02-05 | End: 2022-02-06

## 2022-02-05 RX ORDER — SUMATRIPTAN 25 MG/1
25 TABLET, FILM COATED ORAL EVERY 6 HOURS PRN
Status: DISCONTINUED | OUTPATIENT
Start: 2022-02-05 | End: 2022-02-06

## 2022-02-05 RX ORDER — ALPRAZOLAM 1 MG/1
1 TABLET ORAL ONCE
Status: COMPLETED | OUTPATIENT
Start: 2022-02-05 | End: 2022-02-06

## 2022-02-05 NOTE — TELEPHONE ENCOUNTER
Pt being discharged today - anemia/GI bleed related to anastomotic ulcer on upper GI tract.   - RN to have the pt follow up with Dr. Mayra Nolasco in clinic   - Dr. Geovanni Adame advised repeat EGD in 8 -12 weeks

## 2022-02-05 NOTE — PLAN OF CARE
No complaints of pain. MRI to be completed tomorrow     Problem: Patient/Family Goals  Goal: Patient/Family Short Term Goal  Description: Patient's Short Term Goal: Feel better    Interventions:   - Monitor vital signs  - Monitor appropriate labs  - Monitor blood glucose levels  - Pain management  - Administer medications per order  - Follow MD orders  - Diagnostics per order  - Update / inform patient and family on plan of care  - See additional Care Plan goals for specific interventions  Outcome: Progressing     Problem: PAIN - ADULT  Goal: Verbalizes/displays adequate comfort level or patient's stated pain goal  Description: INTERVENTIONS:  - Encourage pt to monitor pain and request assistance  - Assess pain using appropriate pain scale  - Administer analgesics based on type and severity of pain and evaluate response  - Implement non-pharmacological measures as appropriate and evaluate response  - Consider cultural and social influences on pain and pain management  - Manage/alleviate anxiety  - Utilize distraction and/or relaxation techniques  - Monitor for opioid side effects  - Notify MD/LIP if interventions unsuccessful or patient reports new pain  - Anticipate increased pain with activity and pre-medicate as appropriate  Outcome: Progressing     Problem: SAFETY ADULT - FALL  Goal: Free from fall injury  Description: INTERVENTIONS:  - Assess pt frequently for physical needs  - Identify cognitive and physical deficits and behaviors that affect risk of falls.   - [place] fall precautions as indicated by assessment.  - Educate pt/family on patient safety including physical limitations  - Instruct pt to call for assistance with activity based on assessment  - Modify environment to reduce risk of injury  - Provide assistive devices as appropriate  - Consider OT/PT consult to assist with strengthening/mobility  - Encourage toileting schedule  Outcome: Progressing     Problem: DISCHARGE PLANNING  Goal: Discharge to home or other facility with appropriate resources  Description: INTERVENTIONS:  - Identify barriers to discharge w/pt and caregiver  - Include patient/family/discharge partner in discharge planning  - Arrange for needed discharge resources and transportation as appropriate  - Identify discharge learning needs (meds, wound care, etc)  - Arrange for interpreters to assist at discharge as needed  - Consider post-discharge preferences of patient/family/discharge partner  - Complete POLST form as appropriate  - Assess patient's ability to be responsible for managing their own health  - Refer to Case Management Department for coordinating discharge planning if the patient needs post-hospital services based on physician/LIP order or complex needs related to functional status, cognitive ability or social support system  Outcome: Progressing

## 2022-02-05 NOTE — PLAN OF CARE
Problem: Patient Centered Care  Goal: Patient preferences are identified and integrated in the patient's plan of care  Description: Interventions:  - What would you like us to know as we care for you?   - Provide timely, complete, and accurate information to patient/family  - Incorporate patient and family knowledge, values, beliefs, and cultural backgrounds into the planning and delivery of care  - Encourage patient/family to participate in care and decision-making at the level they choose  - Honor patient and family perspectives and choices  Outcome: Progressing     Problem: Diabetes/Glucose Control  Goal: Glucose maintained within prescribed range  Description: INTERVENTIONS:  - Monitor Blood Glucose as ordered  - Assess for signs and symptoms of hyperglycemia and hypoglycemia  - Administer ordered medications to maintain glucose within target range  - Assess barriers to adequate nutritional intake and initiate nutrition consult as needed  - Instruct patient on self management of diabetes  Outcome: Progressing     Problem: Patient/Family Goals  Goal: Patient/Family Long Term Goal  Description: Patient's Long Term Goal: Pain resolved! Interventions:  - Pain level assess using pain scale from 1 to 10.  - Patient will be medicated as needed for pain or discomfort. - See additional Care Plan goals for specific interventions  Outcome: Progressing  Goal: Patient/Family Short Term Goal  Description: Patient's Short Term Goal: Labs within normal range. Interventions:   - Labs and vital signs will be monitored. - See additional Care Plan goals for specific interventions  Outcome: Progressing    Patient is presently resting in the bed. Alert x 4. Patient tolerated clear liquids diet. No complaints of pain or discomfort. Vital signs taken and stable. Patient is self care and independent. GI on consult to see patient.

## 2022-02-05 NOTE — PLAN OF CARE
Problem: Patient Centered Care  Goal: Patient preferences are identified and integrated in the patient's plan of care  Description: Interventions:  - What would you like us to know as we care for you?  From home alone  - Provide timely, complete, and accurate information to patient/family  - Incorporate patient and family knowledge, values, beliefs, and cultural backgrounds into the planning and delivery of care  - Encourage patient/family to participate in care and decision-making at the level they choose  - Honor patient and family perspectives and choices  Outcome: Progressing     Problem: Diabetes/Glucose Control  Goal: Glucose maintained within prescribed range  Description: INTERVENTIONS:  - Monitor Blood Glucose as ordered  - Assess for signs and symptoms of hyperglycemia and hypoglycemia  - Administer ordered medications to maintain glucose within target range  - Assess barriers to adequate nutritional intake and initiate nutrition consult as needed  - Instruct patient on self management of diabetes  Outcome: Progressing     Problem: Patient/Family Goals  Goal: Patient/Family Long Term Goal  Description: Patient's Long Term Goal: Go back home    Interventions:  - Monitor vital signs  - Monitor appropriate labs  - Monitor blood glucose levels  - Pain management  - Administer medications per order  - Follow MD orders  - Diagnostics per order  - Update / inform patient and family on plan of care  - Discharge planning  - See additional Care Plan goals for specific interventions  Outcome: Progressing  Goal: Patient/Family Short Term Goal  Description: Patient's Short Term Goal: Feel better    Interventions:   - Monitor vital signs  - Monitor appropriate labs  - Monitor blood glucose levels  - Pain management  - Administer medications per order  - Follow MD orders  - Diagnostics per order  - Update / inform patient and family on plan of care  - See additional Care Plan goals for specific interventions  Outcome: Progressing     Problem: SKIN/TISSUE INTEGRITY - ADULT  Goal: Skin integrity remains intact  Description: INTERVENTIONS  - Assess and document risk factors for pressure ulcer development  - Assess and document skin integrity  - Monitor for areas of redness and/or skin breakdown  - Initiate interventions, skin care algorithm/standards of care as needed  Outcome: Progressing     Problem: PAIN - ADULT  Goal: Verbalizes/displays adequate comfort level or patient's stated pain goal  Description: INTERVENTIONS:  - Encourage pt to monitor pain and request assistance  - Assess pain using appropriate pain scale  - Administer analgesics based on type and severity of pain and evaluate response  - Implement non-pharmacological measures as appropriate and evaluate response  - Consider cultural and social influences on pain and pain management  - Manage/alleviate anxiety  - Utilize distraction and/or relaxation techniques  - Monitor for opioid side effects  - Notify MD/LIP if interventions unsuccessful or patient reports new pain  - Anticipate increased pain with activity and pre-medicate as appropriate  Outcome: Progressing     Problem: SAFETY ADULT - FALL  Goal: Free from fall injury  Description: INTERVENTIONS:  - Assess pt frequently for physical needs  - Identify cognitive and physical deficits and behaviors that affect risk of falls.   - [place] fall precautions as indicated by assessment.  - Educate pt/family on patient safety including physical limitations  - Instruct pt to call for assistance with activity based on assessment  - Modify environment to reduce risk of injury  - Provide assistive devices as appropriate  - Consider OT/PT consult to assist with strengthening/mobility  - Encourage toileting schedule  Outcome: Progressing     Problem: DISCHARGE PLANNING  Goal: Discharge to home or other facility with appropriate resources  Description: INTERVENTIONS:  - Identify barriers to discharge w/pt and caregiver  - Include patient/family/discharge partner in discharge planning  - Arrange for needed discharge resources and transportation as appropriate  - Identify discharge learning needs (meds, wound care, etc)  - Arrange for interpreters to assist at discharge as needed  - Consider post-discharge preferences of patient/family/discharge partner  - Complete POLST form as appropriate  - Assess patient's ability to be responsible for managing their own health  - Refer to Case Management Department for coordinating discharge planning if the patient needs post-hospital services based on physician/LIP order or complex needs related to functional status, cognitive ability or social support system  Outcome: Progressing      Monitoring vital signs- stable at this time. Monitoring blood glucose levels. No acute changes noted at this time. Safety and fall precautions maintained: I- bed awareness on and audible, bed locked in lowest position, call light within reach. Frequent rounding by nursing staff.

## 2022-02-06 ENCOUNTER — TELEPHONE (OUTPATIENT)
Dept: GASTROENTEROLOGY | Facility: CLINIC | Age: 72
End: 2022-02-06

## 2022-02-06 ENCOUNTER — APPOINTMENT (OUTPATIENT)
Dept: MRI IMAGING | Facility: HOSPITAL | Age: 72
DRG: 638 | End: 2022-02-06
Attending: INTERNAL MEDICINE
Payer: MEDICARE

## 2022-02-06 VITALS
RESPIRATION RATE: 16 BRPM | BODY MASS INDEX: 28.99 KG/M2 | DIASTOLIC BLOOD PRESSURE: 71 MMHG | HEART RATE: 72 BPM | HEIGHT: 60 IN | TEMPERATURE: 97 F | OXYGEN SATURATION: 99 % | WEIGHT: 147.69 LBS | SYSTOLIC BLOOD PRESSURE: 140 MMHG

## 2022-02-06 LAB
ALBUMIN SERPL-MCNC: 3.5 G/DL (ref 3.4–5)
ALBUMIN/GLOB SERPL: 1.3 {RATIO} (ref 1–2)
ALP LIVER SERPL-CCNC: 129 U/L
ALT SERPL-CCNC: 344 U/L
ANION GAP SERPL CALC-SCNC: 5 MMOL/L (ref 0–18)
AST SERPL-CCNC: 41 U/L (ref 15–37)
BASOPHILS # BLD AUTO: 0.04 X10(3) UL (ref 0–0.2)
BASOPHILS NFR BLD AUTO: 0.6 %
BILIRUB SERPL-MCNC: 0.4 MG/DL (ref 0.1–2)
BUN/CREAT SERPL: 21.9 (ref 10–20)
CALCIUM BLD-MCNC: 9.8 MG/DL (ref 8.5–10.1)
CHLORIDE SERPL-SCNC: 107 MMOL/L (ref 98–112)
CREAT BLD-MCNC: 0.64 MG/DL
DEPRECATED RDW RBC AUTO: 39.9 FL (ref 35.1–46.3)
EOSINOPHIL # BLD AUTO: 0.2 X10(3) UL (ref 0–0.7)
EOSINOPHIL NFR BLD AUTO: 3 %
ERYTHROCYTE [DISTWIDTH] IN BLOOD BY AUTOMATED COUNT: 12.2 % (ref 11–15)
GLOBULIN PLAS-MCNC: 2.7 G/DL (ref 2.8–4.4)
GLUCOSE BLD-MCNC: 171 MG/DL (ref 70–99)
GLUCOSE BLDC GLUCOMTR-MCNC: 148 MG/DL (ref 70–99)
HCT VFR BLD AUTO: 39.5 %
HGB BLD-MCNC: 12.8 G/DL
IMM GRANULOCYTES # BLD AUTO: 0.02 X10(3) UL (ref 0–1)
IMM GRANULOCYTES NFR BLD: 0.3 %
LYMPHOCYTES # BLD AUTO: 2.59 X10(3) UL (ref 1–4)
LYMPHOCYTES NFR BLD AUTO: 39 %
MAGNESIUM SERPL-MCNC: 1.6 MG/DL (ref 1.7–2.8)
MCH RBC QN AUTO: 29 PG (ref 26–34)
MCHC RBC AUTO-ENTMCNC: 32.4 G/DL (ref 31–37)
MCV RBC AUTO: 89.6 FL
MONOCYTES # BLD AUTO: 0.64 X10(3) UL (ref 0.1–1)
MONOCYTES NFR BLD AUTO: 9.6 %
NEUTROPHILS # BLD AUTO: 3.15 X10 (3) UL (ref 1.5–7.7)
NEUTROPHILS # BLD AUTO: 3.15 X10(3) UL (ref 1.5–7.7)
NEUTROPHILS NFR BLD AUTO: 47.5 %
OSMOLALITY SERPL CALC.SUM OF ELEC: 291 MOSM/KG (ref 275–295)
PLATELET # BLD AUTO: 211 10(3)UL (ref 150–450)
POTASSIUM SERPL-SCNC: 4.3 MMOL/L (ref 3.5–5.1)
PROT SERPL-MCNC: 6.2 G/DL (ref 6.4–8.2)
RBC # BLD AUTO: 4.41 X10(6)UL
SODIUM SERPL-SCNC: 138 MMOL/L (ref 136–145)
WBC # BLD AUTO: 6.6 X10(3) UL (ref 4–11)

## 2022-02-06 PROCEDURE — 99232 SBSQ HOSP IP/OBS MODERATE 35: CPT | Performed by: INTERNAL MEDICINE

## 2022-02-06 PROCEDURE — 74181 MRI ABDOMEN W/O CONTRAST: CPT | Performed by: INTERNAL MEDICINE

## 2022-02-06 PROCEDURE — 76376 3D RENDER W/INTRP POSTPROCES: CPT | Performed by: INTERNAL MEDICINE

## 2022-02-06 RX ORDER — SUMATRIPTAN 25 MG/1
25 TABLET, FILM COATED ORAL EVERY 6 HOURS PRN
Qty: 30 TABLET | Refills: 0 | Status: SHIPPED | OUTPATIENT
Start: 2022-02-06

## 2022-02-06 RX ORDER — PANTOPRAZOLE SODIUM 20 MG/1
20 TABLET, DELAYED RELEASE ORAL DAILY
Qty: 30 TABLET | Refills: 0 | Status: ON HOLD | OUTPATIENT
Start: 2022-02-06 | End: 2022-03-28

## 2022-02-06 NOTE — PLAN OF CARE
Problem: Patient Centered Care  Goal: Patient preferences are identified and integrated in the patient's plan of care  Description: Interventions:  - What would you like us to know as we care for you?  From home alone  - Provide timely, complete, and accurate information to patient/family  - Incorporate patient and family knowledge, values, beliefs, and cultural backgrounds into the planning and delivery of care  - Encourage patient/family to participate in care and decision-making at the level they choose  - Honor patient and family perspectives and choices  Outcome: Progressing     Problem: Diabetes/Glucose Control  Goal: Glucose maintained within prescribed range  Description: INTERVENTIONS:  - Monitor Blood Glucose as ordered  - Assess for signs and symptoms of hyperglycemia and hypoglycemia  - Administer ordered medications to maintain glucose within target range  - Assess barriers to adequate nutritional intake and initiate nutrition consult as needed  - Instruct patient on self management of diabetes  Outcome: Progressing     Problem: Patient/Family Goals  Goal: Patient/Family Long Term Goal  Description: Patient's Long Term Goal: Go back home    Interventions:  - Monitor vital signs  - Monitor appropriate labs  - Monitor blood glucose levels  - Pain management  - Administer medications per order  - Follow MD orders  - Diagnostics per order  - Update / inform patient and family on plan of care  - Discharge planning  - See additional Care Plan goals for specific interventions  Outcome: Progressing  Goal: Patient/Family Short Term Goal  Description: Patient's Short Term Goal: Feel better    Interventions:   - Monitor vital signs  - Monitor appropriate labs  - Monitor blood glucose levels  - Pain management  - Administer medications per order  - Follow MD orders  - Diagnostics per order  - Update / inform patient and family on plan of care  - See additional Care Plan goals for specific interventions  Outcome: Progressing     Problem: SKIN/TISSUE INTEGRITY - ADULT  Goal: Skin integrity remains intact  Description: INTERVENTIONS  - Assess and document risk factors for pressure ulcer development  - Assess and document skin integrity  - Monitor for areas of redness and/or skin breakdown  - Initiate interventions, skin care algorithm/standards of care as needed  Outcome: Progressing     Problem: PAIN - ADULT  Goal: Verbalizes/displays adequate comfort level or patient's stated pain goal  Description: INTERVENTIONS:  - Encourage pt to monitor pain and request assistance  - Assess pain using appropriate pain scale  - Administer analgesics based on type and severity of pain and evaluate response  - Implement non-pharmacological measures as appropriate and evaluate response  - Consider cultural and social influences on pain and pain management  - Manage/alleviate anxiety  - Utilize distraction and/or relaxation techniques  - Monitor for opioid side effects  - Notify MD/LIP if interventions unsuccessful or patient reports new pain  - Anticipate increased pain with activity and pre-medicate as appropriate  Outcome: Progressing     Problem: SAFETY ADULT - FALL  Goal: Free from fall injury  Description: INTERVENTIONS:  - Assess pt frequently for physical needs  - Identify cognitive and physical deficits and behaviors that affect risk of falls.   - [place] fall precautions as indicated by assessment.  - Educate pt/family on patient safety including physical limitations  - Instruct pt to call for assistance with activity based on assessment  - Modify environment to reduce risk of injury  - Provide assistive devices as appropriate  - Consider OT/PT consult to assist with strengthening/mobility  - Encourage toileting schedule  Outcome: Progressing     Problem: DISCHARGE PLANNING  Goal: Discharge to home or other facility with appropriate resources  Description: INTERVENTIONS:  - Identify barriers to discharge w/pt and caregiver  - Include patient/family/discharge partner in discharge planning  - Arrange for needed discharge resources and transportation as appropriate  - Identify discharge learning needs (meds, wound care, etc)  - Arrange for interpreters to assist at discharge as needed  - Consider post-discharge preferences of patient/family/discharge partner  - Complete POLST form as appropriate  - Assess patient's ability to be responsible for managing their own health  - Refer to Case Management Department for coordinating discharge planning if the patient needs post-hospital services based on physician/LIP order or complex needs related to functional status, cognitive ability or social support system  Outcome: Progressing     Monitoring vital signs- stable at this time. Monitoring blood glucose levels. No acute changes noted at this time. Safety and fall precautions maintained- I-bed awareness on, bed locked in lowest position, call light within reach. Frequent rounding by nursing staff.

## 2022-02-06 NOTE — TELEPHONE ENCOUNTER
RN to set up hospital follow up for pt - adm with abnormal LFTs  - Dr. Cecilio Whitt in 2 weeks  - repeat liver panel in 1 week ( may be set up on discharge, please check and order if not)

## 2022-02-06 NOTE — PLAN OF CARE
Problem: Patient Centered Care  Goal: Patient preferences are identified and integrated in the patient's plan of care  Description: Interventions:  - What would you like us to know as we care for you?  From home alone  - Provide timely, complete, and accurate information to patient/family  - Incorporate patient and family knowledge, values, beliefs, and cultural backgrounds into the planning and delivery of care  - Encourage patient/family to participate in care and decision-making at the level they choose  - Honor patient and family perspectives and choices  Outcome: Adequate for Discharge     Problem: Diabetes/Glucose Control  Goal: Glucose maintained within prescribed range  Description: INTERVENTIONS:  - Monitor Blood Glucose as ordered  - Assess for signs and symptoms of hyperglycemia and hypoglycemia  - Administer ordered medications to maintain glucose within target range  - Assess barriers to adequate nutritional intake and initiate nutrition consult as needed  - Instruct patient on self management of diabetes  Outcome: Adequate for Discharge     Problem: Patient/Family Goals  Goal: Patient/Family Long Term Goal  Description: Patient's Long Term Goal: Go back home    Interventions:  - Monitor vital signs  - Monitor appropriate labs  - Monitor blood glucose levels  - Pain management  - Administer medications per order  - Follow MD orders  - Diagnostics per order  - Update / inform patient and family on plan of care  - Discharge planning  - See additional Care Plan goals for specific interventions  Outcome: Adequate for Discharge  Goal: Patient/Family Short Term Goal  Description: Patient's Short Term Goal: Feel better    Interventions:   - Monitor vital signs  - Monitor appropriate labs  - Monitor blood glucose levels  - Pain management  - Administer medications per order  - Follow MD orders  - Diagnostics per order  - Update / inform patient and family on plan of care  - See additional Care Plan goals for specific interventions  Outcome: Adequate for Discharge     Problem: SKIN/TISSUE INTEGRITY - ADULT  Goal: Skin integrity remains intact  Description: INTERVENTIONS  - Assess and document risk factors for pressure ulcer development  - Assess and document skin integrity  - Monitor for areas of redness and/or skin breakdown  - Initiate interventions, skin care algorithm/standards of care as needed  Outcome: Adequate for Discharge     Problem: PAIN - ADULT  Goal: Verbalizes/displays adequate comfort level or patient's stated pain goal  Description: INTERVENTIONS:  - Encourage pt to monitor pain and request assistance  - Assess pain using appropriate pain scale  - Administer analgesics based on type and severity of pain and evaluate response  - Implement non-pharmacological measures as appropriate and evaluate response  - Consider cultural and social influences on pain and pain management  - Manage/alleviate anxiety  - Utilize distraction and/or relaxation techniques  - Monitor for opioid side effects  - Notify MD/LIP if interventions unsuccessful or patient reports new pain  - Anticipate increased pain with activity and pre-medicate as appropriate  Outcome: Adequate for Discharge     Problem: SAFETY ADULT - FALL  Goal: Free from fall injury  Description: INTERVENTIONS:  - Assess pt frequently for physical needs  - Identify cognitive and physical deficits and behaviors that affect risk of falls.   - [place] fall precautions as indicated by assessment.  - Educate pt/family on patient safety including physical limitations  - Instruct pt to call for assistance with activity based on assessment  - Modify environment to reduce risk of injury  - Provide assistive devices as appropriate  - Consider OT/PT consult to assist with strengthening/mobility  - Encourage toileting schedule  Outcome: Adequate for Discharge     Problem: DISCHARGE PLANNING  Goal: Discharge to home or other facility with appropriate resources  Description: INTERVENTIONS:  - Identify barriers to discharge w/pt and caregiver  - Include patient/family/discharge partner in discharge planning  - Arrange for needed discharge resources and transportation as appropriate  - Identify discharge learning needs (meds, wound care, etc)  - Arrange for interpreters to assist at discharge as needed  - Consider post-discharge preferences of patient/family/discharge partner  - Complete POLST form as appropriate  - Assess patient's ability to be responsible for managing their own health  - Refer to Case Management Department for coordinating discharge planning if the patient needs post-hospital services based on physician/LIP order or complex needs related to functional status, cognitive ability or social support system  Outcome: Adequate for Discharge

## 2022-02-07 NOTE — TELEPHONE ENCOUNTER
Citlalli Negron MD 18 hours ago (1:04 PM)        RN to set up hospital follow up for pt - adm with abnormal LFTs  - Dr. Cecilio Whitt in 2 weeks  - repeat liver panel in 1 week ( may be set up on discharge, please check and order if not)

## 2022-02-07 NOTE — TELEPHONE ENCOUNTER
Dr Carter Williamson,    I spoke to the pt and we scheduled a f/u appt. Date time & [facility] location verified. Your Appointments    Monday February 28, 2022 10:00 AM  Follow Up Visit with Butch Hartman MD  [facility], [facility], [address] [place] [address] ([address] [place] [address]) [address]  [phone]           Do you want to order liver enzymes for next week?

## 2022-02-07 NOTE — TELEPHONE ENCOUNTER
Duplicate encounter. Please see encounter date 2/5/2022. Patient has seen Dr. Joon Palencia in the past and to follow up with him in 2 weeks per alternate encounter.

## 2022-02-08 LAB
MITOCHONDRIA AB TITR SER: <20 {TITER}
SMOOTH MUSCLE AB TITR SER: <20 {TITER}

## 2022-02-08 NOTE — TELEPHONE ENCOUNTER
Thanks all. Patient well-known to me. Follow-up appointment 2/28/2020 would be great. I am placing a standing order that so that she can come for LFTs day before or day of the appointment. Hospitalized 2/4/2022 with history of recent right upper quadrant pain, labs 2/3/2022 showing AST 1630 ALT 1162 total bilirubin 0.89. LFTs down trended to AST 41  on 2/6/2022. Work-up for acute severe hepatitis came back negative. Apparently feeling better so discharged home for outpatient follow-up. Unremarkable CT abdomen and pelvis 2/4/2022 showing hepatic steatosis, postcholecystectomy, 24 mm cystic lesion tail of pancreas. Noncontrast MRI MRCP 2/6/2022 shows hepatic steatosis, postcholecystectomy, normal biliary tree 6 mm CBD. No choledocholithiasis. Cystic pancreatic lesion tail of pancreas measured at 20 mm. Normal pancreatic duct. Possible pancreas divisum.

## 2022-02-08 NOTE — TELEPHONE ENCOUNTER
I spoke to the pt and she will go to the [facility] lab @ [address] on the Saturday before her appt with Dr Cyn Barragan for her [address]

## 2022-02-26 ENCOUNTER — LAB ENCOUNTER (OUTPATIENT)
Dept: LAB | Facility: HOSPITAL | Age: 72
End: 2022-02-26
Attending: INTERNAL MEDICINE
Payer: MEDICARE

## 2022-02-26 DIAGNOSIS — R79.89 ABNORMAL LFTS (LIVER FUNCTION TESTS): ICD-10-CM

## 2022-02-26 DIAGNOSIS — R74.8 ABNORMAL TRANSAMINASES: ICD-10-CM

## 2022-02-26 LAB
ALBUMIN SERPL-MCNC: 3.8 G/DL (ref 3.4–5)
ALBUMIN/GLOB SERPL: 1.2 {RATIO} (ref 1–2)
ALP LIVER SERPL-CCNC: 91 U/L
ANION GAP SERPL CALC-SCNC: 7 MMOL/L (ref 0–18)
AST SERPL-CCNC: 10 U/L (ref 15–37)
BILIRUB SERPL-MCNC: 0.5 MG/DL (ref 0.1–2)
BUN BLD-MCNC: 25 MG/DL (ref 7–18)
BUN/CREAT SERPL: 32.9 (ref 10–20)
CALCIUM BLD-MCNC: 9.1 MG/DL (ref 8.5–10.1)
CHLORIDE SERPL-SCNC: 109 MMOL/L (ref 98–112)
CO2 SERPL-SCNC: 23 MMOL/L (ref 21–32)
CREAT BLD-MCNC: 0.76 MG/DL
FASTING STATUS PATIENT QL REPORTED: YES
GLOBULIN PLAS-MCNC: 3.1 G/DL (ref 2.8–4.4)
GLUCOSE BLD-MCNC: 116 MG/DL (ref 70–99)
OSMOLALITY SERPL CALC.SUM OF ELEC: 293 MOSM/KG (ref 275–295)
POTASSIUM SERPL-SCNC: 3.8 MMOL/L (ref 3.5–5.1)
PROT SERPL-MCNC: 6.9 G/DL (ref 6.4–8.2)
SODIUM SERPL-SCNC: 139 MMOL/L (ref 136–145)

## 2022-02-26 PROCEDURE — 36415 COLL VENOUS BLD VENIPUNCTURE: CPT

## 2022-02-26 PROCEDURE — 80053 COMPREHEN METABOLIC PANEL: CPT

## 2022-02-28 ENCOUNTER — OFFICE VISIT (OUTPATIENT)
Dept: GASTROENTEROLOGY | Facility: CLINIC | Age: 72
End: 2022-02-28
Payer: MEDICARE

## 2022-02-28 ENCOUNTER — TELEPHONE (OUTPATIENT)
Dept: GASTROENTEROLOGY | Facility: CLINIC | Age: 72
End: 2022-02-28

## 2022-02-28 VITALS
SYSTOLIC BLOOD PRESSURE: 148 MMHG | HEIGHT: 64 IN | HEART RATE: 69 BPM | WEIGHT: 145 LBS | BODY MASS INDEX: 24.75 KG/M2 | DIASTOLIC BLOOD PRESSURE: 81 MMHG

## 2022-02-28 DIAGNOSIS — K80.50 RECURRENT BILIARY COLIC: ICD-10-CM

## 2022-02-28 DIAGNOSIS — R74.01 TRANSAMINITIS: ICD-10-CM

## 2022-02-28 DIAGNOSIS — R10.11 ABDOMINAL PAIN, RIGHT UPPER QUADRANT: ICD-10-CM

## 2022-02-28 DIAGNOSIS — K86.2 PANCREAS CYST: ICD-10-CM

## 2022-02-28 DIAGNOSIS — R79.89 ABNORMAL LFTS (LIVER FUNCTION TESTS): Primary | ICD-10-CM

## 2022-02-28 DIAGNOSIS — K58.0 IRRITABLE BOWEL SYNDROME WITH DIARRHEA: ICD-10-CM

## 2022-02-28 PROCEDURE — 99214 OFFICE O/P EST MOD 30 MIN: CPT | Performed by: INTERNAL MEDICINE

## 2022-02-28 RX ORDER — DILTIAZEM HYDROCHLORIDE 240 MG/1
240 CAPSULE, COATED, EXTENDED RELEASE ORAL EVERY MORNING
COMMUNITY
Start: 2022-02-09

## 2022-02-28 NOTE — TELEPHONE ENCOUNTER
Andra Myles, I saw this ela lady in follow-up today after an episode of possible biliary colic and abnormal LFTs. She was hospitalized. They are calling this pravastatin toxicity but may have been biliary colic. She has a very distant history of emergency cholecystectomy about 30 years ago, doesn't know why. CT and MRI MRCP during her admission this month 2/6/2022 showed a known pancreatic cystic lesion and known pancreas divisum. Pancreatic cystic lesion is known, probably a bit bigger from 16-17 mm in 2017. My office visit note of 2/28/2022 includes copy of an EUS exam with aspiration performed almost 5 years ago July 2017. She has been suffering what sounds like colicky right upper quadrant pain past 3 months, doubt pancreatic origin and recently presented with an impressive spike in LFTs along with this pain on 2/3/2022. Sounds like biliary colic? I asked her to schedule EUS with you both to reassess the pancreatic cystic lesion and if possible scan her biliary tree for choledocholithiasis.     - jazlyn

## 2022-02-28 NOTE — TELEPHONE ENCOUNTER
Scheduled for:  EGD 87614/EUS 76658 w poss FNA  Provider Name:  Dr Fly Cheema  Date:  04/25/2022  Location:  [facility]  Sedation:  MAC  Time:  0830 (pt is aware to arrive at 0730)  Prep:  NPO after midnight  Meds/Allergies Reconciled?:  Physician reviewed  Diagnosis with codes:  Abnormal LFT's R79.89; Recurrent biliary colic W33.50  Was patient informed to call insurance with codes (Y/N):  Y     Referral sent?:  NA  [facility] or [facility] [address] [place] [address] notified?:  I sent an electronic request to Endo Scheduling and received a confirmation today. Medication Orders:  Pt is aware to NOT take iron pills, herbal meds and diet supplements for 7 days before exam. Also to NOT take any form of alcohol, recreational drugs and any forms of ED meds 24 hours before exam.     Misc Orders:       Further instructions given by staff: I discussed the prep intructions with the patient in office which she verbally understood. Copy of instructions was handed to patient as well. Patient was also advised he will receive a call from PAT nurse 72-24 hours prior procedure to schedule Covid test done.

## 2022-03-01 NOTE — TELEPHONE ENCOUNTER
Sounds good. Can schedule patient for EGD/EUS with possible FNA for with MAC at the hospital for RUQ pain, abnormal MRCP, pancreas cyst.    Hold metformin day of procedure.     Thanks    Mellisa Hair MD  [facility], [facility] - Gastroenterology  3/1/2022  8:46 AM

## 2022-03-08 ENCOUNTER — TELEPHONE (OUTPATIENT)
Dept: OTOLARYNGOLOGY | Facility: CLINIC | Age: 72
End: 2022-03-08

## 2022-03-08 ENCOUNTER — OFFICE VISIT (OUTPATIENT)
Dept: OTOLARYNGOLOGY | Facility: CLINIC | Age: 72
End: 2022-03-08
Payer: MEDICARE

## 2022-03-08 VITALS — HEIGHT: 64 IN | WEIGHT: 145 LBS | BODY MASS INDEX: 24.75 KG/M2

## 2022-03-08 DIAGNOSIS — L43.8 EROSIVE ORAL LICHEN PLANUS: ICD-10-CM

## 2022-03-08 DIAGNOSIS — R04.0 EPISTAXIS: Primary | ICD-10-CM

## 2022-03-08 PROCEDURE — 99213 OFFICE O/P EST LOW 20 MIN: CPT | Performed by: SPECIALIST

## 2022-03-08 PROCEDURE — 30901 CONTROL OF NOSEBLEED: CPT | Performed by: SPECIALIST

## 2022-03-08 RX ORDER — PIMECROLIMUS 10 MG/G
1 CREAM TOPICAL 2 TIMES DAILY
Qty: 30 G | Refills: 2 | Status: SHIPPED | OUTPATIENT
Start: 2022-03-08 | End: 2022-03-15

## 2022-03-08 NOTE — PATIENT INSTRUCTIONS
Your right nares was cauterized. No nose blowing for 1 week's time. I placed you on a trial of 1% pimecrolimus cream.  This can be used twice daily as needed only to the areas that are eroded. Follow-up if there are further issues.

## 2022-03-12 ENCOUNTER — TELEPHONE (OUTPATIENT)
Dept: OTOLARYNGOLOGY | Facility: CLINIC | Age: 72
End: 2022-03-12

## 2022-03-17 ENCOUNTER — TELEPHONE (OUTPATIENT)
Dept: OTOLARYNGOLOGY | Facility: CLINIC | Age: 72
End: 2022-03-17

## 2022-03-17 RX ORDER — CLOBETASOL PROPIONATE 0.05 MG/G
1 GEL TOPICAL 2 TIMES DAILY
Qty: 30 G | Refills: 2 | Status: SHIPPED | OUTPATIENT
Start: 2022-03-17 | End: 2022-03-24

## 2022-03-19 ENCOUNTER — LAB ENCOUNTER (OUTPATIENT)
Dept: LAB | Facility: HOSPITAL | Age: 72
End: 2022-03-19
Attending: INTERNAL MEDICINE
Payer: MEDICARE

## 2022-03-19 DIAGNOSIS — R79.89 ABNORMAL LFTS (LIVER FUNCTION TESTS): ICD-10-CM

## 2022-03-19 DIAGNOSIS — R74.8 ABNORMAL TRANSAMINASES: ICD-10-CM

## 2022-03-19 DIAGNOSIS — K80.50 RECURRENT BILIARY COLIC: ICD-10-CM

## 2022-03-19 LAB
ALBUMIN SERPL-MCNC: 4.1 G/DL (ref 3.4–5)
ALBUMIN/GLOB SERPL: 1.3 {RATIO} (ref 1–2)
ALP LIVER SERPL-CCNC: 85 U/L
ALT SERPL-CCNC: 23 U/L
ANION GAP SERPL CALC-SCNC: 11 MMOL/L (ref 0–18)
AST SERPL-CCNC: 12 U/L (ref 15–37)
BILIRUB SERPL-MCNC: 0.7 MG/DL (ref 0.1–2)
BUN BLD-MCNC: 21 MG/DL (ref 7–18)
BUN/CREAT SERPL: 28 (ref 10–20)
CALCIUM BLD-MCNC: 10.2 MG/DL (ref 8.5–10.1)
CHLORIDE SERPL-SCNC: 107 MMOL/L (ref 98–112)
CO2 SERPL-SCNC: 23 MMOL/L (ref 21–32)
CREAT BLD-MCNC: 0.75 MG/DL
FASTING STATUS PATIENT QL REPORTED: YES
GLOBULIN PLAS-MCNC: 3.2 G/DL (ref 2.8–4.4)
GLUCOSE BLD-MCNC: 140 MG/DL (ref 70–99)
LIPASE SERPL-CCNC: 159 U/L (ref 73–393)
OSMOLALITY SERPL CALC.SUM OF ELEC: 297 MOSM/KG (ref 275–295)
POTASSIUM SERPL-SCNC: 4.4 MMOL/L (ref 3.5–5.1)
PROT SERPL-MCNC: 7.3 G/DL (ref 6.4–8.2)
SODIUM SERPL-SCNC: 141 MMOL/L (ref 136–145)

## 2022-03-19 PROCEDURE — 36415 COLL VENOUS BLD VENIPUNCTURE: CPT

## 2022-03-19 PROCEDURE — 83690 ASSAY OF LIPASE: CPT

## 2022-03-19 PROCEDURE — 80053 COMPREHEN METABOLIC PANEL: CPT

## 2022-03-20 ENCOUNTER — PATIENT MESSAGE (OUTPATIENT)
Dept: GASTROENTEROLOGY | Facility: CLINIC | Age: 72
End: 2022-03-20

## 2022-03-21 ENCOUNTER — TELEPHONE (OUTPATIENT)
Dept: GASTROENTEROLOGY | Facility: CLINIC | Age: 72
End: 2022-03-21

## 2022-03-21 ENCOUNTER — TELEPHONE (OUTPATIENT)
Dept: OTOLARYNGOLOGY | Facility: CLINIC | Age: 72
End: 2022-03-21

## 2022-03-21 RX ORDER — CLOBETASOL PROPIONATE 0.5 MG/G
1 CREAM TOPICAL 2 TIMES DAILY
Qty: 30 G | Refills: 3 | Status: SHIPPED | OUTPATIENT
Start: 2022-03-21 | End: 2022-03-21

## 2022-03-21 RX ORDER — CLOBETASOL PROPIONATE 0.5 MG/G
1 CREAM TOPICAL 2 TIMES DAILY
Qty: 30 G | Refills: 3 | Status: SHIPPED | OUTPATIENT
Start: 2022-03-21 | End: 2022-03-22

## 2022-03-21 NOTE — TELEPHONE ENCOUNTER
Dr. Laura Croft--    This patient would like to reschedule her procedure to a sooner date. I spoke with [facility] which she stated that I can r/s her on 4/11/22 starting at 0730, she did okay a 45 min procedure.  Please advise, thank you

## 2022-03-21 NOTE — TELEPHONE ENCOUNTER
clobetasol 0.05 % External Cream Apply 1 Application topically 2 (two) times daily.    Number of times this order has been changed since signin

## 2022-03-22 RX ORDER — CLOBETASOL PROPIONATE 0.5 MG/G
1 CREAM TOPICAL 2 TIMES DAILY
Qty: 30 G | Refills: 3 | Status: SHIPPED | OUTPATIENT
Start: 2022-03-22

## 2022-03-22 NOTE — TELEPHONE ENCOUNTER
PPD PA Dept-    This patient r/s her procedure to a sooner date. It is now scheduled on 3/28/22, a Stat referral was entered in [address] [place] [address] Rd.  Thank you    You may close this TE when done :)

## 2022-03-22 NOTE — TELEPHONE ENCOUNTER
Rescheduled for:  EGD 94989 and EUS w/poss FNA [id number]  Provider Name:  Dr. Theodore Modi  Date:    From-4/25/22 To-3/28/22  Location:    [address] [place] [address]  Sedation:  MAC  Time:    From-0830 [id number] (pt is aware to arrive at 0845)   Prep:  NPO after midnight, sent via [facility] [address] on 3/22/22  Meds/Allergies Reconciled?:  Physician reviewed   Diagnosis with codes:  Abnormal LFT's R79.89, Recurrent biliary colic F10.32  Was patient informed to call insurance with codes (Y/N):  Yes, I confirmed [facility] insurance with the patient. Referral sent?:  I sent a revised referral was sent at the time of electronic surgical scheduling. [address] [place] [address] or [address] notified?:  I sent a revised electronic request to Endo Scheduling and received a confirmation today. Medication Orders: This patient verbally confirmed that she is not taking:   Iron, blood thinners and IS diabetic   Not latex allergy, Not PCN allergy and does not have a pacemaker   This patient is aware to HOLD her BP meds (Telmisartan--PM) the night before the procedure     This patient is aware to HOLD her Metformin the day of the procedure    This patient is aware to HOLD all supplements x 7 days before the procedure. Misc Orders:       Further instructions given by staff:    This patient had no questions regarding the prep instructions

## 2022-03-23 ENCOUNTER — TELEPHONE (OUTPATIENT)
Dept: OTOLARYNGOLOGY | Facility: CLINIC | Age: 72
End: 2022-03-23

## 2022-03-23 NOTE — TELEPHONE ENCOUNTER
.meds  Cover my meds prior authorization  Login to go[employer]/login and click enter key  KEY [id number]  PATIENT LAST NAME- ALMAZ SRIVASTAVAB- 1950  Complete the PA and click send to plan for approval  Please notify the pharmacy  When you receive a determination. Renan Odonnell

## 2022-03-25 ENCOUNTER — TELEPHONE (OUTPATIENT)
Dept: OTOLARYNGOLOGY | Facility: CLINIC | Age: 72
End: 2022-03-25

## 2022-03-25 ENCOUNTER — LAB ENCOUNTER (OUTPATIENT)
Dept: LAB | Facility: HOSPITAL | Age: 72
End: 2022-03-25
Attending: INTERNAL MEDICINE
Payer: MEDICARE

## 2022-03-25 DIAGNOSIS — Z01.818 PRE-OP TESTING: ICD-10-CM

## 2022-03-25 LAB — SARS-COV-2 RNA RESP QL NAA+PROBE: NOT DETECTED

## 2022-03-25 NOTE — TELEPHONE ENCOUNTER
Prior  auth for med   PIMECROLIMUS 1% CREAM  Login to go.[employer]/login and click enter key  Enter patients last name, and key provided  Key- [id number]  Patient last name ALMAZ  - 7/3/1950  Complete PA and click send to plan for approval   Please notify pharmacy when you receive notification. Put in nurses blue file.

## 2022-03-28 ENCOUNTER — ANESTHESIA (OUTPATIENT)
Dept: ENDOSCOPY | Facility: HOSPITAL | Age: 72
End: 2022-03-28
Payer: MEDICARE

## 2022-03-28 ENCOUNTER — HOSPITAL ENCOUNTER (OUTPATIENT)
Facility: HOSPITAL | Age: 72
Setting detail: HOSPITAL OUTPATIENT SURGERY
Discharge: HOME | End: 2022-03-28
Attending: INTERNAL MEDICINE | Admitting: INTERNAL MEDICINE
Payer: MEDICARE

## 2022-03-28 ENCOUNTER — TELEPHONE (OUTPATIENT)
Dept: GASTROENTEROLOGY | Facility: CLINIC | Age: 72
End: 2022-03-28

## 2022-03-28 ENCOUNTER — ANESTHESIA EVENT (OUTPATIENT)
Dept: ENDOSCOPY | Facility: HOSPITAL | Age: 72
End: 2022-03-28
Payer: MEDICARE

## 2022-03-28 VITALS
BODY MASS INDEX: 28.27 KG/M2 | TEMPERATURE: 98 F | HEIGHT: 60 IN | DIASTOLIC BLOOD PRESSURE: 57 MMHG | HEART RATE: 64 BPM | WEIGHT: 144 LBS | OXYGEN SATURATION: 98 % | SYSTOLIC BLOOD PRESSURE: 127 MMHG | RESPIRATION RATE: 16 BRPM

## 2022-03-28 DIAGNOSIS — K80.50 RECURRENT BILIARY COLIC: ICD-10-CM

## 2022-03-28 DIAGNOSIS — R79.89 ABNORMAL LIVER FUNCTION TESTS: ICD-10-CM

## 2022-03-28 DIAGNOSIS — Z01.818 PRE-OP TESTING: Primary | ICD-10-CM

## 2022-03-28 LAB
AMYLASE FLD-CCNC: <5 U/L
CEA FLD-MCNC: 12.4 NG/ML
GLUCOSE BLDC GLUCOMTR-MCNC: 144 MG/DL (ref 70–99)
GLUCOSE FLD-MCNC: <1 MG/DL

## 2022-03-28 PROCEDURE — 43239 EGD BIOPSY SINGLE/MULTIPLE: CPT | Performed by: INTERNAL MEDICINE

## 2022-03-28 PROCEDURE — 43238 EGD US FINE NEEDLE BX/ASPIR: CPT | Performed by: INTERNAL MEDICINE

## 2022-03-28 PROCEDURE — 0DB78ZX EXCISION OF STOMACH, PYLORUS, VIA NATURAL OR ARTIFICIAL OPENING ENDOSCOPIC, DIAGNOSTIC: ICD-10-PCS | Performed by: INTERNAL MEDICINE

## 2022-03-28 PROCEDURE — 0DB38ZX EXCISION OF LOWER ESOPHAGUS, VIA NATURAL OR ARTIFICIAL OPENING ENDOSCOPIC, DIAGNOSTIC: ICD-10-PCS | Performed by: INTERNAL MEDICINE

## 2022-03-28 PROCEDURE — BF47ZZZ ULTRASONOGRAPHY OF PANCREAS: ICD-10-PCS | Performed by: INTERNAL MEDICINE

## 2022-03-28 PROCEDURE — 0FDG8ZX EXTRACTION OF PANCREAS, VIA NATURAL OR ARTIFICIAL OPENING ENDOSCOPIC, DIAGNOSTIC: ICD-10-PCS | Performed by: INTERNAL MEDICINE

## 2022-03-28 RX ORDER — LEVOFLOXACIN 5 MG/ML
INJECTION, SOLUTION INTRAVENOUS AS NEEDED
Status: DISCONTINUED | OUTPATIENT
Start: 2022-03-28 | End: 2022-03-28 | Stop reason: SURG

## 2022-03-28 RX ORDER — LIDOCAINE HYDROCHLORIDE 10 MG/ML
INJECTION, SOLUTION EPIDURAL; INFILTRATION; INTRACAUDAL; PERINEURAL AS NEEDED
Status: DISCONTINUED | OUTPATIENT
Start: 2022-03-28 | End: 2022-03-28 | Stop reason: SURG

## 2022-03-28 RX ORDER — SODIUM CHLORIDE, SODIUM LACTATE, POTASSIUM CHLORIDE, CALCIUM CHLORIDE 600; 310; 30; 20 MG/100ML; MG/100ML; MG/100ML; MG/100ML
INJECTION, SOLUTION INTRAVENOUS CONTINUOUS
Status: DISCONTINUED | OUTPATIENT
Start: 2022-03-28 | End: 2022-03-28

## 2022-03-28 RX ORDER — LEVOFLOXACIN 500 MG/1
500 TABLET, FILM COATED ORAL EVERY 24 HOURS
Qty: 3 TABLET | Refills: 0 | Status: SHIPPED | OUTPATIENT
Start: 2022-03-29 | End: 2022-04-01

## 2022-03-28 RX ORDER — PANTOPRAZOLE SODIUM 40 MG/1
40 TABLET, DELAYED RELEASE ORAL
Qty: 90 TABLET | Refills: 0 | Status: SHIPPED | OUTPATIENT
Start: 2022-03-28 | End: 2022-06-26

## 2022-03-28 RX ADMIN — LIDOCAINE HYDROCHLORIDE 50 MG: 10 INJECTION, SOLUTION EPIDURAL; INFILTRATION; INTRACAUDAL; PERINEURAL at 10:00:00

## 2022-03-28 RX ADMIN — LEVOFLOXACIN 500 MG: 5 INJECTION, SOLUTION INTRAVENOUS at 10:25:00

## 2022-03-28 RX ADMIN — SODIUM CHLORIDE, SODIUM LACTATE, POTASSIUM CHLORIDE, CALCIUM CHLORIDE: 600; 310; 30; 20 INJECTION, SOLUTION INTRAVENOUS at 09:56:00

## 2022-03-28 NOTE — ANESTHESIA POSTPROCEDURE EVALUATION
Patient: Madonna Machado    Procedure Summary     Date: 03/28/22 Room / Location: [address] [place] [address] ENDOSCOPY 01 / [address] [place] [address] ENDOSCOPY    Anesthesia Start: 2932 Anesthesia Stop: 2278    Procedures:       ESOPHAGOGASTRODUODENOSCOPY (EGD)/ENDOSCOPIC ULTRASOUND (EUS) with possible fine needle aspiration (N/A )      Endoscopic ultrasound with fine needle aspirate (N/A ) Diagnosis:       Abnormal liver function tests      Recurrent biliary colic      (Panceatic cyst esophagitis)    Surgeons: Pankaj Herron MD Anesthesiologist: Joon Palencia CRNA    Anesthesia Type: general ASA Status: 2          Anesthesia Type: general    Vitals Value Taken Time   /67 03/28/22 1037   Temp 98.6 03/28/22 1037   Pulse 77 03/28/22 1037   Resp 17 03/28/22 1037   SpO2 96 % 03/28/22 1037   Vitals shown include unvalidated device data.     [address] [place] [address] AN Post Evaluation:   Patient Evaluated in PACU  Patient Participation: complete - patient participated  Level of Consciousness: sleepy but conscious  Pain Score: 0  Pain Management: adequate  Airway Patency:patent  Dental exam unchanged from preop  Yes    Cardiovascular Status: acceptable  Respiratory Status: acceptable and room air  Postoperative Hydration acceptable      Nathaly Espinosa CRNA  3/28/2022 10:37 AM

## 2022-03-28 NOTE — TELEPHONE ENCOUNTER
Discussed procedure findings with patient and daughter separately over the phone. Notes taking pantoprazole 20 mg but not every day. Discussed esophagitis noted and recommendation to increase to 40 mg and take daily. Will send new prescription. Also discussed the pancreas cyst and FNA. Plan for levofloxacin for 3 additional days. Discussed signs/symptoms to monitor for post EUS/FNA. Aware to call and make a follow up with Dr. Celestine Gaming.      Appreciative of care    Garret Doe MD  [address] [place] - Gastroenterology  3/28/2022  10:58 AM

## 2022-03-30 ENCOUNTER — TELEPHONE (OUTPATIENT)
Dept: GASTROENTEROLOGY | Facility: CLINIC | Age: 72
End: 2022-03-30

## 2022-03-30 NOTE — TELEPHONE ENCOUNTER
Called patient to discuss path. No answer. Left voice message to call back.     Rakel Maher MD  [address] [place] - Gastroenterology  3/30/2022  3:16 PM

## 2022-03-31 NOTE — TELEPHONE ENCOUNTER
Called and spoke with patient. Says she is doing well. Discussed path/cyto/fluid studies. Discussed fluid studies from cyst are indeterminate which can often occur but could be still consistent with a pre-cancerous type cyst and would recommend continuing to monitor and recommend MRCP in 1 year. Appreciative of call and care.     GI staff:    Please let Dr. Carter Williamson know I would recommend recall for MRI/MRCP in 1 year    Thanks    Ann Cast MD  [facility], [facility] - Gastroenterology  3/31/2022  3:51 PM

## 2022-03-31 NOTE — TELEPHONE ENCOUNTER
Dr. Ralph Ghosh,    Please see Dr. Cooper Sales message below. MRI/MRCP recall placed into Pt Outreach. Next due 3/2023.

## 2022-04-04 NOTE — TELEPHONE ENCOUNTER
Dm Sparks and GI RNs,    Could you please call MsCheyenne Russ to find her a follow-up appointment with me next 2-8 weeks? Not urgent. Monday afternoons ok.

## 2022-04-05 NOTE — TELEPHONE ENCOUNTER
I spoke to the pt and she accepted a f/u appt in [facility]    Date, time and location verified    Future Appointments   Date Time Provider Katalina Lynn   5/13/2022 10:50 AM Stefano Hay, MD [facility]   5/27/2022  1:30 PM Cassidy Sharma MD [facility] ([facility])  [facility]

## 2022-04-23 ENCOUNTER — PATIENT MESSAGE (OUTPATIENT)
Dept: OTOLARYNGOLOGY | Facility: CLINIC | Age: 72
End: 2022-04-23

## 2022-04-25 NOTE — TELEPHONE ENCOUNTER
From: Lukas Tang  To: Ben White MD  Sent: 4/23/2022 12:12 PM CDT  Subject: Clobetasol Cream    Hi Dr. Susanna Guerrero I have used the Cream for 7 days, 2x day and my [place] has gotten better I have relief after many years! My question is, when it flares up again can I use the Cream and how often can it be used?   Thank you,  Trudy De La Vega

## 2022-05-23 ENCOUNTER — LAB ENCOUNTER (OUTPATIENT)
Dept: LAB | Facility: HOSPITAL | Age: 72
End: 2022-05-23
Attending: INTERNAL MEDICINE
Payer: MEDICARE

## 2022-05-23 DIAGNOSIS — R79.89 ABNORMAL LFTS (LIVER FUNCTION TESTS): ICD-10-CM

## 2022-05-23 DIAGNOSIS — K80.50 RECURRENT BILIARY COLIC: ICD-10-CM

## 2022-05-23 LAB
ALBUMIN SERPL-MCNC: 3.8 G/DL (ref 3.4–5)
ALBUMIN/GLOB SERPL: 1.1 {RATIO} (ref 1–2)
ALP LIVER SERPL-CCNC: 73 U/L
ALT SERPL-CCNC: 28 U/L
ANION GAP SERPL CALC-SCNC: 6 MMOL/L (ref 0–18)
AST SERPL-CCNC: 12 U/L (ref 15–37)
BILIRUB SERPL-MCNC: 0.4 MG/DL (ref 0.1–2)
BUN BLD-MCNC: 18 MG/DL (ref 7–18)
BUN/CREAT SERPL: 26.1 (ref 10–20)
CALCIUM BLD-MCNC: 9.3 MG/DL (ref 8.5–10.1)
CHLORIDE SERPL-SCNC: 110 MMOL/L (ref 98–112)
CO2 SERPL-SCNC: 25 MMOL/L (ref 21–32)
CREAT BLD-MCNC: 0.69 MG/DL
FASTING STATUS PATIENT QL REPORTED: YES
GLOBULIN PLAS-MCNC: 3.6 G/DL (ref 2.8–4.4)
GLUCOSE BLD-MCNC: 147 MG/DL (ref 70–99)
LIPASE SERPL-CCNC: 141 U/L (ref 73–393)
OSMOLALITY SERPL CALC.SUM OF ELEC: 297 MOSM/KG (ref 275–295)
POTASSIUM SERPL-SCNC: 3.9 MMOL/L (ref 3.5–5.1)
PROT SERPL-MCNC: 7.4 G/DL (ref 6.4–8.2)
SODIUM SERPL-SCNC: 141 MMOL/L (ref 136–145)

## 2022-05-23 PROCEDURE — 83690 ASSAY OF LIPASE: CPT

## 2022-05-27 ENCOUNTER — OFFICE VISIT (OUTPATIENT)
Dept: GASTROENTEROLOGY | Facility: CLINIC | Age: 72
End: 2022-05-27
Payer: MEDICARE

## 2022-05-27 ENCOUNTER — TELEPHONE (OUTPATIENT)
Dept: GASTROENTEROLOGY | Facility: CLINIC | Age: 72
End: 2022-05-27

## 2022-05-27 VITALS
HEIGHT: 60 IN | WEIGHT: 143.81 LBS | HEART RATE: 71 BPM | SYSTOLIC BLOOD PRESSURE: 129 MMHG | BODY MASS INDEX: 28.23 KG/M2 | DIASTOLIC BLOOD PRESSURE: 81 MMHG

## 2022-05-27 DIAGNOSIS — K58.0 IRRITABLE BOWEL SYNDROME WITH DIARRHEA: ICD-10-CM

## 2022-05-27 DIAGNOSIS — R10.11 ABDOMINAL PAIN, RIGHT UPPER QUADRANT: Primary | ICD-10-CM

## 2022-05-27 DIAGNOSIS — K86.9 PANCREATIC LESION: ICD-10-CM

## 2022-05-27 PROCEDURE — 99214 OFFICE O/P EST MOD 30 MIN: CPT | Performed by: INTERNAL MEDICINE

## 2022-05-27 RX ORDER — AMOXICILLIN 500 MG/1
2000 CAPSULE ORAL AS DIRECTED
COMMUNITY
Start: 2022-04-26

## 2022-05-27 RX ORDER — CEPHALEXIN 500 MG/1
500 CAPSULE ORAL EVERY 12 HOURS
COMMUNITY
Start: 2022-05-17

## 2022-05-27 RX ORDER — PANTOPRAZOLE SODIUM 40 MG/1
40 TABLET, DELAYED RELEASE ORAL
Qty: 90 TABLET | Refills: 3 | Status: SHIPPED | OUTPATIENT
Start: 2022-05-27 | End: 2022-08-25

## 2022-05-27 RX ORDER — TRAMADOL HYDROCHLORIDE 50 MG/1
50 TABLET ORAL EVERY 6 HOURS PRN
Qty: 30 TABLET | Refills: 2 | Status: SHIPPED | OUTPATIENT
Start: 2022-05-27

## 2022-05-27 RX ORDER — DIPHENOXYLATE HYDROCHLORIDE AND ATROPINE SULFATE 2.5; .025 MG/1; MG/1
1-2 TABLET ORAL 3 TIMES DAILY PRN
Qty: 30 TABLET | Refills: 5 | Status: SHIPPED | OUTPATIENT
Start: 2022-05-27

## 2022-05-27 NOTE — TELEPHONE ENCOUNTER
Dr. Luca Gross needs a diagnosis code for Tramadol. Would it be ok to use R10. 11 for right upper quadrant pain?     Thank you

## 2022-05-27 NOTE — TELEPHONE ENCOUNTER
[address] called in stating they need the pt diagnosis code in order to prescribe medicine tramadol 50 mg.  Please follow up

## 2022-05-29 NOTE — TELEPHONE ENCOUNTER
This is a new one. Must be [employer]'s response to the narcotic lawsuits. Yes, please use the RUQ pain code.

## 2022-05-31 NOTE — TELEPHONE ENCOUNTER
I called [address] and spoke to [facility] PCT    I gave her the dx of RUQ pain.  This is all she needed not the ICD-10

## 2022-09-06 ENCOUNTER — OFFICE VISIT (OUTPATIENT)
Dept: OTOLARYNGOLOGY | Facility: CLINIC | Age: 72
End: 2022-09-06
Payer: MEDICARE

## 2022-09-06 VITALS — HEIGHT: 60 IN | TEMPERATURE: 99 F | BODY MASS INDEX: 28.07 KG/M2 | WEIGHT: 143 LBS

## 2022-09-06 DIAGNOSIS — L43.8 EROSIVE ORAL LICHEN PLANUS: ICD-10-CM

## 2022-09-06 DIAGNOSIS — R04.0 EPISTAXIS: Primary | ICD-10-CM

## 2022-09-06 PROCEDURE — 99213 OFFICE O/P EST LOW 20 MIN: CPT | Performed by: SPECIALIST

## 2022-09-06 PROCEDURE — 30901 CONTROL OF NOSEBLEED: CPT | Performed by: SPECIALIST

## 2022-09-06 NOTE — PATIENT INSTRUCTIONS
Nose was fully cauterized on the right. No no swelling for 1 week's time. Follow-up with any additional questions or problems. Continue the pimecrolimus as needed for the lichen planus. Follow-up with any additional questions or problems.

## 2022-09-27 ENCOUNTER — PATIENT MESSAGE (OUTPATIENT)
Dept: OTOLARYNGOLOGY | Facility: CLINIC | Age: 72
End: 2022-09-27

## 2022-09-27 ENCOUNTER — TELEPHONE (OUTPATIENT)
Dept: OTOLARYNGOLOGY | Facility: CLINIC | Age: 72
End: 2022-09-27

## 2022-09-27 RX ORDER — IPRATROPIUM BROMIDE 42 UG/1
2 SPRAY, METERED NASAL 3 TIMES DAILY
Qty: 15 ML | Refills: 5 | Status: SHIPPED | OUTPATIENT
Start: 2022-09-27

## 2022-09-27 NOTE — TELEPHONE ENCOUNTER
Pt called stating at last appointment discussed a medication for post nasal drip. Pt tried over the counter medication. Did not work. Pt is asking for the rx. Send to [address] .    Please call

## 2022-09-27 NOTE — TELEPHONE ENCOUNTER
From: Alcide Cheadle  To: Vanessa Avendano MD  Sent: 9/27/2022 8:38 AM CDT  Subject: Barbara Fisher  I also left a message a phone message for you. When I was in several months ago , you send the camera down because of my post nasal drip and my constant clearing of my throat. You suggested a prescription that could help. I decided to try an over the counter drug. Can you prescribe the prescription drug for me? I think I should try it, because the other really is not working. My pharmacy is [facility] .   Thank you for your time,  Suresh Shen  [phone]

## 2022-09-27 NOTE — TELEPHONE ENCOUNTER
Last three office visit notes reviewed, no mention of medication for post nasal drip. Last seen 9/6/22 for epistaxis with cautery. Dr. Zeny Hargrove please advise on rx.

## 2022-10-05 NOTE — TELEPHONE ENCOUNTER
Please see follow up message from patient. She has been using ipratropium nasal spray for 1 week. No relief of symptoms yet, would you like her to give it more time?

## 2022-11-21 ENCOUNTER — PATIENT MESSAGE (OUTPATIENT)
Dept: GASTROENTEROLOGY | Facility: CLINIC | Age: 72
End: 2022-11-21

## 2022-11-21 DIAGNOSIS — R79.89 ABNORMAL LFTS: ICD-10-CM

## 2022-11-21 DIAGNOSIS — K86.9 PANCREATIC LESION: Primary | ICD-10-CM

## 2022-11-21 DIAGNOSIS — K80.50 RECURRENT BILIARY COLIC: ICD-10-CM

## 2022-11-22 NOTE — TELEPHONE ENCOUNTER
From: Donald Heredia  To: Madeleine Amos MD  Sent: 11/21/2022 4:27 PM CST  Subject: Liver Enzyme Blood Tests    Hi .  I have been having some pain in the upper right   quadrant again. I was at my internist today Nov. 21 and the pain was pretty bad, and she did some blood work and my AST is 276 and my ALT is 169. Not as bad as before, but Dr. Amanda Mcgarry thinks I should see you soon especially since the pain has returned. The pain is quite often now.   Thank you,  Valorie Wren

## 2022-11-22 NOTE — TELEPHONE ENCOUNTER
Dr Sloane Castro,    I spoke to [facility]    Please look in Levon in at most recent CMP    Pt is nauseated at times    Pain in RUQ of abdomen. The pain comes and goes. Pain level 3-4/10    Pain is an uncomfortable ache    Pt takes Tramadol for the pain    Pt advised the ER if symptoms worsen.  She voices understanding

## 2022-11-26 NOTE — TELEPHONE ENCOUNTER
Repeated spikes in LFTs are really pointing us toward biliary tree. Really the only common intervention at this point would be an ERCP which does have significant risks. Detailed email sent back to [place] today.     - jazlyn

## 2022-11-29 NOTE — TELEPHONE ENCOUNTER
Debora Baca, Norma Harvey MD Yesterday (7:28 AM)       After I sent you the email last evening I developed an Bladder infection I immediately started taking Cephalexin 500mg. I have these meds because I get these infections several times a year and my Urologist wants me to take them immediately at the first sign if infection. I will be on them for 4 days. I did not know if this would affect the the ERCP test you are scheduling.   Thank you,  Dennis Burrell

## 2022-11-30 ENCOUNTER — TELEPHONE (OUTPATIENT)
Facility: CLINIC | Age: 72
End: 2022-11-30

## 2022-11-30 DIAGNOSIS — R11.0 NAUSEA: ICD-10-CM

## 2022-11-30 DIAGNOSIS — R10.11 RUQ PAIN: Primary | ICD-10-CM

## 2022-11-30 DIAGNOSIS — R74.8 ABNORMAL LIVER ENZYMES: ICD-10-CM

## 2022-12-12 ENCOUNTER — LAB ENCOUNTER (OUTPATIENT)
Dept: LAB | Facility: HOSPITAL | Age: 72
End: 2022-12-12
Attending: INTERNAL MEDICINE
Payer: MEDICARE

## 2022-12-12 DIAGNOSIS — K80.50 RECURRENT BILIARY COLIC: ICD-10-CM

## 2022-12-12 DIAGNOSIS — R79.89 ABNORMAL LFTS: ICD-10-CM

## 2022-12-12 DIAGNOSIS — K86.9 PANCREATIC LESION: ICD-10-CM

## 2022-12-12 LAB
ALBUMIN SERPL-MCNC: 4.1 G/DL (ref 3.4–5)
ALBUMIN/GLOB SERPL: 1.2 {RATIO} (ref 1–2)
ALP LIVER SERPL-CCNC: 72 U/L
ALT SERPL-CCNC: 34 U/L
ANION GAP SERPL CALC-SCNC: 9 MMOL/L (ref 0–18)
AST SERPL-CCNC: 17 U/L (ref 15–37)
BILIRUB SERPL-MCNC: 0.6 MG/DL (ref 0.1–2)
BUN BLD-MCNC: 20 MG/DL (ref 7–18)
BUN/CREAT SERPL: 25.6 (ref 10–20)
CALCIUM BLD-MCNC: 10 MG/DL (ref 8.5–10.1)
CHLORIDE SERPL-SCNC: 104 MMOL/L (ref 98–112)
CO2 SERPL-SCNC: 26 MMOL/L (ref 21–32)
CREAT BLD-MCNC: 0.78 MG/DL
FASTING STATUS PATIENT QL REPORTED: YES
GFR SERPLBLD BASED ON 1.73 SQ M-ARVRAT: 80 ML/MIN/1.73M2 (ref 60–?)
GLOBULIN PLAS-MCNC: 3.4 G/DL (ref 2.8–4.4)
GLUCOSE BLD-MCNC: 158 MG/DL (ref 70–99)
LIPASE SERPL-CCNC: 140 U/L (ref 73–393)
OSMOLALITY SERPL CALC.SUM OF ELEC: 294 MOSM/KG (ref 275–295)
POTASSIUM SERPL-SCNC: 3.9 MMOL/L (ref 3.5–5.1)
PROT SERPL-MCNC: 7.5 G/DL (ref 6.4–8.2)
SODIUM SERPL-SCNC: 139 MMOL/L (ref 136–145)

## 2022-12-12 PROCEDURE — 80053 COMPREHEN METABOLIC PANEL: CPT

## 2022-12-12 PROCEDURE — 36415 COLL VENOUS BLD VENIPUNCTURE: CPT

## 2022-12-12 PROCEDURE — 83690 ASSAY OF LIPASE: CPT

## 2022-12-12 NOTE — TELEPHONE ENCOUNTER
Scheduled for:  ERCP - 87299  Provider Name:  Dr. Sushant Peraza  Date:  12/27/22  Location:  [facility]  Sedation:  GENERAL  Time:  12:30 pm (pt is aware to arrive at 11:30 am)  Prep:  NPO after midnight, Prep instructions were given to pt over the phone, pt verbalized understanding. Meds/Allergies Reconciled?:  Yes, Physician reviewed    Diagnosis with codes:  RUQ pain - R10.11, Nausea - R11.0, Abnormal liver enzymes - R74.8  Was patient informed to call insurance with codes (Y/N):  Yes, I confirmed [facility] insurance with this patient. Referral sent?:  Yes, Referral was sent at the time of electronic surgical scheduling. [facility] or [address] notified?:  Yes, I sent an electronic request to Endo Scheduling and received a confirmation today. Medication Orders:  Pt is to HOLD Telmisartan the evening before the procedure. Pt is to HOLD Metformin the day of the procedure. Misc Orders:  Patient was informed that they will need a COVID 19 test prior to their procedure. Patient verbally understood & will await a phone call from [facility] to schedule. Further instructions given by staff:  I discussed the prep instructions with the patient which she verbally understood and is aware that I will send the instructions via [employer].

## 2022-12-20 RX ORDER — PANTOPRAZOLE SODIUM 40 MG/1
40 TABLET, DELAYED RELEASE ORAL
COMMUNITY

## 2022-12-26 ENCOUNTER — LAB ENCOUNTER (OUTPATIENT)
Dept: LAB | Facility: HOSPITAL | Age: 72
End: 2022-12-26
Attending: INTERNAL MEDICINE
Payer: MEDICARE

## 2022-12-26 DIAGNOSIS — Z01.818 PRE-OP TESTING: ICD-10-CM

## 2022-12-26 LAB — SARS-COV-2 RNA RESP QL NAA+PROBE: NOT DETECTED

## 2022-12-27 ENCOUNTER — ANESTHESIA EVENT (OUTPATIENT)
Dept: ENDOSCOPY | Facility: HOSPITAL | Age: 72
End: 2022-12-27
Payer: MEDICARE

## 2022-12-27 ENCOUNTER — HOSPITAL ENCOUNTER (OUTPATIENT)
Facility: HOSPITAL | Age: 72
Discharge: HOME | End: 2022-12-28
Attending: INTERNAL MEDICINE | Admitting: INTERNAL MEDICINE
Payer: MEDICARE

## 2022-12-27 ENCOUNTER — ANESTHESIA (OUTPATIENT)
Dept: ENDOSCOPY | Facility: HOSPITAL | Age: 72
End: 2022-12-27
Payer: MEDICARE

## 2022-12-27 ENCOUNTER — APPOINTMENT (OUTPATIENT)
Dept: GENERAL RADIOLOGY | Facility: HOSPITAL | Age: 72
End: 2022-12-27
Attending: INTERNAL MEDICINE
Payer: MEDICARE

## 2022-12-27 DIAGNOSIS — Z01.818 PRE-OP TESTING: Primary | ICD-10-CM

## 2022-12-27 PROBLEM — G89.18 PAIN ASSOCIATED WITH SURGICAL PROCEDURE: Status: ACTIVE | Noted: 2022-12-27

## 2022-12-27 LAB
GLUCOSE BLDC GLUCOMTR-MCNC: 132 MG/DL (ref 70–99)
GLUCOSE BLDC GLUCOMTR-MCNC: 172 MG/DL (ref 70–99)
GLUCOSE BLDC GLUCOMTR-MCNC: 185 MG/DL (ref 70–99)
GLUCOSE BLDC GLUCOMTR-MCNC: 195 MG/DL (ref 70–99)

## 2022-12-27 PROCEDURE — 99203 OFFICE O/P NEW LOW 30 MIN: CPT | Performed by: INTERNAL MEDICINE

## 2022-12-27 PROCEDURE — 74328 X-RAY BILE DUCT ENDOSCOPY: CPT | Performed by: INTERNAL MEDICINE

## 2022-12-27 PROCEDURE — 99214 OFFICE O/P EST MOD 30 MIN: CPT | Performed by: INTERNAL MEDICINE

## 2022-12-27 PROCEDURE — 0F798ZZ DILATION OF COMMON BILE DUCT, VIA NATURAL OR ARTIFICIAL OPENING ENDOSCOPIC: ICD-10-PCS | Performed by: INTERNAL MEDICINE

## 2022-12-27 PROCEDURE — 43262 ENDO CHOLANGIOPANCREATOGRAPH: CPT | Performed by: INTERNAL MEDICINE

## 2022-12-27 PROCEDURE — 43264 ERCP REMOVE DUCT CALCULI: CPT | Performed by: INTERNAL MEDICINE

## 2022-12-27 RX ORDER — ACETAMINOPHEN 325 MG/1
650 TABLET ORAL EVERY 4 HOURS PRN
Status: DISCONTINUED | OUTPATIENT
Start: 2022-12-27 | End: 2022-12-28

## 2022-12-27 RX ORDER — DEXTROSE MONOHYDRATE 25 G/50ML
50 INJECTION, SOLUTION INTRAVENOUS
Status: DISCONTINUED | OUTPATIENT
Start: 2022-12-27 | End: 2022-12-27 | Stop reason: HOSPADM

## 2022-12-27 RX ORDER — NICOTINE POLACRILEX 4 MG
30 LOZENGE BUCCAL
Status: DISCONTINUED | OUTPATIENT
Start: 2022-12-27 | End: 2022-12-27 | Stop reason: HOSPADM

## 2022-12-27 RX ORDER — NICOTINE POLACRILEX 4 MG
15 LOZENGE BUCCAL
Status: DISCONTINUED | OUTPATIENT
Start: 2022-12-27 | End: 2022-12-27 | Stop reason: HOSPADM

## 2022-12-27 RX ORDER — SODIUM CHLORIDE, SODIUM LACTATE, POTASSIUM CHLORIDE, CALCIUM CHLORIDE 600; 310; 30; 20 MG/100ML; MG/100ML; MG/100ML; MG/100ML
INJECTION, SOLUTION INTRAVENOUS CONTINUOUS
Status: DISCONTINUED | OUTPATIENT
Start: 2022-12-27 | End: 2022-12-27

## 2022-12-27 RX ORDER — ONDANSETRON 2 MG/ML
INJECTION INTRAMUSCULAR; INTRAVENOUS AS NEEDED
Status: DISCONTINUED | OUTPATIENT
Start: 2022-12-27 | End: 2022-12-27 | Stop reason: SURG

## 2022-12-27 RX ORDER — SODIUM PHOSPHATE, DIBASIC AND SODIUM PHOSPHATE, MONOBASIC 7; 19 G/133ML; G/133ML
1 ENEMA RECTAL ONCE AS NEEDED
Status: DISCONTINUED | OUTPATIENT
Start: 2022-12-27 | End: 2022-12-28

## 2022-12-27 RX ORDER — LOSARTAN POTASSIUM 100 MG/1
100 TABLET ORAL DAILY
Status: DISCONTINUED | OUTPATIENT
Start: 2022-12-27 | End: 2022-12-28

## 2022-12-27 RX ORDER — HYDRALAZINE HYDROCHLORIDE 20 MG/ML
10 INJECTION INTRAMUSCULAR; INTRAVENOUS EVERY 4 HOURS PRN
Status: DISCONTINUED | OUTPATIENT
Start: 2022-12-27 | End: 2022-12-28

## 2022-12-27 RX ORDER — GLYCOPYRROLATE 0.2 MG/ML
INJECTION, SOLUTION INTRAMUSCULAR; INTRAVENOUS AS NEEDED
Status: DISCONTINUED | OUTPATIENT
Start: 2022-12-27 | End: 2022-12-27 | Stop reason: SURG

## 2022-12-27 RX ORDER — KETOROLAC TROMETHAMINE 15 MG/ML
15 INJECTION, SOLUTION INTRAMUSCULAR; INTRAVENOUS ONCE
Status: COMPLETED | OUTPATIENT
Start: 2022-12-27 | End: 2022-12-27

## 2022-12-27 RX ORDER — ROCURONIUM BROMIDE 10 MG/ML
INJECTION, SOLUTION INTRAVENOUS AS NEEDED
Status: DISCONTINUED | OUTPATIENT
Start: 2022-12-27 | End: 2022-12-27 | Stop reason: SURG

## 2022-12-27 RX ORDER — PANTOPRAZOLE SODIUM 40 MG/1
40 TABLET, DELAYED RELEASE ORAL
Status: DISCONTINUED | OUTPATIENT
Start: 2022-12-28 | End: 2022-12-28

## 2022-12-27 RX ORDER — BISACODYL 10 MG
10 SUPPOSITORY, RECTAL RECTAL
Status: DISCONTINUED | OUTPATIENT
Start: 2022-12-27 | End: 2022-12-28

## 2022-12-27 RX ORDER — SENNOSIDES 8.6 MG
17.2 TABLET ORAL NIGHTLY PRN
Status: DISCONTINUED | OUTPATIENT
Start: 2022-12-27 | End: 2022-12-28

## 2022-12-27 RX ORDER — ENOXAPARIN SODIUM 100 MG/ML
40 INJECTION SUBCUTANEOUS DAILY
Status: DISCONTINUED | OUTPATIENT
Start: 2022-12-27 | End: 2022-12-28

## 2022-12-27 RX ORDER — HYDROMORPHONE HYDROCHLORIDE 1 MG/ML
0.5 INJECTION, SOLUTION INTRAMUSCULAR; INTRAVENOUS; SUBCUTANEOUS ONCE
Status: COMPLETED | OUTPATIENT
Start: 2022-12-27 | End: 2022-12-27

## 2022-12-27 RX ORDER — SODIUM CHLORIDE, SODIUM LACTATE, POTASSIUM CHLORIDE, CALCIUM CHLORIDE 600; 310; 30; 20 MG/100ML; MG/100ML; MG/100ML; MG/100ML
INJECTION, SOLUTION INTRAVENOUS CONTINUOUS
Status: ACTIVE | OUTPATIENT
Start: 2022-12-27 | End: 2022-12-28

## 2022-12-27 RX ORDER — HYDROMORPHONE HYDROCHLORIDE 1 MG/ML
0.2 INJECTION, SOLUTION INTRAMUSCULAR; INTRAVENOUS; SUBCUTANEOUS EVERY 2 HOUR PRN
Status: DISCONTINUED | OUTPATIENT
Start: 2022-12-27 | End: 2022-12-28

## 2022-12-27 RX ORDER — SODIUM CHLORIDE, SODIUM LACTATE, POTASSIUM CHLORIDE, CALCIUM CHLORIDE 600; 310; 30; 20 MG/100ML; MG/100ML; MG/100ML; MG/100ML
INJECTION, SOLUTION INTRAVENOUS CONTINUOUS
Status: DISCONTINUED | OUTPATIENT
Start: 2022-12-28 | End: 2022-12-28

## 2022-12-27 RX ORDER — POLYETHYLENE GLYCOL 3350 17 G/17G
17 POWDER, FOR SOLUTION ORAL DAILY PRN
Status: DISCONTINUED | OUTPATIENT
Start: 2022-12-27 | End: 2022-12-28

## 2022-12-27 RX ORDER — HYDROCODONE BITARTRATE AND ACETAMINOPHEN 5; 325 MG/1; MG/1
1 TABLET ORAL EVERY 4 HOURS PRN
Status: DISCONTINUED | OUTPATIENT
Start: 2022-12-27 | End: 2022-12-28

## 2022-12-27 RX ORDER — NALOXONE HYDROCHLORIDE 0.4 MG/ML
80 INJECTION, SOLUTION INTRAMUSCULAR; INTRAVENOUS; SUBCUTANEOUS AS NEEDED
Status: DISCONTINUED | OUTPATIENT
Start: 2022-12-27 | End: 2022-12-27 | Stop reason: HOSPADM

## 2022-12-27 RX ORDER — MELATONIN
3 NIGHTLY PRN
Status: DISCONTINUED | OUTPATIENT
Start: 2022-12-27 | End: 2022-12-28

## 2022-12-27 RX ORDER — KETOROLAC TROMETHAMINE 30 MG/ML
30 INJECTION, SOLUTION INTRAMUSCULAR; INTRAVENOUS ONCE
Status: DISCONTINUED | OUTPATIENT
Start: 2022-12-27 | End: 2022-12-27

## 2022-12-27 RX ORDER — HYDROCODONE BITARTRATE AND ACETAMINOPHEN 5; 325 MG/1; MG/1
2 TABLET ORAL EVERY 4 HOURS PRN
Status: DISCONTINUED | OUTPATIENT
Start: 2022-12-27 | End: 2022-12-28

## 2022-12-27 RX ORDER — HYDROMORPHONE HYDROCHLORIDE 1 MG/ML
0.4 INJECTION, SOLUTION INTRAMUSCULAR; INTRAVENOUS; SUBCUTANEOUS EVERY 2 HOUR PRN
Status: DISCONTINUED | OUTPATIENT
Start: 2022-12-27 | End: 2022-12-28

## 2022-12-27 RX ORDER — LIDOCAINE HYDROCHLORIDE 20 MG/ML
INJECTION, SOLUTION EPIDURAL; INFILTRATION; INTRACAUDAL; PERINEURAL AS NEEDED
Status: DISCONTINUED | OUTPATIENT
Start: 2022-12-27 | End: 2022-12-27 | Stop reason: SURG

## 2022-12-27 RX ORDER — HYDROMORPHONE HYDROCHLORIDE 1 MG/ML
0.8 INJECTION, SOLUTION INTRAMUSCULAR; INTRAVENOUS; SUBCUTANEOUS EVERY 2 HOUR PRN
Status: DISCONTINUED | OUTPATIENT
Start: 2022-12-27 | End: 2022-12-28

## 2022-12-27 RX ORDER — DILTIAZEM HYDROCHLORIDE 120 MG/1
240 CAPSULE, EXTENDED RELEASE ORAL DAILY
Status: DISCONTINUED | OUTPATIENT
Start: 2022-12-27 | End: 2022-12-27

## 2022-12-27 RX ADMIN — LIDOCAINE HYDROCHLORIDE 70 MG: 20 INJECTION, SOLUTION EPIDURAL; INFILTRATION; INTRACAUDAL; PERINEURAL at 12:35:00

## 2022-12-27 RX ADMIN — SODIUM CHLORIDE, SODIUM LACTATE, POTASSIUM CHLORIDE, CALCIUM CHLORIDE: 600; 310; 30; 20 INJECTION, SOLUTION INTRAVENOUS at 13:58:00

## 2022-12-27 RX ADMIN — ONDANSETRON 4 MG: 2 INJECTION INTRAMUSCULAR; INTRAVENOUS at 12:39:00

## 2022-12-27 RX ADMIN — SODIUM CHLORIDE, SODIUM LACTATE, POTASSIUM CHLORIDE, CALCIUM CHLORIDE: 600; 310; 30; 20 INJECTION, SOLUTION INTRAVENOUS at 12:32:00

## 2022-12-27 RX ADMIN — ROCURONIUM BROMIDE 5 MG: 10 INJECTION, SOLUTION INTRAVENOUS at 12:35:00

## 2022-12-27 RX ADMIN — GLYCOPYRROLATE 0.2 MG: 0.2 INJECTION, SOLUTION INTRAMUSCULAR; INTRAVENOUS at 12:34:00

## 2022-12-27 NOTE — ANESTHESIA PROCEDURE NOTES
Airway  Date/Time: 12/27/2022 12:39 PM  Airway not difficult    General Information and Staff    Anesthesiologist: Leisa Moss MD  Resident/CRNA: Justina Subramanian CRNA  Performed: CRNA     Indications and Patient Condition  Indications for airway management: anesthesia  Sedation level: deep  Preoxygenated: yes  Patient position: sniffing  Mask difficulty assessment: 0 - not attempted    Final Airway Details  Final airway type: endotracheal airway      Successful airway: ETT     Successful intubation technique: direct laryngoscopy  Facilitating devices/methods: anterior pressure/BURP and intubating stylet  Blade: Aston  Blade size: #3  ETT size (mm): 7.0    Cormack-Lehane Classification: grade IIA - partial view of glottis  Measured from: teeth  ETT to teeth (cm): 21    Additional Comments  Easy, atraumatic intubation. Dentition, lips and mucosa unchanged.

## 2022-12-27 NOTE — ANESTHESIA POSTPROCEDURE EVALUATION
Patient: Crystal Shape    Procedure Summary     Date: 12/27/22 Room / Location: [address] [place] [address] ENDOSCOPY 05 / [address] [place] [address] ENDOSCOPY    Anesthesia Start: 1399 Anesthesia Stop:     Procedure: ENDOSCOPIC RETROGRADE CHOLANGIOPANCREATOGRAPHY (ERCP) Diagnosis:       RUQ pain      Nausea      Abnormal liver enzymes      (biliary sludge, biliary dilation)    Surgeons: Nehal Herrera MD Anesthesiologist: Frantz Cerda MD    Anesthesia Type: MAC ASA Status: 3          Anesthesia Type: MAC    Vitals Value Taken Time   /70 12/27/22 1359   Temp  12/27/22 1359   Pulse 95 12/27/22 1359   Resp 16 12/27/22 1359   SpO2 99 % 12/27/22 1359       [address] [place] [address] AN Post Evaluation:   Patient Evaluated in PACU  Patient Participation: complete - patient participated  Level of Consciousness: awake and alert  Pain Score: 0  Pain Management: adequate  Airway Patency:patent  Dental exam unchanged from preop  Yes    Cardiovascular Status: acceptable  Respiratory Status: acceptable  Postoperative Hydration acceptable  Comments: No acute distress. Denies pain,N/V. Report given.        Renny Martin CRNA  12/27/2022 1:59 PM

## 2022-12-27 NOTE — PROGRESS NOTES
[facility] Pharmacy Note:  Age Based Dose Adjustment    Liam Diaz has been prescribed ketorolac (TORADOL) 30 mg IV ONCE  Patient is >71 years old therefore the dose has been adjusted to 15 mg IV ONCE.      Thank you,  Mike Alarcon, PharmD  12/27/2022 5:43 PM

## 2022-12-28 VITALS
HEART RATE: 68 BPM | OXYGEN SATURATION: 93 % | DIASTOLIC BLOOD PRESSURE: 66 MMHG | RESPIRATION RATE: 18 BRPM | SYSTOLIC BLOOD PRESSURE: 154 MMHG | WEIGHT: 143 LBS | BODY MASS INDEX: 27.71 KG/M2 | TEMPERATURE: 99 F | HEIGHT: 60.25 IN

## 2022-12-28 LAB
ALBUMIN SERPL-MCNC: 3 G/DL (ref 3.4–5)
ALBUMIN/GLOB SERPL: 1.1 {RATIO} (ref 1–2)
ALP LIVER SERPL-CCNC: 77 U/L
ALT SERPL-CCNC: 70 U/L
ANION GAP SERPL CALC-SCNC: 7 MMOL/L (ref 0–18)
AST SERPL-CCNC: 31 U/L (ref 15–37)
BASOPHILS # BLD AUTO: 0.04 X10(3) UL (ref 0–0.2)
BASOPHILS NFR BLD AUTO: 0.6 %
BILIRUB SERPL-MCNC: 0.5 MG/DL (ref 0.1–2)
BUN BLD-MCNC: 13 MG/DL (ref 7–18)
BUN/CREAT SERPL: 20.6 (ref 10–20)
CALCIUM BLD-MCNC: 9 MG/DL (ref 8.5–10.1)
CHLORIDE SERPL-SCNC: 110 MMOL/L (ref 98–112)
CO2 SERPL-SCNC: 27 MMOL/L (ref 21–32)
CREAT BLD-MCNC: 0.63 MG/DL
DEPRECATED RDW RBC AUTO: 39.4 FL (ref 35.1–46.3)
EOSINOPHIL # BLD AUTO: 0.14 X10(3) UL (ref 0–0.7)
EOSINOPHIL NFR BLD AUTO: 2.2 %
ERYTHROCYTE [DISTWIDTH] IN BLOOD BY AUTOMATED COUNT: 12 % (ref 11–15)
GFR SERPLBLD BASED ON 1.73 SQ M-ARVRAT: 93 ML/MIN/1.73M2 (ref 60–?)
GLOBULIN PLAS-MCNC: 2.7 G/DL (ref 2.8–4.4)
GLUCOSE BLD-MCNC: 137 MG/DL (ref 70–99)
GLUCOSE BLDC GLUCOMTR-MCNC: 134 MG/DL (ref 70–99)
HCT VFR BLD AUTO: 35.1 %
HGB BLD-MCNC: 11.6 G/DL
IMM GRANULOCYTES # BLD AUTO: 0.02 X10(3) UL (ref 0–1)
IMM GRANULOCYTES NFR BLD: 0.3 %
LYMPHOCYTES # BLD AUTO: 2.05 X10(3) UL (ref 1–4)
LYMPHOCYTES NFR BLD AUTO: 31.6 %
MAGNESIUM SERPL-MCNC: 1.7 MG/DL (ref 1.6–2.6)
MCH RBC QN AUTO: 29.8 PG (ref 26–34)
MCHC RBC AUTO-ENTMCNC: 33 G/DL (ref 31–37)
MCV RBC AUTO: 90.2 FL
MONOCYTES # BLD AUTO: 0.56 X10(3) UL (ref 0.1–1)
MONOCYTES NFR BLD AUTO: 8.6 %
NEUTROPHILS # BLD AUTO: 3.68 X10 (3) UL (ref 1.5–7.7)
NEUTROPHILS # BLD AUTO: 3.68 X10(3) UL (ref 1.5–7.7)
NEUTROPHILS NFR BLD AUTO: 56.7 %
OSMOLALITY SERPL CALC.SUM OF ELEC: 300 MOSM/KG (ref 275–295)
PHOSPHATE SERPL-MCNC: 3.5 MG/DL (ref 2.5–4.9)
PLATELET # BLD AUTO: 188 10(3)UL (ref 150–450)
POTASSIUM SERPL-SCNC: 4.2 MMOL/L (ref 3.5–5.1)
PROT SERPL-MCNC: 5.7 G/DL (ref 6.4–8.2)
RBC # BLD AUTO: 3.89 X10(6)UL
SODIUM SERPL-SCNC: 144 MMOL/L (ref 136–145)
WBC # BLD AUTO: 6.5 X10(3) UL (ref 4–11)

## 2022-12-28 PROCEDURE — 99214 OFFICE O/P EST MOD 30 MIN: CPT | Performed by: INTERNAL MEDICINE

## 2022-12-28 RX ORDER — DIPHENOXYLATE HYDROCHLORIDE AND ATROPINE SULFATE 2.5; .025 MG/1; MG/1
1-2 TABLET ORAL 3 TIMES DAILY PRN
Qty: 60 TABLET | Refills: 5 | Status: SHIPPED | OUTPATIENT
Start: 2022-12-28

## 2022-12-28 NOTE — PLAN OF CARE
Pt is A&O x4. Breathing on room air. Accucheck ACHS. Lovenox and SCDs for DVT prophylaxis. Voiding freely. Up with standby assist. Denies needing any prn pain med. Call light within reach. Safety precaution in place. D.C. plan is return home when stable.      Problem: Patient Centered Care  Goal: Patient preferences are identified and integrated in the patient's plan of care  Description: Interventions:  - What would you like us to know as we care for you?   - Provide timely, complete, and accurate information to patient/family  - Incorporate patient and family knowledge, values, beliefs, and cultural backgrounds into the planning and delivery of care  - Encourage patient/family to participate in care and decision-making at the level they choose  - Honor patient and family perspectives and choices  Outcome: Progressing     Problem: PAIN - ADULT  Goal: Verbalizes/displays adequate comfort level or patient's stated pain goal  Description: INTERVENTIONS:  - Encourage pt to monitor pain and request assistance  - Assess pain using appropriate pain scale  - Administer analgesics based on type and severity of pain and evaluate response  - Implement non-pharmacological measures as appropriate and evaluate response  - Consider cultural and social influences on pain and pain management  - Manage/alleviate anxiety  - Utilize distraction and/or relaxation techniques  - Monitor for opioid side effects  - Notify MD/LIP if interventions unsuccessful or patient reports new pain  - Anticipate increased pain with activity and pre-medicate as appropriate  Outcome: Progressing     Problem: RISK FOR INFECTION - ADULT  Goal: Absence of fever/infection during anticipated neutropenic period  Description: INTERVENTIONS  - Monitor WBC  - Administer growth factors as ordered  - Implement neutropenic guidelines  Outcome: Progressing     Problem: SAFETY ADULT - FALL  Goal: Free from fall injury  Description: INTERVENTIONS:  - Assess pt frequently for physical needs  - Identify cognitive and physical deficits and behaviors that affect risk of falls.   - [place] fall precautions as indicated by assessment.  - Educate pt/family on patient safety including physical limitations  - Instruct pt to call for assistance with activity based on assessment  - Modify environment to reduce risk of injury  - Provide assistive devices as appropriate  - Consider OT/PT consult to assist with strengthening/mobility  - Encourage toileting schedule  Outcome: Progressing     Problem: DISCHARGE PLANNING  Goal: Discharge to home or other facility with appropriate resources  Description: INTERVENTIONS:  - Identify barriers to discharge w/pt and caregiver  - Include patient/family/discharge partner in discharge planning  - Arrange for needed discharge resources and transportation as appropriate  - Identify discharge learning needs (meds, wound care, etc)  - Arrange for interpreters to assist at discharge as needed  - Consider post-discharge preferences of patient/family/discharge partner  - Complete POLST form as appropriate  - Assess patient's ability to be responsible for managing their own health  - Refer to Case Management Department for coordinating discharge planning if the patient needs post-hospital services based on physician/LIP order or complex needs related to functional status, cognitive ability or social support system  Outcome: Progressing

## 2022-12-28 NOTE — PROGRESS NOTES
Patient discharged home ,daughter picking patient up, reviewed medications, new script from  for Lomotil-sent to pharmacy. Instructed to hold diltiazem by , patient was instructed to monitor heart rate. Continue telmisartan in evening as previously taking. Follow up with  as he discussed with patient. patient verbalized understanding of discharge instructions and medications.

## 2022-12-29 ENCOUNTER — PATIENT MESSAGE (OUTPATIENT)
Dept: GASTROENTEROLOGY | Facility: CLINIC | Age: 72
End: 2022-12-29

## 2023-01-02 RX ORDER — TRAMADOL HYDROCHLORIDE 50 MG/1
50 TABLET ORAL EVERY 8 HOURS PRN
Qty: 30 TABLET | Refills: 1 | Status: SHIPPED | OUTPATIENT
Start: 2023-01-02

## 2023-01-03 NOTE — TELEPHONE ENCOUNTER
Betty Eid is calling to confirm that  is aware that pt is on both tramadol and lomotil and is okay with that

## 2023-01-04 NOTE — TELEPHONE ENCOUNTER
Yes, okay to take the Lomotil and the tramadol. I prescribed the tramadol previously Monday 1/2/2023.

## 2023-01-04 NOTE — TELEPHONE ENCOUNTER
Routed to Dr Joon Palencia. Is it ok that the pt is still on Lomotil(controlled substance) and Tramadol? I will call [facility] to let them know that it's ok.     Prema Dickinson is asking for a RF on the Tramadol

## 2023-03-17 ENCOUNTER — TELEPHONE (OUTPATIENT)
Facility: CLINIC | Age: 73
End: 2023-03-17

## 2023-03-17 DIAGNOSIS — K86.9 LESION OF PANCREAS: Primary | ICD-10-CM

## 2023-03-17 DIAGNOSIS — K83.8 DILATED BILE DUCT: ICD-10-CM

## 2023-03-21 NOTE — TELEPHONE ENCOUNTER
Thanks all. We recently performed ERCP and sphincterotomy for Ms. Carissa Vargas 12/27/2022 for her unusual recurring abdominal pain and abnormal LFTs, concern for bile duct problem, \"sphincter of Oddi dysfunction. \"    How is she doing? Previous MRI 1 year ago showed small pancreatic cystic lesion less than 1 inch in size. MRI MRCP ordered to follow-up.

## 2023-03-21 NOTE — TELEPHONE ENCOUNTER
Dr Mona Estrada,    I spoke to Mayra Diaz    I gave her the phone number for Central Scheduling [phone]. She will call and make an appointment for the MRI    She states she is feeling \"wonderful! \"    She is not in any pain of any kind!!

## 2023-03-21 NOTE — TELEPHONE ENCOUNTER
Pt states [facility] does not perform the type of MRI she needs and she is requesting to speak to Rn about this please call thanks

## 2023-03-23 ENCOUNTER — TELEPHONE (OUTPATIENT)
Facility: CLINIC | Age: 73
End: 2023-03-23

## 2023-03-23 NOTE — TELEPHONE ENCOUNTER
I spoke to Tyler Rojas    I faxed the order for the MRI to [facility] @ [phone].      Fax confirmation received

## 2023-03-23 NOTE — TELEPHONE ENCOUNTER
Samantha Arellano this is amazing news. She was really sick before the ERCP!!     Thank you for faxing in the MRI order.    - jazlyn

## 2023-03-23 NOTE — TELEPHONE ENCOUNTER
Please refer to TE from 3/17/2023    [facility] is calling because they received the MRI order, but they do not do that order with the contrast.

## 2023-03-23 NOTE — TELEPHONE ENCOUNTER
Dr Indu Almanzar asked me to fax her MRI order to [facility] where they have open MRI    I just spoke with a tech at  [facility] and they do not perform the MRI MRCP with contrast.    Is this ok, or should pt go someplace else for her MRI? ?

## 2023-03-24 NOTE — TELEPHONE ENCOUNTER
That is very concerning and makes me question the quality of this imaging center. Unfortunately, pancreatic lesion will necessitate IV contrast to help us see any hint of precancerous changes. We need IV contrast to do that.     Looks like [facility] has open MRI as previously discussed, hopefully they can give IV contrast.    - cb

## 2023-03-24 NOTE — TELEPHONE ENCOUNTER
Patient calling back to provide fax number. Will be going to [facility] location.  Fax number is [phone]

## 2023-03-24 NOTE — TELEPHONE ENCOUNTER
Spoke to patient and relayed Dr. Yvonne Henderson message as shown below. Patient was provided with [facility] information. Patient states will call our office and or send MyChart message with locations she chooses to go to so we can fax MRI order to [facility].

## 2023-03-27 ENCOUNTER — PATIENT MESSAGE (OUTPATIENT)
Dept: GASTROENTEROLOGY | Facility: CLINIC | Age: 73
End: 2023-03-27

## 2023-04-12 ENCOUNTER — TELEPHONE (OUTPATIENT)
Facility: CLINIC | Age: 73
End: 2023-04-12

## 2023-04-12 NOTE — TELEPHONE ENCOUNTER
Received results of MRI/MRCP from [facility] from 04/10/2023.     Place on [facility] [employer] [facility] staci for review

## 2023-04-17 ENCOUNTER — PATIENT MESSAGE (OUTPATIENT)
Dept: GASTROENTEROLOGY | Facility: CLINIC | Age: 73
End: 2023-04-17

## 2023-04-17 ENCOUNTER — PATIENT MESSAGE (OUTPATIENT)
Dept: OTOLARYNGOLOGY | Facility: CLINIC | Age: 73
End: 2023-04-17

## 2023-04-18 NOTE — TELEPHONE ENCOUNTER
I spoke to the pt     I reviewed Dr Cox's recommendations and f/u instructions with the pt    She voices understanding    I sent a message to pt outreach for the pt to repeat her CT scan in 2 years

## 2023-04-18 NOTE — TELEPHONE ENCOUNTER
Thank you Lin Park,    Please call Ms. Solano to advise her that this 1 year follow-up MRI scan performed 4/10/2023 looked great. This year, on the [facility] MRI scanner the pancreatic tail lesion measured 1.9 cm / 19 mm. It is less than an inch in size. This is smaller than the measurement from the previous CT scan of 2/4/2022 (24 mm) and EUS exam of 3/28/2022 (21 mm). This is great news. This small incidental benign appearing lesion is not getting larger. If she wishes, we could repeat the CT scan in 2 years to follow-up.      - cb

## 2023-04-20 RX ORDER — IPRATROPIUM BROMIDE 42 UG/1
2 SPRAY, METERED NASAL 3 TIMES DAILY
Qty: 15 ML | Refills: 5 | Status: SHIPPED | OUTPATIENT
Start: 2023-04-20

## 2023-04-20 NOTE — TELEPHONE ENCOUNTER
This refill request is being sent to the provider for the following reason:  []Patient has not had an appointment within the past 12 months but has made an appointment on: ___  [x]Medication is not within protocol  []Patient did not complete follow up recommendations  []Other: ___  Dr. Mili Hernandez, Please review and send. LOV was 9/6/22. Thank you.

## 2023-06-12 ENCOUNTER — PATIENT MESSAGE (OUTPATIENT)
Dept: GASTROENTEROLOGY | Facility: CLINIC | Age: 73
End: 2023-06-12

## 2023-06-12 RX ORDER — PANTOPRAZOLE SODIUM 40 MG/1
40 TABLET, DELAYED RELEASE ORAL
Qty: 90 TABLET | Refills: 3 | Status: SHIPPED | OUTPATIENT
Start: 2023-06-12

## 2023-06-12 NOTE — TELEPHONE ENCOUNTER
Dr. Girma Ji    Patient requesting refill for pantoprazole   LOV 5/27/2022, had procedure 12/27/22  LR-we never ordered this it was a patient reported medication so cannot auto fill per protocol.     Thank you no

## 2023-06-12 NOTE — TELEPHONE ENCOUNTER
From: Zak Long  To: Terrance Marcial MD  Sent: 6/12/2023 9:08 AM CDT  Subject: Medication    Hi Dr. Honorio Polo  I need a refill of my Pantoprazole 40MG. If you could call that into my [facility] in [facility] on [address] I would appreciate it. Ph # [phone]. Hope all is well with you!   Thank you,  Serina Shah

## 2023-08-10 ENCOUNTER — PATIENT MESSAGE (OUTPATIENT)
Dept: GASTROENTEROLOGY | Facility: CLINIC | Age: 73
End: 2023-08-10

## 2023-08-13 NOTE — TELEPHONE ENCOUNTER
PHILLIP GOLD !!!! The amazing Mahogany Mcintosh just checked in and sounds like she needs some more rifaximin from [facility] I just put in a prescription for 50. She needs us to ?email her the prescription if possible. I printed it today Sunday to the office printer:    [id number]    If it is still sitting in the printer tray, can you get this prescription to her? I can hand sign it as well to make everyone extra happy.      - cb

## 2023-08-14 NOTE — TELEPHONE ENCOUNTER
I spoke to [place]. I told Dr Nathaly Sorto that the [facility] only accepts prescriptions for 100 tablets    I let her know Dr Nathaly Sorto reprinted a prescription for 100 tablets. She will come to the office to  the prescription at the .

## 2023-08-16 ENCOUNTER — PATIENT MESSAGE (OUTPATIENT)
Dept: GASTROENTEROLOGY | Facility: CLINIC | Age: 73
End: 2023-08-16

## 2023-08-21 RX ORDER — DIPHENOXYLATE HYDROCHLORIDE AND ATROPINE SULFATE 2.5; .025 MG/1; MG/1
1-2 TABLET ORAL 3 TIMES DAILY PRN
Qty: 60 TABLET | Refills: 5 | Status: SHIPPED | OUTPATIENT
Start: 2023-08-21

## 2024-01-09 ENCOUNTER — LAB ENCOUNTER (OUTPATIENT)
Dept: LAB | Facility: HOSPITAL | Age: 74
End: 2024-01-09
Attending: INTERNAL MEDICINE
Payer: MEDICARE

## 2024-01-09 DIAGNOSIS — R63.4 WEIGHT LOSS: ICD-10-CM

## 2024-01-09 DIAGNOSIS — R53.83 FATIGUE: Primary | ICD-10-CM

## 2024-01-09 DIAGNOSIS — R19.7 DIARRHEA: ICD-10-CM

## 2024-01-09 LAB
ALBUMIN SERPL-MCNC: 4.7 G/DL (ref 3.2–4.8)
ALBUMIN/GLOB SERPL: 1.6 {RATIO} (ref 1–2)
ALP LIVER SERPL-CCNC: 60 U/L
ALT SERPL-CCNC: 10 U/L
ANION GAP SERPL CALC-SCNC: 11 MMOL/L (ref 0–18)
AST SERPL-CCNC: 12 U/L (ref ?–34)
BASOPHILS # BLD AUTO: 0.07 X10(3) UL (ref 0–0.2)
BASOPHILS NFR BLD AUTO: 0.7 %
BILIRUB SERPL-MCNC: 0.6 MG/DL (ref 0.2–1.1)
BILIRUB UR QL: NEGATIVE
BUN BLD-MCNC: 27 MG/DL (ref 9–23)
BUN/CREAT SERPL: 25.5 (ref 10–20)
CALCIUM BLD-MCNC: 10.1 MG/DL (ref 8.7–10.4)
CHLORIDE SERPL-SCNC: 101 MMOL/L (ref 98–112)
CO2 SERPL-SCNC: 23 MMOL/L (ref 21–32)
COLOR UR: YELLOW
CREAT BLD-MCNC: 1.06 MG/DL
DEPRECATED RDW RBC AUTO: 38.5 FL (ref 35.1–46.3)
EGFRCR SERPLBLD CKD-EPI 2021: 55 ML/MIN/1.73M2 (ref 60–?)
EOSINOPHIL # BLD AUTO: 0.1 X10(3) UL (ref 0–0.7)
EOSINOPHIL NFR BLD AUTO: 1 %
ERYTHROCYTE [DISTWIDTH] IN BLOOD BY AUTOMATED COUNT: 12 % (ref 11–15)
FASTING STATUS PATIENT QL REPORTED: NO
GLOBULIN PLAS-MCNC: 2.9 G/DL (ref 2.8–4.4)
GLUCOSE BLD-MCNC: 153 MG/DL (ref 70–99)
GLUCOSE UR-MCNC: NORMAL MG/DL
HCT VFR BLD AUTO: 38.9 %
HGB BLD-MCNC: 12.9 G/DL
HYALINE CASTS #/AREA URNS AUTO: PRESENT /LPF
IMM GRANULOCYTES # BLD AUTO: 0.03 X10(3) UL (ref 0–1)
IMM GRANULOCYTES NFR BLD: 0.3 %
KETONES UR-MCNC: NEGATIVE MG/DL
LEUKOCYTE ESTERASE UR QL STRIP.AUTO: 500
LYMPHOCYTES # BLD AUTO: 2.29 X10(3) UL (ref 1–4)
LYMPHOCYTES NFR BLD AUTO: 22.1 %
MCH RBC QN AUTO: 28.9 PG (ref 26–34)
MCHC RBC AUTO-ENTMCNC: 33.2 G/DL (ref 31–37)
MCV RBC AUTO: 87 FL
MONOCYTES # BLD AUTO: 0.6 X10(3) UL (ref 0.1–1)
MONOCYTES NFR BLD AUTO: 5.8 %
NEUTROPHILS # BLD AUTO: 7.28 X10 (3) UL (ref 1.5–7.7)
NEUTROPHILS # BLD AUTO: 7.28 X10(3) UL (ref 1.5–7.7)
NEUTROPHILS NFR BLD AUTO: 70.1 %
NITRITE UR QL STRIP.AUTO: NEGATIVE
OSMOLALITY SERPL CALC.SUM OF ELEC: 288 MOSM/KG (ref 275–295)
PH UR: 5 [PH] (ref 5–8)
PLATELET # BLD AUTO: 351 10(3)UL (ref 150–450)
POTASSIUM SERPL-SCNC: 3.3 MMOL/L (ref 3.5–5.1)
PROT SERPL-MCNC: 7.6 G/DL (ref 5.7–8.2)
PROT UR-MCNC: 50 MG/DL
RBC # BLD AUTO: 4.47 X10(6)UL
RBC #/AREA URNS AUTO: >10 /HPF
SODIUM SERPL-SCNC: 135 MMOL/L (ref 136–145)
SP GR UR STRIP: 1.02 (ref 1–1.03)
TSI SER-ACNC: 0.7 MIU/ML (ref 0.55–4.78)
UROBILINOGEN UR STRIP-ACNC: NORMAL
WBC # BLD AUTO: 10.4 X10(3) UL (ref 4–11)

## 2024-01-09 PROCEDURE — 84443 ASSAY THYROID STIM HORMONE: CPT

## 2024-01-09 PROCEDURE — 85025 COMPLETE CBC W/AUTO DIFF WBC: CPT

## 2024-01-09 PROCEDURE — 36415 COLL VENOUS BLD VENIPUNCTURE: CPT

## 2024-01-09 PROCEDURE — 80053 COMPREHEN METABOLIC PANEL: CPT

## 2024-01-09 PROCEDURE — 81001 URINALYSIS AUTO W/SCOPE: CPT

## 2024-01-09 PROCEDURE — 87086 URINE CULTURE/COLONY COUNT: CPT

## 2024-01-09 PROCEDURE — 81015 MICROSCOPIC EXAM OF URINE: CPT

## 2024-01-18 ENCOUNTER — OFFICE VISIT (OUTPATIENT)
Dept: OTOLARYNGOLOGY | Facility: CLINIC | Age: 74
End: 2024-01-18
Payer: MEDICARE

## 2024-01-18 DIAGNOSIS — H61.23 CERUMEN DEBRIS ON TYMPANIC MEMBRANE OF BOTH EARS: Primary | ICD-10-CM

## 2024-01-18 DIAGNOSIS — J34.89 NASAL CRUSTING: ICD-10-CM

## 2024-01-18 DIAGNOSIS — L43.8 EROSIVE ORAL LICHEN PLANUS: ICD-10-CM

## 2024-01-18 PROCEDURE — 99213 OFFICE O/P EST LOW 20 MIN: CPT | Performed by: SPECIALIST

## 2024-01-18 RX ORDER — EZETIMIBE 10 MG/1
10 TABLET ORAL DAILY
COMMUNITY
Start: 2023-08-16 | End: 2024-08-10

## 2024-01-18 RX ORDER — DILTIAZEM HYDROCHLORIDE 120 MG/1
120 CAPSULE, EXTENDED RELEASE ORAL DAILY
COMMUNITY
Start: 2023-12-17

## 2024-01-18 RX ORDER — TOPIRAMATE 50 MG/1
50 TABLET, FILM COATED ORAL NIGHTLY
COMMUNITY
Start: 2023-11-06

## 2024-01-18 RX ORDER — CLOBETASOL PROPIONATE 0.5 MG/G
CREAM TOPICAL
Qty: 30 G | Refills: 2 | Status: SHIPPED | OUTPATIENT
Start: 2024-01-18

## 2024-01-18 RX ORDER — POTASSIUM CHLORIDE 750 MG/1
10 TABLET, FILM COATED, EXTENDED RELEASE ORAL DAILY
COMMUNITY
Start: 2024-01-10

## 2024-01-18 RX ORDER — IPRATROPIUM BROMIDE 42 UG/1
2 SPRAY, METERED NASAL 3 TIMES DAILY
Qty: 15 ML | Refills: 5 | Status: SHIPPED | OUTPATIENT
Start: 2024-01-18

## 2024-01-18 RX ORDER — CIPROFLOXACIN 250 MG/1
250 TABLET, FILM COATED ORAL 2 TIMES DAILY
COMMUNITY
Start: 2024-01-10

## 2024-01-18 RX ORDER — HYDROCHLOROTHIAZIDE 12.5 MG/1
12.5 CAPSULE, GELATIN COATED ORAL DAILY
COMMUNITY

## 2024-01-19 NOTE — PROGRESS NOTES
Yari Solano is a 73 year old female.   Chief Complaint   Patient presents with    Ear Wax     Ear cleaning      HPI:   Patient here to get her ears nose and throat checked    Current Outpatient Medications   Medication Sig Dispense Refill    topiramate 50 MG Oral Tab Take 1 tablet (50 mg total) by mouth nightly.      Potassium Chloride ER 10 MEQ Oral Tab CR Take 1 tablet (10 mEq total) by mouth daily.      ezetimibe 10 MG Oral Tab Take 1 tablet (10 mg total) by mouth daily.      hydroCHLOROthiazide 12.5 MG Oral Cap Take 1 capsule (12.5 mg total) by mouth daily.      DILT- MG Oral Capsule SR 24 Hr Take 1 capsule (120 mg total) by mouth daily.      ipratropium 0.06 % Nasal Solution 2 sprays by Nasal route 3 (three) times daily. As needed for postnasal drainage. 15 mL 5    clobetasol 0.05 % External Cream For use with lichen planus orally twice daily for 7 days 30 g 2    diphenoxylate-atropine 2.5-0.025 MG Oral Tab Take 1-2 tablets by mouth 3 (three) times daily as needed for Diarrhea. 60 tablet 5    rifAXIMin 550 MG Oral Tab Take 1 tablet (550 mg total) by mouth 3 (three) times daily. 100 tablet 2    pantoprazole 40 MG Oral Tab EC Take 1 tablet (40 mg total) by mouth every morning before breakfast. 90 tablet 3    clobetasol 0.05 % External Cream Apply 1 Application topically 2 (two) times daily. 30 g 3    metFORMIN 500 MG Oral Tab Take 2 tablets (1,000 mg total) by mouth 2 (two) times daily with meals. 360 tablet 3    traMADol 50 MG Oral Tab Take 1 tablet (50 mg total) by mouth every 8 (eight) hours as needed for Pain. 30 tablet 0    telmisartan 80 MG Oral Tab Take 1 tablet (80 mg total) by mouth daily. (Patient taking differently: Take 1 tablet (80 mg total) by mouth nightly.) 90 tablet 3    estradiol (ESTRACE) 0.5 MG Oral Tab Take 1 tablet (0.5 mg total) by mouth daily. 90 tablet 1    Glucose Blood (FREESTYLE LITE TEST) In Vitro Strip Test Blood Sugar Once Daily. Non-Insulin Dependent Diabetes Type II.  E11.9. 100 strip 3    Blood Glucose Monitoring Suppl (ACCU-CHEK TYRONE) Does not apply Device Test Blood Sugar Once Daily. Non-Insulin Dependent Diabetes Type II. E11.9. 1 Device 0    ACCU-CHEK SOFTCLIX LANCETS Does not apply Misc Test Blood Sugar Once Daily. Non-Insulin Dependent Diabetes Type II. E11.9. 100 each 3    Probiotic Product (VSL#3) Oral Cap Take by mouth daily.        Multiple Vitamins-Minerals (MULTI VITAMIN/MINERALS) Oral Tab Take 1 tablet by mouth daily.        ciprofloxacin 250 MG Oral Tab Take 1 tablet (250 mg total) by mouth 2 (two) times daily. (Patient not taking: Reported on 2024)      traMADol 50 MG Oral Tab Take 1 tablet (50 mg total) by mouth every 8 (eight) hours as needed for Pain. (Patient not taking: Reported on 2024) 30 tablet 1      Past Medical History:   Diagnosis Date    Agatston coronary artery calcium score between 200 and 399 2020    score 356    Cataract     Chronic diarrhea 10/04/2017    Diabetic retinopathy of left eye (HCC) 10/2018    High blood pressure     IBS (irritable bowel syndrome)     Incontinence     urine    Internal hemorrhoids     Migraines     Osteoarthritis     Other and unspecified hyperlipidemia     Pancreas cyst 10/04/2017    Type II or unspecified type diabetes mellitus without mention of complication, not stated as uncontrolled     Unspecified essential hypertension       Social History:  Social History     Socioeconomic History    Marital status: Single   Occupational History    Occupation: [employer]    Tobacco Use    Smoking status: Former     Packs/day: 0.33     Years: 30.00     Additional pack years: 0.00     Total pack years: 9.90     Types: Cigarettes     Quit date: 1990     Years since quittin.0    Smokeless tobacco: Never   Vaping Use    Vaping Use: Never used   Substance and Sexual Activity    Alcohol use: No     Alcohol/week: 0.0 standard drinks of alcohol    Drug use: No   Other Topics Concern    Caffeine Concern Yes   Social  History Narrative    ** Merged History Encounter **             REVIEW OF SYSTEMS:   GENERAL HEALTH: feels well otherwise  GENERAL : denies fever, chills, sweats, weight loss, weight gain  SKIN: denies any unusual skin lesions or rashes  RESPIRATORY: denies shortness of breath with exertion  NEURO: denies headaches    EXAM:   There were no vitals taken for this visit.  System Details   Skin Inspection - Normal.   Constitutional Overall appearance - Normal.   Head/Face Facial features - Normal. Eyebrows - Normal. Skull - Normal.   Eyes Conjunctiva - Right: Normal, Left: Normal. Pupil - Right: Normal, Left: Normal.    Ears Inspection - Right: Normal, Left: Normal.   Ears = bilateral cerumen occlussions.    Fully cleaned under microscope using instrumentation and suctioning.    Normal tympanic membranes.     Nasal External nose - Normal.   Nasal septum - Normal.  Turbinates -nasal congestion and crusting   Oral/Oropharynx Lips - Normal, Tonsils - Normal, Tongue - Normal.  No oral lichen planus on the day of the visit   Neck Exam Inspection - Normal. Palpation - Normal. Parotid gland - Normal. Thyroid gland - Normal.   Lymph Detail Submental. Submandibular. Anterior cervical. Posterior cervical. Supraclavicular all without enlargement   Psychiatric Orientation - Oriented to time, place, person & situation. Appropriate mood and affect.   Neurological Memory - Normal. Cranial nerves - Cranial nerves II through XII grossly intact.     ASSESSMENT AND PLAN:   1. Cerumen debris on tympanic membrane of both ears  Fully cleaned    2. Erosive oral lichen planus  Renew clobetasol cream 0.05%    3. Nasal crusting  Congestion renew ipratropium bromide nasal spray 0.06%  Follow-up in 1 year's time, sooner if problems.      The patient indicates understanding of these issues and agrees to the plan.      Peace Chamorro MD  1/18/2024  9:41 PM

## 2024-01-19 NOTE — PATIENT INSTRUCTIONS
Cerumen was fully cleaned from both your ears.  I noted the clobetasol cream for your lichen planus.  I also renewed your ipratropium bromide for your nasal crusting.  Follow up in 1 years time, sooner if problems.

## 2024-01-20 ENCOUNTER — LAB ENCOUNTER (OUTPATIENT)
Dept: LAB | Facility: HOSPITAL | Age: 74
End: 2024-01-20
Attending: INTERNAL MEDICINE
Payer: MEDICARE

## 2024-01-20 DIAGNOSIS — R19.7 DIARRHEA: ICD-10-CM

## 2024-01-20 DIAGNOSIS — R53.83 FATIGUE: ICD-10-CM

## 2024-01-20 DIAGNOSIS — R63.4 WEIGHT LOSS: ICD-10-CM

## 2024-01-20 PROCEDURE — 87046 STOOL CULTR AEROBIC BACT EA: CPT

## 2024-01-20 PROCEDURE — 87427 SHIGA-LIKE TOXIN AG IA: CPT

## 2024-01-20 PROCEDURE — 87045 FECES CULTURE AEROBIC BACT: CPT

## 2024-01-20 PROCEDURE — 87493 C DIFF AMPLIFIED PROBE: CPT

## 2024-01-21 LAB — C DIFF TOX B STL QL: NEGATIVE

## 2024-02-27 NOTE — TELEPHONE ENCOUNTER
Patient states that she is almost out of the Dipheniatrop 2.5mg medication needs refill.     Current Outpatient Medications   Medication Sig Dispense Refill                                                            diphenoxylate-atropine 2.5-0.025 MG Oral Tab Take 1-2 tablets by mouth 3 (three) times daily as needed for Diarrhea. 60 tablet 5

## 2024-02-28 DIAGNOSIS — K58.0 IRRITABLE BOWEL SYNDROME WITH DIARRHEA: Primary | ICD-10-CM

## 2024-02-28 NOTE — TELEPHONE ENCOUNTER
Requested Prescriptions     Pending Prescriptions Disp Refills    DIPHENOXYLATE-ATROPINE 2.5-0.025 MG Oral Tab [Pharmacy Med Name: Diphenoxylate-Atropine 2.5-0.025 MG Oral Tablet] 60 tablet 0     Sig: TAKE 1 TO 2 TABLETS BY MOUTH THREE TIMES DAILY AS NEEDED FOR DIARRHEA     Possible duplicate: Doug to review recent actions on this medication  Lr: 8/21/2023            Qty:60 tablet       refills: 5  Lov: 5/27/2022

## 2024-03-01 RX ORDER — DIPHENOXYLATE HYDROCHLORIDE AND ATROPINE SULFATE 2.5; .025 MG/1; MG/1
1-2 TABLET ORAL 3 TIMES DAILY PRN
Qty: 60 TABLET | Refills: 5 | OUTPATIENT
Start: 2024-03-01

## 2024-03-01 RX ORDER — DIPHENOXYLATE HYDROCHLORIDE AND ATROPINE SULFATE 2.5; .025 MG/1; MG/1
1-2 TABLET ORAL 3 TIMES DAILY PRN
Qty: 60 TABLET | Refills: 3 | Status: SHIPPED | OUTPATIENT
Start: 2024-03-01

## 2024-06-06 RX ORDER — PANTOPRAZOLE SODIUM 40 MG/1
40 TABLET, DELAYED RELEASE ORAL
Qty: 90 TABLET | Refills: 3 | Status: SHIPPED | OUTPATIENT
Start: 2024-06-06

## 2024-06-06 NOTE — TELEPHONE ENCOUNTER
Requested Prescriptions     Pending Prescriptions Disp Refills    pantoprazole 40 MG Oral Tab EC 90 tablet 3     Sig: Take 1 tablet (40 mg total) by mouth every morning before breakfast.      Lr: 6/12/2023           Qty: 90 tablet          Refills:3  Lov: 5/27/2022  Clinic on call: Dr.Dharan Christiano Mcnulty

## 2024-07-16 ENCOUNTER — TELEPHONE (OUTPATIENT)
Facility: CLINIC | Age: 74
End: 2024-07-16

## 2024-07-17 NOTE — TELEPHONE ENCOUNTER
From: Yari Solano  To: Rajiv Cox MD  Sent: 8/16/2023  4:07 PM CDT  Subject: Medication     Hi Dr. Cox  Sorry to bother you again, while I’m waiting for my meds to arrive from [facility] I am running out of my Diphenatrop. (Anything stronger than Diphenatrop  the diarrhea just won’t stop!) if not can you please refill the prescription for me.  Thank you  Yari Solano

## 2024-07-17 NOTE — TELEPHONE ENCOUNTER
Dr Cox,    I spoke to Yari over the phone    She is struggling with chronic diarrhea again    We did schedule a f/u appt. Date time and [facility] location confirmed.    Her question for right now is, she took a course of Rifaximin in June (she receives the prescription from [facility]). She was doing fine until this past week and started back on the Lomotil. The Lomotil does not really improve the diarrhea    She is wondering if it is safe to take another round of Rifaximin so soon after the last course in June    I told her I would ask, but you may want her to repeat stool cultures as well    Her last set of stool cultures were 01/20/2024 ordered by her PCP Dr Wyatt    She is wondering about another medication that might work for her condition    I reviewed your last OV note & saw that you would consider giving her colestid. I spoke to her about this medication and explained it is a cholesterol medication used off label for chronic diarrhea. She is willing to try it. She remembers you talking about the Lotronex and would not be willing to try this medication    Your Appointments      Friday August 23, 2024 2:30 PM  Follow Up Visit with Rajiv Cox MD  [facility] ([facility]) [address] [address] [phone]

## 2024-07-27 RX ORDER — MONTELUKAST SODIUM 4 MG/1
TABLET, CHEWABLE ORAL
Qty: 60 TABLET | Refills: 11 | Status: SHIPPED | OUTPATIENT
Start: 2024-07-27

## 2024-07-27 NOTE — TELEPHONE ENCOUNTER
Thank you Johnathon so much for speaking with Yari.    So sorry to read this.    Negative C. difficile assay 1/20/2024 noted.    Yes, Colestid is a simple safe option.    It is safe and reasonable to take another course of the rifaximin now or whenever she chooses.  Rarely people take this on a monthly basis or more.  Really the only obstacle is cost.    I think it is worth giving the Colestid a try.  The starting dose is 1 horse pill once or twice daily, maximum dose 2 pills (2 g)  twice daily.  Rx sent in to [facility] for BID dosing.    Lifelong diarrhea most consistent with IBS-diarrhea probably aggravated by her cholecystectomy.  However, Yari had a mildly elevated fecal calprotectin level back in 2021.  We could repeat a fecal calprotectin if she wanted to look for inflammation and consider anti-inflammatory such as budesonide.    Thank you for setting up the follow-up visit in August.    - jazlyn

## 2024-07-31 NOTE — TELEPHONE ENCOUNTER
Thanks Johnathon!!    Agree with superb RN advice given.    I emailed Ms. Russ today as well.    - cb

## 2024-07-31 NOTE — TELEPHONE ENCOUNTER
Dr Cox,    I spoke to Yari    She just picked up the Colestid from her pharmacy    She states her PCP did not want her to take colestipol while being on ezetimibe    She thought it would effect her liver enzymes    I told the pt to take the colestipol 4 hours before or 2 hours after ezetimibe.    I let Yari know I would let you know her concerns    She also MyChart messaged you back about this    I also told her she can start with one pill daily and go up to BID dosing if she needs to

## 2024-08-13 ENCOUNTER — TELEPHONE (OUTPATIENT)
Facility: CLINIC | Age: 74
End: 2024-08-13

## 2024-08-13 NOTE — TELEPHONE ENCOUNTER
Pt recently started colestipol for chronic diarrhea two weeks ago    She had to stop the colestipol one week ago due to severe constipation    Finally after taking some Miralax and dulcolax she had a bowel movement    She spoke to the pharmacist and he recommended a daily stool softener and fiber    She is currently taking two stool softeners daily and a powder fiber supplement Metamucil    She is drinking as much water as she can    We did discuss the signs and symptoms of a bowel obstruction but she did not experience any symptoms such as abdominal pain/distension, fever, she is able to pass gas    Pt wondering if she can start back on the colestipol and how should she take it    She has a follow up appt with you in [place] on 08/23/2024

## 2024-08-15 NOTE — TELEPHONE ENCOUNTER
I spoke to the pt and reviewed Dr Cox's below recommendations with her    She will see Dr Cox on 08/23/2024 in [place]

## 2024-08-23 ENCOUNTER — TELEPHONE (OUTPATIENT)
Dept: GASTROENTEROLOGY | Facility: CLINIC | Age: 74
End: 2024-08-23

## 2024-08-23 ENCOUNTER — OFFICE VISIT (OUTPATIENT)
Dept: GASTROENTEROLOGY | Facility: CLINIC | Age: 74
End: 2024-08-23
Payer: MEDICARE

## 2024-08-23 VITALS
BODY MASS INDEX: 26.43 KG/M2 | WEIGHT: 136.38 LBS | HEIGHT: 60.25 IN | SYSTOLIC BLOOD PRESSURE: 106 MMHG | HEART RATE: 66 BPM | DIASTOLIC BLOOD PRESSURE: 67 MMHG

## 2024-08-23 DIAGNOSIS — K86.2 PANCREAS CYST (HCC): ICD-10-CM

## 2024-08-23 DIAGNOSIS — K58.0 IRRITABLE BOWEL SYNDROME WITH DIARRHEA: Primary | ICD-10-CM

## 2024-08-23 PROBLEM — R10.11 ABDOMINAL PAIN, RIGHT UPPER QUADRANT: Status: RESOLVED | Noted: 2022-02-04 | Resolved: 2024-08-23

## 2024-08-23 PROCEDURE — 99214 OFFICE O/P EST MOD 30 MIN: CPT | Performed by: INTERNAL MEDICINE

## 2024-08-23 RX ORDER — BUTALBITAL, ASPIRIN, CAFFEINE AND CODEINE PHOSPHATE 50; 325; 40; 30 MG/1; MG/1; MG/1; MG/1
1 CAPSULE ORAL
COMMUNITY
Start: 2024-05-16

## 2024-08-23 NOTE — TELEPHONE ENCOUNTER
Reza Diego, not sure if there is already a recall in the system for next MRI scan.    Could you update otherwise place a new recall to call Yari on/after 3/17/2025 to schedule her next MRI abdomen/pancreas for mid April 2025?  She has a pancreatic cyst that we are watching.  She needs open MRI with IV contrast which apparently only can be done at the [facility] imaging facility.    Open MRI with IV contrast at [facility], follow-up of cystic pancreatic lesion    - cb

## 2024-08-23 NOTE — PROGRESS NOTES
**  SCROLL DOWN FOR HPI **      ASSESSMENT/PLAN:       74 year old woman with history of Type 2 diabetes, hypertension, dyslipidemia who presents today to transition care to our GI practice.    Ms. Solano describes a 50-year history of chronic diarrhea.  This has been severe since age 20.  On bad days, she can have over 5 episodes of diarrhea per day including awakening overnight for nocturnal diarrhea episodes.    There has been and can be full fecal incontinence with these episodes.  Diarrhea can be uncontrollable.    Over the past 50 years, Ms. Solano describes evaluation and hospitalizations for `colitis,' diverticulitis, irritable bowel syndrome, `ulcerative colitis.'  She was hospitalized for the colitis for 14 days apparently when she was very young, diagnosed and hospitalized with diverticulitis somewhere in her 50s.    Also has been following an incidental 1.7 cm pancreatic cystic lesion with serial imaging.    It does not sound like there has been a consistent diagnosis over the years.  No recent steroid use or recalled concern for inflammatory process such as inflammatory bowel disease.    Previously and consistently relieved by rifaximin antibiotic as below.    Of note, Ms. Solano underwent cholecystectomy surgery in 1994.  She does not believe that the diarrhea worsened significantly after the surgery.    Recent measures that have helped:  Gluten-free diet as directed by an alternative medicine practitioner has given her great improvement past 2 years.  Still occasional flareups but much better than before.  Avoids dairy  Takes Lomotil as an abortive therapy when these episodes occur.  This gives relief but causes constipation which can last for several days.  No such effect from Imodium.  Imodium does not work.  Repeated courses of rifaximin antibiotic have been very helpful since 2015.  Previous doctors have given her free samples.  She took several courses from 2015 onwards, including 2 courses in  February 2021 for what sounds like a fairly severe flare of the diarrhea.  Took a full of 28 days of the rifaximin in February 2021 and the diarrhea persisted through March.    Diarrhea can be uncontrollable.  However, no associated abdominal cramping.    Repeated evaluations including celiac testing, colonic biopsies, stool tests including as below have been reassuring.  Recent fecal calprotectin of 2/23/2021 was elevated at 155.    Multiple colonoscopy examinations have been performed, most recently February 2020, February 2019, 2014 as copied above.    Again reports subjective improvement on a course of rifaximin antibiotic taken approximately September 2021.    Returns for follow-up 2/20/2022 regarding hospitalization 2/4/2022 above regarding right upper quadrant pain suspicious for biliary colic with very impressive spike in LFTs on labs of 2/3/2022 above.  LFTs immediately improved, faster than one would expect with a drug toxicity.  Question of biliary event versus pravastatin statin toxicity.    Returns for follow-up 5/27/2022 overall doing very well after MRI MRCP and then EUS of pancreatic lesion February and March 2022.  Incidental finding is significant reflux esophagitis on the endoscopy exams led to a prescription of pantoprazole which has helped dramatically with her previous dysphagia symptoms.    ERCP and sphincterotomy performed 12/27/2022.    Returns for follow-up 8/23/2024 much better with regards to the previous colicky upper abdominal pains, but frustrated with the chronic diarrhea and recent severe constipation/ileus on a trial of colestipol medication.    Suggest:    Irritable bowel syndrome with diarrhea, rare episodes full fecal incontinence, ?mild elevation fecal calprotectin?    Lifelong history of nonbloody diarrhea, lack of alarm signs highly suggestive of dietary intolerance, functional process.  History of cholecystectomy surgery 1994 with no recalled change in bowel patterns.  May  have been trialed on cholestyramine resin which tasted awful and could not tolerate  Not a candidate for Viberzi medication due to cholecystectomy  See summary recent labs above  Elevated fecal calprotectin level stool assay of 2/23/2021 a bit atypical; 2014 random colonic biopsies were entirely normal  Multiple colonoscopy examinations have been performed as above including 2014, February 2019, February 2020  Significant improvement on gluten-free diet  Some relief from Lomotil but not Imodium.  Lomotil gives relief but then causes significant constipation.  No abdominal cramping with this diarrhea.  Therefore does not recall trial of dicyclomine/hyoscyamine antispasmodics.  Could consider in the future.    Rifaximin has worked for her each time she has turned to it, previously September 2021.  Ultimately filling this medication from [facility] with acceptable cost.  Diarrhea recently acting up previous visit 2/28/2022 and I advised Ms. Solano to proceed with another 5 - 14 day course.  New trial of Colestid tablets 1 g twice daily prescribed 7/27/2024.  When taken once daily for just 1 week this caused constipation and complete ileus/shut down.  3 weeks later, bowel movements are just resuming on follow-up visit 8/23/2024.  Today 8/23/2024 we discussed cautious titration of the Colestid, taking it just once weekly increasing it to 2 or 3 doses per week and seeing if that can moderate the chronic diarrhea.  Otherwise gets good relief from Lomotil as above.      2.  Incidental cystic pancreatic lesion ?2017  Initial documented ocurrence in the Epic record was MRI scan June 2017 (21 mm).  Recent CT scan 3/20/2021 describes stable 1.7 cm pancreatic cystic lesion when compared to November 2019 exam.  Ms. Solano has undergone EUS exam with cyst aspiration July 2017 which was reassuring:  Previous EUS and FNA performed Basil Erickson MD 7/27/2017: \"This cyst contained a single septum. The cyst measured 1.6 cm x 1.4  cm. \"  Incidentally re-evaluated with CT scan and MRI scan as per above hospitalization of 2/4/2022.  Question of slight interval enlargement of the pancreatic cyst and internal septation.  With question of changes, 5 years since previous EUS evaluation I recommended further evaluation with Dr. Sp Chu of our group for EUS procedure.    Reassuring EUS and FNA sampling of pancreatic lesion as above 3/28/2022.  At least one of the fluid studies was indeterminant.  1 year follow-up MRI scan reassuring 4/10/2023  Follow-up MRI study on/after 4/10/2025    3.  Incidental finding pancreatic divisum on imaging     Uncertain significance.  No history of abdominal pain with this chronic watery diarrhea.  I explained this redemonstrated incidental finding during office visit of 2/28/2022.  Ms. Solano denies previous history of acute pancreatitis or recurrent pancreatitis.  I advised that this is likely an incidental finding of no clinical significance.  Repeatedly normal fecal pancreatic elastase levels as above  Possible pancreas divisum by the EUS of pancreatic and common bile duct 3/28/2022 above.  Resolution of previous intense colicky epigastric and RUQ pain after ERCP/spincterotomy procedure below 12/27/2022.    4.  Recurring colicky RUQ pain, impressive spike in LFTs 2/3/2022 as above  Very distant history of emergency cholecystectomy surgery approximately 30 years ago.  No known issues since then with choledocholithiasis or abnormal biliary tree on imaging  See recent MRI MRCP describing 7 mm CBD, normal pancreatic duct above  CBD was evaluated, found to be unremarkable during recent EUS exam 3/28/2022 above.  Likely pancreas divisum as above.  Resolution of previous intense colicky epigastric and RUQ pain after ERCP/spincterotomy procedure below 12/27/2022.  Possible component of small stone, biliary sludge.    5.  Reflux esophagitis with mucosal erosions on EGD examination 3/28/2022; long history of  dysphagia and food impaction symptoms  2 months later, Ms. Solano describes dramatic improvement on once daily pantoprazole prescription prescribed 3/28/2022.  No further dysphagia or food impaction events.  Continue indefinite PPI therapy at this point.    6.  Colon cancer screening, average risk  Repeatedly reassuring colonoscopy examinations including February 2019 and February 2020  Not likely due for screening colonoscopy examination until February 2027-February 2030          Prior to this encounter, I spent over 10 minutes preparing for the visit, including reviewing documents from other specialties as well as from PCP and going over test results. During and after the face to face encounter, I spent an additional 40 minutes discussing the above and counseling this patient, reviewing chart notes and data with patient and composing this note.      Digital transcription software was utilized to produce this note. The note was proofread for content only. Typographical errors may remain.       Office visit 8/23/2024:   HPI:    Patient ID: Yari Solano is a ela 74 year old woman with history of Type 2 diabetes, hypertension, dyslipidemia who returns today for follow-up regarding recent symptoms, impressive spike in LFTs February 2022, and recent US and sampling of pancreatic cystic lesion Dr. Chu 3/28/2022.    After last time, I performed ERCP and sphincterotomy December 2022 for these worsening colicky pain episodes, so far with resolution of the pain ever since.  Great news.    Family is doing well.  Alcides and Marianne are in [place] at an [employer].  Marianne working as a , Alcides took courses at [employer], now working at [employer] in [place].    Ms Solano had called us regarding chronic diarrhea.  She does take Lomotil as needed with relief.  As per previous plan, I recommended and prescribed colestipol 7/27/2024.  Took colestipol for one week (once daily).  The colestipol caused  severe constipation.  No pain; just complete ileus.  She stopped after about 1 week.  Miralax and stool softeners didn't relieve.    Last dose colestipol approx 3 weeks ago.  Seems to be just now wearing off.  Previous bowel patterns and previous diarrhea seem to be resuming.    Overall, Yari looks great today.  We reviewed all of the above and talked about considering careful titration of the colestipol.  ======================  Previous visit 5/27/2022:     Yari Solano is a ela woman with history of Type 2 diabetes, hypertension, dyslipidemia who returns today for follow-up regarding recent symptoms, impressive spike in LFTs February 2022, and recent US and sampling of pancreatic cystic lesion Dr. Chu 3/28/2022.    Fluid was sent for testing including CEA, glucose level, amylase.  Results including CEA level of 12.4, low glucose level of < 1 mg/dL were inconclusive.    Dr. Chu recommended repeat MRI MRCP in 1 year.    Dr. Chu discovered significant reflux esophagitis distal esophagus during the exam.  He prescribed pantoprazole 3/28/2022.    Ms. Solano denies any recent history of GERD symptoms.  She does however describe a very long history of dysphagia and food impaction events.  She describes history of food getting stuck in the substernal region, needing to get up and go and retch, vomit food up.    Dysphagia has entirely resolved on the new pantoprazole medication.  Certainly no further food impaction events.    Off previous statin therapy as per previous notes.  Now on Zetia cholesterol therapy.  Also getting that from [facility].    Very very happy about improvement on the pantoprazole.    Previous rifaximin prescription taken on a PRN basis continues to give good relief when the IBS-diarrhea acts up.  125 tablets of rifaximin from [facility] currently cost her $300 which she states is tolerable.    Nothing new with the chronic diarrhea.  This is tolerable at current  baseline.  Takes Imodium regularly, several days per week as well as Lomotil when the diarrhea acts up.  She estimates 15 pills/month of the Lomotil.  Also takes occasional dose of tramadol for the intermittent upper abdominal pain.  Needs new prescriptions.    ====================  Previous visit 2/28/2022:     Yari Solano is a ela 71 year old woman with history of Type 2 diabetes, hypertension, dyslipidemia who returns today with her very supportive daughter for follow-up.    Ms. Solano presented to the ED and hospitalized 2/4/2022 - 2/6/2022 for approximately 2-month history of right upper quadrant abdominal pain which abruptly became severe.  She was found to have abnormal elevated hepatic transaminases.  She improved with hospitalization, holding outpatient medications including topiramate, statin medication herbal supplements.  LFTs rapidly trended down by the following day, somewhat inconsistent with medication toxicity.      Labs 2/3/2021 showed: AST 1630 ALT 1162 alk phosphatase 196 total bilirubin 0.89  Labs 2/4/2022 showed:   alk phosphatase 173  Labs 2/6/2022 showed:  AST 41  alk phosphatase 129 total bilirubin 0.4  Labs 2/26/2022 showed:  AST 10 ALT 38 alk phosphatase 91 total bilirubin 0.5    Previous LFTs here 7/1/2021 showed AST 12 ALT 12 alk phosphatase 66.    Ms. Solano suspects statin toxicity.  All other medications have been consistent for at least 10 years maybe more.  She was prescribed pravastatin medication in May and June 2021, but did not start it until approximately September 2021.  As she had observed, her previous CMP 7/1/2021 was normal, then nothing in the months immediately after starting the pravastatin in September 2021.  Next LFTs 2/3/2021 were profoundly elevated as above.    Pravastatin was held on admission and she stayed off it.  She has resumed her previous topiramate migraine prophylaxis medication baseline antihypertensive  medications.    Starting Zetia lipid-lowering therapy as soon as shipment arrives from [facility].    Today, Ms. Solano and her daughter accompanying give a history of recurring colicky pain.    They describe an intense recurring right upper quadrant pain at least the past 2-3 months which seems to come at random, unpredictable.  It has happened at home, after eating, recently during her friend's  service.  Pain can be severe, disabling.  It resolves almost immediately with a single dose of tramadol.    The pain leading to ED presentation of 2022 was one of the worst yet.  Initially, it did not respond to the tramadol as per usual.  By the time she got into the ER however, the pain was resolving.  She believes that she had no pain after admission.  LFTs rapidly improved as above.    Regarding the previous diarrhea, a course of rifaximin antibiotic 2021 apparently purchased through [facility] did give significant relief.  She was very happy with that.  Diarrhea has been more active past 3 weeks and today Ms. Solano asks if safe to take another course of the rifaximin.  She got a fairly large volume of the medication from [facility].    ====================  Previous visit 2021:     Yari Solano is a ela 71 year old woman with history of Type 2 diabetes, hypertension, dyslipidemia who presents today to transition care to our GI practice.    Ms. Solano describes a 50-year history of chronic diarrhea.  This has been severe since age 20.  On bad days, she can have over 5 episodes of diarrhea per day including awakening overnight for nocturnal diarrhea episodes.    There has been and can be full fecal incontinence with these episodes.  Diarrhea can be uncontrollable.    Over the past 50 years, Ms. Solano describes evaluation and hospitalizations for `colitis,' diverticulitis, irritable bowel syndrome, `ulcerative colitis.'  She was hospitalized for the colitis for 14 days apparently when she was  very young, diagnosed and hospitalized with diverticulitis somewhere in her 50s.    Also has been following an incidental 1.7 cm pancreatic cystic lesion with serial imaging.    It does not sound like there has been a consistent diagnosis over the years.  No recent steroid use or recalled concern for inflammatory process such as inflammatory bowel disease.    Of note, Ms. Solano underwent cholecystectomy surgery in 1994.  She does not believe that the diarrhea worsened significantly after the surgery.    Recent measures that have helped:  Gluten-free diet as directed by an alternative medicine practitioner has given her great improvement past 2 years.  Still occasional flareups but much better than before.  Avoids dairy  Takes Lomotil as an abortive therapy when these episodes occur.  This gives relief but causes constipation which can last for several days.  No such effect from Imodium.  Imodium does not work.  Repeated courses of rifaximin antibiotic have been very helpful since 2015.  Previous doctors have given her free samples.  She took several courses from 2015 onwards, including 2 courses in February 2021 for what sounds like a fairly severe flare of the diarrhea.  Took a full of 28 days of the rifaximin in February 2021 and the diarrhea persisted through March.    Diarrhea can be controllable.  However, no associated abdominal cramping.    Repeated evaluations including celiac testing, colonic biopsies, stool tests including as below have been reassuring.  Recent fecal calprotectin of 2/23/2021 was elevated at 155.    Multiple colonoscopy examinations have been performed, most recently February 2020, February 2019, 2014 as copied below.    No alcohol consumption.  Quit previous smoking habit 35 years ago    Records provided and reviewed today:    Stool studies 2/23/2021:  Elevated fecal calprotectin 155.1 mg/kg  Pancreatic fecal elastase 453 normal  C. difficile negative  Stool PCR panel for infectious  pathogens negative    Pancreatic fecal elastase sample of 3/13/2021 was 612 normal    Operative report of colonoscopy examination 2020 Dr. Saleem Camposp:  Conscious sedation  Colonoscopy examination to the cecum  Adequate prep quality  Hemorrhoids only    Wt Readings from Last 20 Encounters:   24 136 lb 6.4 oz (61.9 kg)   22 143 lb (64.9 kg)   22 143 lb (64.9 kg)   22 143 lb 12.8 oz (65.2 kg)   22 144 lb (65.3 kg)   22 145 lb (65.8 kg)   22 145 lb (65.8 kg)   22 147 lb 11.2 oz (67 kg)   22 144 lb 3.2 oz (65.4 kg)   21 148 lb (67.1 kg)   21 147 lb 6.4 oz (66.9 kg)   21 139 lb (63 kg)   21 139 lb (63 kg)   21 144 lb (65.3 kg)   21 139 lb (63 kg)   03/15/21 138 lb (62.6 kg)   21 142 lb (64.4 kg)   20 147 lb (66.7 kg)   20 147 lb (66.7 kg)   20 152 lb (68.9 kg)         Previous EGD examination: ?  Previous colonoscopy(ies):     [facility]    Review of Systems    ====================    KELLIE MUSE                    :  48                                           MICROBIOLOGY                          SOURCE: BLOOD                    ACCESSION:                                                             Verified:2014 12:30        Test Name: [facility] CELIAC SEROLOGY  1155    Location History: [address], [address]                                                            Verified:2014 12:30        Reference Lab: [facility]    Location History: [address], [address]                                                            Verified:2014 12:30        Result: RESULT DO NOT SUPPORT A DIAGNOSIS OF CELIAC DISEASE    Result Comment: SEE SEPARATE REPORT FAXED TO PHYSICIAN OFFICE FOR FURTHER INFORMATION.           ====================  [facility]     MRI PANCREATIC CYST 3T (W+WO) (CPT=74183)  06/23/2017    Leon Bridges DO - 06/23/2017    MRI PANCREATIC CYST WITH AND WITHOUT IV CONTRAST    CLINICAL INFORMATION:  Cyst of pancreas.    COMPARISON STUDY: None.    ADVERSE REACTIONS: None.    IMAGING PROTOCOL:  Nonenhanced and enhanced multiplanar MR sequences of the abdomen were obtained, along with  nonenhanced MRCP sequences, using a dedicated high-resolution pancreatic cyst MR protocol, including  high-resolution focused-FOV multiphasic postcontrast T1-weighted axial images of the pancreatic  gland, utilizing a closed 3-Asia multichannel MR system.    Vial size: 10 mL  Injected amount: 7.5 mL  Ingested amount: 1 mL  Discarded amount: 1.5 mL    FINDINGS:  The T2-weighted sequences demonstrate an approximately 2.1 x 1.4 x 0.9 cm (craniocaudal, AP and  transverse dimensions, respectively) homogeneously hyperintense lesion in the posterior pancreas, at  the junction of the body and tail, representing a cystic morphology which may contain at least one  very thin internal septation. 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    There is no pancreatic ductal dilatation. The maximum diameter of the pancreatic duct at the level  of the tail, body and head are 1.7 mm, 3.2 mm and 3.5 mm, respectively.    The common bile duct is somewhat distended and measures up to 8 mm which is probably related to  prior cholecystectomy.    No intrahepatic biliary ductal dilatation is demonstrated.    The distal aspect of the pancreatic duct is anterior to the intrapancreatic portion of the common  bile duct and apparently drains into the duodenum via a separate orifice (duct of Santorini and  minor ampulla, respectively). There appears to be an additional smaller duct,  draining the  pancreatic head (pancreatic duct of Wirsung), which joins the distal intrapancreatic portion of the  common bile duct and drains into the duodenum via the major ampulla. The constellation of the  findings indicates morphologic changes of pancreas divisum.    The postcontrast images also show no abnormally enhancing lesions within the imaged area of the  abdomen. The above-mentioned pancreatic cyst does not show significant peripheral or central  enhancement.    The visualized segments of the liver, adrenals and spleen appear normal.    The pancreatic duct is also normal in caliber.    The kidneys show no hydronephrosis or perinephric collections.    No other mass lesions are seen within the abdomen.      IMPRESSION:    1. 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    2. Pancreas divisum.    3. Status post cholecystectomy.      Exam End: 06/23/17  6:13 PM     Received From: [facility]    ====================    03/20/2021  CT ABDOMEN PELVIS W WO CONTRAST     CLINICAL INDICATION: 70-year-old female, pancreatic cyst.     COMPARISON:  CT abdomen/pelvis with without contrast from 12/10/2020. CT   abdomen/pelvis from 11/14/2019.     TECHNIQUE: Axial CT images were obtained to the abdomen before and after   administration of 100 mL Omnipaque 350 IV contrast. Multiplanar reformats   were created. Delayed phase scans through the abdomen/pelvis were obtained.   mA and/or kVp was adjusted for patient size.     FINDINGS:     No pleural effusions. Heart size within normal limits, no pericardial   effusion.     No enhancing lesions identified within the liver. Cholecystectomy changes.   Normal appearance of the intrahepatic and extra hepatic bile ducts.     Patent portal veins, splenic vein and visualized mesenteric veins.     Distal pancreatic body/tail cystic lesion measures 1.7 cm stable in size   and appearance when  compared to 12/10/2020. This again appears to   communicate with the distal pancreatic duct.  Findings again may represent a   pancreatic pseudocyst versus cystic neoplasm such as IPMN. Redemonstrated   findings of pancreatic divisum. No other pancreatic lesions identified.     Spleen and adrenal glands appear within normal limits.     No calcified or obstructing or tract stones identified. No hydronephrosis.   Stable fatty 9 mm lesion in the anterior midportion of the left kidney   likely an angiomyolipoma. Additional simple appearing exophytic cyst   arising from the midportion of the left kidney measures 1.2 cm. These   appear stable from 12/10/2020.     Oral contrast partially opacifies the partially visualized colon. Retained   fecal material in the partially visualized colon. No obvious evidence of   bowel obstruction on the provided images. Sigmoid colon is mostly collapsed   and unopacified, limiting evaluation.     Nonspecific subcentimeter short axis retroperitoneal and mesenteric lymph   nodes are present, similar to prior exam.     Bladder not fully distended, limiting evaluation. Nonvisualized   uterus/adnexa, likely surgically absent.     Multilevel degenerative endplate changes degenerative disc disease and   osteophytosis the visualized spine. No acute fractures or dislocations   identified.     IMPRESSION:     No significant interval changes since 12/10/2020.     1. Stable 1.7 cm cystic lesion in the distal pancreatic body which appears   to communicate with the distal pancreatic duct. This again may represent   pseudocyst versus a pancreatic cystic neoplasm such as IPMN. Continued   imaging surveillance and/or evaluation with US advised.     2. Stable findings of pancreatic divisum.     3. Small left renal angiomyolipoma, stable.         See above for additional findings/observations.     Electronically Signed by: HYACINTH CASTREJON DO   Signed on: 3/20/2021 1:00 PM     ====================  2/04/2022  : CT ABDOMEN PELVIS IV CONTRAST NO ORAL (ER)       COMPARISON: None.       INDICATIONS: Elevated liver enzymes.  Severe right upper quadrant pain.       TECHNIQUE: CT images of the abdomen and pelvis were obtained with non-ionic intravenous contrast material.        FINDINGS:   LIVER: Liver is diffusely hypoattenuating compared to the spleen, probable hepatic steatosis.  Liver is also mildly enlarged.  No focal suspicious appearing hepatic abnormality noted.  Portal vein is patent.   GALLBLADDER: Surgically absent.   BILIARY: Mild prominence of the CBD at 5-7 mm is likely related to post cholecystectomy state.  CBD appears smooth without any areas of narrowing or calcification along the CBD.  No intrahepatic biliary ductal dilatation is noted.   SPLEEN: No enlargement or focal lesion.     STOMACH: There is a tiny hiatal hernia.  Stomach is otherwise grossly unremarkable duodenum is grossly unremarkable.   PANCREAS: No inflammation or pancreatic ductal dilatation noted.  There is an oval cystic lesion within the posterior aspect of the pancreatic tail with a single thin internal septation that appears to be enhancing.  This lesion measures 13 x 11 x 24 mm (transverse by AP by craniocaudal).  No definite connection to the adjacent pancreatic duct.  Another lesion is seen within the neck of the pancreas measuring 8 mm (series 2, image 49), fat density, probably an area of interdigitated mesenteric fat.   ADRENALS: No defined mass or abnormal enlargement.     KIDNEYS: There is an 11 mm lesion within the anterior aspect of the left lower renal pole, fat density, compatible with an angiomyolipoma.  An exophytic 1.5 cm lesion at the left kidney interpolar region measures simple fluid density by Hounsfield units,  compatible with a cyst. Additional scattered subcentimeter hypodensities in the kidneys are too small to fully characterize but statistically likely to reflect additional cysts.  Kidneys are negative for  hydronephrosis and demonstrates symmetric enhancement.   AORTA/VASCULAR:   Mild calcific atherosclerosis.  No aneurysm or dissection.   RETROPERITONEUM: No mass or enlarged adenopathy.     BOWEL/MESENTERY: There is a moderate amount of stool throughout the colon.  Diverticular disease is seen involving the distal descending and sigmoid colon without focal inflammatory change to suggest diverticulitis.  Some diverticula of the cecum are also noted.  Appendix is not discretely visualized, compatible with patient's known history of prior appendectomy.  Small bowel loops are normal in caliber without dilated loops to suggest obstructive process.   ABDOMINAL WALL: There is a tiny fat containing umbilical hernia.  There is a tiny fat containing right and small to moderate fat containing left inguinal hernia.  A small area of skin induration which is somewhat linear is noted in the subcutaneous fat of the left inguinal region (series 2, image 134) may be related to prior surgery or vascular access.   URINARY BLADDER: No visible focal wall thickening, lesion or calculus.     PELVIC NODES: No enlarged mass or adenopathy.     PELVIC ORGANS: Patient is status post hysterectomy.  Adnexal tissue is still present bilaterally.  No gross abnormality is noted.   BONES:   Mild osteoarthritis of the lower lumbar spine facet joints and sacroiliac joints.  Bones appear diffusely demineralized.  Mild-to-moderate degenerative disc changes at the lower lumbar spine.  There is a well-defined peripherally thinly sclerotic 1.6 cm lucent lesion within the anterior aspect of the L4 vertebral body on the right, with a somewhat corduroy appearance of the internal contents, most likely a vertebral body hemangioma.  No suspicious appearing osseous lesions are demonstrated.   LUNG BASES: Mild curvilinear opacities in the lung bases are probably combination of subsegmental atelectasis and scarring.   OTHER: Coronary artery calcifications are  partially visualized.           CONCLUSION:       1. Enlarged liver with hepatic steatosis.       2. Postsurgical changes of cholecystectomy, appendectomy, and hysterectomy.       3. Colonic diverticulosis without evidence for acute diverticulitis.       4. Cystic lesion in the tail of the pancreas with thin internal likely enhancing septation, lesion measures 13 x 11 x 24 mm.  Follow-up imaging of this lesion with contrast-enhanced MR/MRCP of the abdomen is advised for further characterization.       5. Diffuse bone demineralization.  Lucent lesion in the L4 vertebral body.  This lesion appears most likely benign in the absence of a known history of underlying malignancy or multiple myeloma most likely represents a vertebral body hemangioma.       6. Additional incidental findings as outlined above.             Dictated by (CST): Elli Villatoro MD on 2/04/2022 at 1:17 PM       ====    2/06/2022 : MRI MRCP W/3D ONLY (CPT=76376/90422)   MRCP         COMPARISON: [facility], CT ABDOMEN PELVIS IV CONTRAST NO ORAL (ER), 2/04/2022, 1:02 PM.       INDICATIONS: LFTs elevated       TECHNIQUE: A comprehensive examination was performed utilizing a variety of imaging planes and imaging parameters to optimize visualization of suspected pathology.  Images were obtained without contrast.           MRCP FINDINGS:     GALLBLADDER:   Gallbladder surgically absent.   BILE DUCTS:   No evidence of intrahepatic or extrahepatic biliary ductal dilatation.  The common bile duct measures up to 0.6 cm.  No evidence of focal narrowing or filling defect.  Low insertion of cystic duct remanent is noted.   PANCREAS:   There is a cystic lesion pancreatic tail which measures approximately 1.5 x 0.8 x 2.0 cm.  This appears to have some thin internal septation.  This also is suspected to communicate with the nondilated pancreatic duct.  Possible pancreas divisum.         OTHER FINDINGS:   LIVER: Mild loss of signal on the out of phase  gradient echo sequence consistent with steatosis.   SPLEEN: Unremarkable.   ADRENALS: Unremarkable.   KIDNEYS:   No evidence of hydronephrosis.  1.5 cm exophytic interpolar left renal cyst noted.  There is also a fat containing lesion lower pole left kidney measuring 1.1 cm consistent with a angiomyolipoma.   VASCULAR: Visualized abdominal aorta is normal in caliber.   ADENOPATHY:   No bulky abdominal adenopathy.   ASCITES: None.     BOWEL: Visualized bowel is nondilated.   LUNG BASES: No significant abnormality.       CONCLUSION:       Cholecystectomy.  No evidence of intrahepatic or extrahepatic biliary ductal dilatation.       2 cm cystic lesion pancreatic tail which appears to communicate with the nondilated main pancreatic duct.  Please note the characterization is limited without intravenous contrast but this may reflect a side branch IPMN.  Follow-up nonemergent contrast enhanced MRCP can be performed when clinically appropriate.       Possible pancreas divisum.       Hepatic steatosis.       Small left renal cyst and left renal angiomyolipoma.       Lesser incidental findings as above.               Dictated by (CST): Braden Boston MD on 2/06/2022 at 9:51 AM         ======================  Outside MRI report scanned in under media tab:  Open MRI performed at [facility], only option for open MRI with IV contrast    MRI MRCP abdomen with and without contrast  [facility]  4/10/2023    \"Gallbladder/biliary: Prior cholecystectomy.  Mild central biliary prominence, likely related to prior cholecystectomy.  This is within normal limits for age and postcholecystectomy state, common bile duct measuring up to 8 mm.  No dilation of the main pancreatic duct.  No obstruction identified.  Pancreas: Simple appearing cyst in the pancreatic tail measuring up to 1.9 cm. ...    IMPRESSION:  1.9 cm cyst in the pancreatic tail without suspicious features\"    ======================    [facility]  [address] [address]    PATIENT NAME: KELLIE MUSE  YOB: 1950  MRN: [id number]  ATTENDING PHYSICIAN: Saleem Lemos M.D.  ROOM:    02/28/2014    PREOPERATIVE DIAGNOSIS(ES): Evaluation of diarrhea and screening.    POSTOPERATIVE DIAGNOSIS(ES): Moderate internal hemorrhoids, rare diverticular  disease with no evidence of polyps. Random biopsies were taken for  microscopic evidence of microscopic colitis.    OPERATION: Colonoscopy.    SURGEON: Saleem Lemos M.D.    INDICATIONS: Evaluation of diarrhea and screening.    PREMEDICATION GIVEN: 10 of Versed and 100 of fentanyl given IV.    DESCRIPTION OF PROCEDURE: After informed consent was obtained, the risks and  benefits of the procedure were explained to the patient including, but not  limited to risk of bleeding, perforation, complication of sedation, and  possibility of missed lesions. The patient agreed to these risks and  benefits. She was placed in the left lateral decubitus position. Colonoscope  was passed into the cecum, good visualization.    FINDINGS: Cecum, ascending, transverse, descending, and sigmoid colon  appeared to be normal except for rare diverticular disease with the rectum  showing internal hemorrhoids. Random biopsies of the colon were taken for  evidence of microscopic colitis. Colonoscope was withdrawn. The patient  tolerated the procedure well.    IMPRESSION: Internal hemorrhoids and rare diverticular disease, otherwise  normal exam treatment.    PLAN: Await biopsies for evaluation of microscopic colitis. Review biopsy in  the outpatient setting. Make further recommendations at that time.    Saleem Lemos M.D.    Author ID: [id number]  MedQ / RICN: [id number] / Job#: [id number]  D: 02/28/2014 17:02:40  T: 03/01/2014 03:49:22    cc: Alicia Osuna M.D.      Electronically Signed On 03/10/2014 08:30 AM  __________________________________________________   SALEEM HAIDER  MD      PATH:    A. Colon, random biopsies:    -  Fragments of colonic mucosa with no specific histopathologic abnormality.        TC-2        Forest Sequeira M.D.        ====================    Op Note - Jewels Erickson MD - 07/27/2017 12:52 AM CDT    [address] [address]    PATIENT NAME: KELLIE MUSE  YOB: 1950    DATE OF SURGERY: 07/26/2017    PREOPERATIVE DIAGNOSIS(ES): A very pleasant 67-year-old lady with a history  of pancreatic cyst that is enlarging in size apparently.    POSTOPERATIVE DIAGNOSIS(ES): Cyst in the body of the pancreas, fine-needle  aspiration x1 pass performed, about just over 1 mL of thick viscid fluid  aspirated, sent for CEA, amylase, and cytology.    OPERATION: Endoscopic ultrasound-guided fine-needle aspiration.    SURGEON: JEWELS ERICKSON M.D.    MEDICATION: Monitored anesthesia care.    DESCRIPTION OF PROCEDURE: With the patient lying in left lateral position, an  Olympus linear echoendoscope was advanced into the esophagus onwards into the  stomach. Neck, body, and tail of pancreas were examined from the stomach.  Head of pancreas uncinate from the duodenum. The pancreas was generally  unremarkable without any changes of chronic pancreatitis or calcification.  Pancreatic duct was normal in diameter. In the body tail junction area, a  cystic lesion was noted. This cyst contained a single septum. The cyst  measured 1.6 cm x 1.4 cm. No mural nodules were seen. No wall thickening was  noted. Only single septum was noted. The cyst was punctured using 22-gauge  HD FNA needle. One pass was made. About just over 1 mL of viscid fluid was  aspirated, which was total of 1.2 mL fluid. Slightly blood tinged. This was  placed all in 1 bottle and sent for CEA, amylase, and cytology. Air was  suctioned out. Scope withdrawn.    RECOMMENDATION: Followup with Dr. Saleem Lemos in approximately 3 weeks.    JEWELS ERICKSON M.D.    Author ID:  [id number]  [facility] / IJN: [id number] / Job#: [id number]  D: 2017 12:16:31  T: 2017 00:52:14    cc: MERRILL Reyes M.D.      Electronically Signed On 10/11/2017 08:51  __________________________________________________   JEWELS COLLINS    ====================    Esophagogastroduodenoscopy (EGD) & Endoscopic Ultrasound (EUS) Report           Yari ROMEO Denisekumarjanice      7/3/1950 Age 71 year old   PCP Nahed Wyatt MD Endoscopist Sp Chu MD      Date of procedure: 22     Procedure: EGD w/ biopsy and EUS esophagus, stomach, and duodenum with guided FNA     Pre-operative diagnosis: pancreas cyst; upper abdominal pain, abnormal MRCP     Post-operative diagnosis: pancreas cyst, esophagitis     Sedation: Monitored anesthesia care (MAC)     Medication: levofloxacin 500 mg IV      Consent: We discussed the risks/benefits and alternatives to this procedure, as well as the planned sedation.  Informed consent was obtained from the patient after the risks of the procedure were discussed, including but not limited to bleeding, perforation, aspiration, infection, or possibility of a missed lesion as well as the risks of anesthesia including but not limited to cardiopulmonary complications. The patient signed informed consent and elected to proceed with EGD/EUS with intervention [i.e. Biopsy, control of bleeding, dilatation, FNA, polypectomy, endoscopic mucosal resection, etc.] as indicated.      EGD & EUS procedure: The lubricated tip of the [id number] diagnostic video upper endoscope was carefully inserted and advanced using direct visualization into the posterior pharynx and ultimately into the esophagus. After the EGD was performed, the gastroscope was removed and the echoendoscope was then carefully inserted through the oropharynx, esophagus intubated, then advanced to the stomach and descending duodenum.    Air was then withdrawn and the echoendoscope was removed. The  patient tolerated the procedure well. There were no immediate postoperative complications. The patient’s vital signs were monitored throughout the procedure and remained stable.     Estimated blood loss: insignificant     Specimens collected:  Esophageal and gastric biopsies; pancreas cyst fluid aspirate     Complications: none     EGD findings:    1. Esophagus: The squamocolumnar junction was noted at 36 cm and appeared regular. There were two large rectangular shaped erosions in the distal esophagus most suggestive of reflux esophagitis. Two cold forceps biopsies were obtained. The esophageal mucosa appeared unremarkable otherwise.  2. Stomach: The stomach distended normally. Normal rugal folds were seen. The pylorus was patent. There were a few small gastric antral erosions. The gastric mucosa appeared unremarkable otherwise. Multiple cold forceps biopsies were obtained to evaluate for h.pylori. Retroflexion revealed a normal fundus and cardia.   3. Duodenum: The duodenal mucosa appeared normal in the 1st and 2nd portion of the duodenum.      EUS findings:  A linear echoendoscope was used. The tail, body, neck, head, and uncinate process of the pancreas were fully evaluated. There was a 20.2 mm x 15.0 mm anechoic cyst with an internal septation in the tail of the pancreas. There was no mural nodule or solid component visualized. FNA was performed with a 22 gauge needle with one pass performed (using doppler to avoid intervening vessels) with aspiration of 4 mL of clear fluid into the syringe which was sent for fluid analysis and cytology. The pancreatic parenchyma was normal throughout otherwise. The pancreatic duct was normal in size measuring 1.1 mm at the body and 1.5 mm at the head of the pancreas. The common bile duct was normal in size measuring 5.4. it was traced several times from the ampulla to the hilum of the liver. There were no stones or sludge. There was not clear convergence of the pancreatic duct  and bile duct at the ampulla. Otherwise, the ampulla appeared unremarkable endosonographically.      Impression:  1. A 20.2 mm x 15.0 mm anechoic cyst with an internal septation in the tail of the pancreas s/p FNA  2. Distal esophagitis  3. A few gastric erosions  4. Otherwise, unremarkable EGD and EUS     Recommend:  1. Await final pathology, cytology and fluid study results  2. Increase pantoprazole dose to 40 mg PO daily  3. Levofloxacin 500 mg PO x 3 days  4. Follow up with Dr. Cox     >>>If biopsies were performed and you have not received your pathology results either by phone or letter within 2 weeks, please call our office at [phone].     Discussed with daughter over the phone as requested     Sp Chu MD  [facility]  3/28/2022    ======================    ERCP PROCEDURE REPORT     DATE OF PROCEDURE:  12/27/2022     PCP: Nahed Wyatt MD     PREOPERATIVE DIAGNOSIS:  Colicky RUQ abdominal pain, abnormal LFTs, nausea     POSTOPERATIVE DIAGNOSIS: Dilated biliary tree, biliary sludge     SURGEON:  Rajiv Cox M.D.     SEDATION:    General Anesthesia with endotracheal intubation provided by the Anesthesia Service     Indomethacin 100mg suppository NM at conclusion of case        ERCP PROCEDURE:    After the nature and risks of ERCP examination under General Anesthesia with possible biliary sphincterotomy, possible pancreatic or biliary stent placement, possible stone extraction were discussed with the patient and questions answered, informed consent was obtained.     Ms. Solano was interviewed and assessed by the Anesthesia service.  The patient was brought back to the fluoroscopy suite and induced, intubated lying on their back by the Anesthesia service.  We then rolled Ms. Solano over into the usual \"swimmer\"  position, padding all pressure points.     Once intubated and positioned, the Olympus adult therapeutic duodenoscope was placed in the patient's mouth  and advanced under direct visualization through the oropharynx into the esophagus, on down through the stomach suctioning out gastric contents and on through the pylorus to the duodenum.      Estimated blood loss: none/insignificant        ERCP FINDINGS:       Smooth circumferential symmetric stricture at the transition from duodenal bulb to second portion which was photographed.  Therapeutic ERCP duodenoscope navigated through this area with mild resistance.  Normal-appearing major papilla with unusually tight orifice, very difficult to engage today with the [employer] \"Dreamtome\" sphincterotomy catheter or free 0.035 inch rubber tipped guidewire; exchanging out for a 5-4-3 rigid biliary catheter and then narrow caliber 0.025 inch wire Cook sphincterotome catheter also unsuccessful.  Ultimately using the 5-4-3 rigid catheter and multiple combinations of torque, tip deflection, scope rotation we cannulated biliary tree by free cannulation using that catheter.  Arc of the catheter and guidewire on fluoroscopy imaging confirmed biliary cannulation.  Contrast injection showed short cystic stump, diffuse mild-moderate extrahepatic biliary dilation but no discrete stricture or filling defects.  After advancing the 0.018 inch guidewire through the 5-4-3 catheter, we exchanged back for the [employer] \"Dreamtome\" sphincterotomy catheter; major papilla sphincterotomy then performed over the 0.018 inch guidewire with the [employer] \"Dreamtome\" sphincterotomy catheter and the ERBE surgical generator; no bleeding.  We swapped out our guidewire for the hydrophilic 0.035 inch guidewire.  The sphincterotome catheter was exchanged out over the wire and a 9-12 mm biliary balloon was advanced up into the common hepatic duct.  Several 12mm `balloon sweeps' yielded some fine sludge, at one point a possible stone which we lost in the duodenum.  12 mm balloon pulled through the sphincterotomy with mild resistance.  I  elected to stop here.  The duodenoscope was drawn back to the stomach which was completely suctioned out and then the scope withdrawn from the patient.  Ms. Solano was extubated in the endoscopy suite and transferred to PACU in stable condition.  The pancreatic duct was not instrumented or injected during this procedure.     IMPRESSION:  Diffuse extrahepatic biliary dilation consistent with fine sludge possible stone at the major papilla, otherwise sphincter of Oddi dysfunction in this patient with recurring colicky pain and spikes in LFTs.  Successful major papilla sphincterotomy as above.  Question of pancreas divisum on imaging; pancreatic duct was not cannulated today.     RECOMMENDATIONS:     Aggressive IVF support with Lactated Ringers IVF   Clear liquid diet as tolerated; discharge home versus observation as clinical condition permits.  Prolonged cannulation attempts today as above place this patient at increased risk for post procedure pancreatitis.         Current Outpatient Medications   Medication Sig Dispense Refill    colestipol 1 g Oral Tab Take twice daily at least 2 hours  from any other medications 60 tablet 11    pantoprazole 40 MG Oral Tab EC Take 1 tablet (40 mg total) by mouth every morning before breakfast. 90 tablet 3    diphenoxylate-atropine 2.5-0.025 MG Oral Tab Take 1-2 tablets by mouth 3 (three) times daily as needed for Diarrhea. 60 tablet 3    topiramate 50 MG Oral Tab Take 1 tablet (50 mg total) by mouth nightly.      Potassium Chloride ER 10 MEQ Oral Tab CR Take 1 tablet (10 mEq total) by mouth daily.      hydroCHLOROthiazide 12.5 MG Oral Cap Take 1 capsule (12.5 mg total) by mouth daily.      DILT- MG Oral Capsule SR 24 Hr Take 1 capsule (120 mg total) by mouth daily.      ciprofloxacin 250 MG Oral Tab Take 1 tablet (250 mg total) by mouth 2 (two) times daily. (Patient not taking: Reported on 1/18/2024)      ipratropium 0.06 % Nasal Solution 2 sprays by Nasal route 3  (three) times daily. As needed for postnasal drainage. 15 mL 5    clobetasol 0.05 % External Cream For use with lichen planus orally twice daily for 7 days 30 g 2    rifAXIMin 550 MG Oral Tab Take 1 tablet (550 mg total) by mouth 3 (three) times daily. 100 tablet 2    traMADol 50 MG Oral Tab Take 1 tablet (50 mg total) by mouth every 8 (eight) hours as needed for Pain. (Patient not taking: Reported on 1/18/2024) 30 tablet 1    clobetasol 0.05 % External Cream Apply 1 Application topically 2 (two) times daily. 30 g 3    metFORMIN 500 MG Oral Tab Take 2 tablets (1,000 mg total) by mouth 2 (two) times daily with meals. 360 tablet 3    traMADol 50 MG Oral Tab Take 1 tablet (50 mg total) by mouth every 8 (eight) hours as needed for Pain. 30 tablet 0    telmisartan 80 MG Oral Tab Take 1 tablet (80 mg total) by mouth daily. (Patient taking differently: Take 1 tablet (80 mg total) by mouth nightly.) 90 tablet 3    estradiol (ESTRACE) 0.5 MG Oral Tab Take 1 tablet (0.5 mg total) by mouth daily. 90 tablet 1    Glucose Blood (FREESTYLE LITE TEST) In Vitro Strip Test Blood Sugar Once Daily. Non-Insulin Dependent Diabetes Type II. E11.9. 100 strip 3    Blood Glucose Monitoring Suppl (ACCU-CHEK TYRONE) Does not apply Device Test Blood Sugar Once Daily. Non-Insulin Dependent Diabetes Type II. E11.9. 1 Device 0    ACCU-CHEK SOFTCLIX LANCETS Does not apply Misc Test Blood Sugar Once Daily. Non-Insulin Dependent Diabetes Type II. E11.9. 100 each 3    Probiotic Product (VSL#3) Oral Cap Take by mouth daily.        Multiple Vitamins-Minerals (MULTI VITAMIN/MINERALS) Oral Tab Take 1 tablet by mouth daily.             Allergies:  Allergies   Allergen Reactions    Prednisone HIVES    Sulfa Antibiotics HIVES    Bactrim [Sulfamethoxazole W/Trimethoprim] MYALGIA    Ditropan [Oxybutynin] HIVES and RASH    Statins OTHER (SEE COMMENTS)     transaminitis    Lisinopril Coughing     Imaging: No results found.       PHYSICAL EXAM:   Physical  Exam               Meds This Visit:  Requested Prescriptions      No prescriptions requested or ordered in this encounter       Imaging & Referrals:  None       ID#[id number]

## 2024-08-26 NOTE — TELEPHONE ENCOUNTER
From: Paul Villanueva  To: Shelbie Raya MD  Sent: 3/27/2023 7:30 PM CDT  Subject: MRI      Hi Dr,  Just wanted you to know I have scheduled my MRI with [facility] on April 10th. They have the Open MRI.     Thanks   [employer] Statement Selected

## 2024-08-28 NOTE — TELEPHONE ENCOUNTER
Reza Diego, not sure if there is already a recall in the system for next MRI scan.    Could you update otherwise place a new recall to call Yari on/after 3/17/2025 to schedule her next MRI abdomen/pancreas for mid April 2025?  She has a pancreatic cyst that we are watching.  She needs open MRI with IV contrast which apparently only can be done at the [facility] imaging facility.    Open MRI with IV contrast at [facility], follow-up of cystic pancreatic lesion    - cb  ====================================    Message sent to pt outreach for pt to complete next MRI on 03/17/2025

## 2024-09-03 ENCOUNTER — PATIENT MESSAGE (OUTPATIENT)
Dept: OTOLARYNGOLOGY | Facility: CLINIC | Age: 74
End: 2024-09-03

## 2024-09-04 RX ORDER — IPRATROPIUM BROMIDE 42 UG/1
2 SPRAY, METERED NASAL 3 TIMES DAILY
Qty: 15 ML | Refills: 5 | Status: SHIPPED | OUTPATIENT
Start: 2024-09-04

## 2024-09-04 NOTE — TELEPHONE ENCOUNTER
From: Yari Solano  To: Peace Chamorro  Sent: 9/3/2024 2:21 PM CDT  Subject: Medication     Dr. Chamorro  Hi, Dr. Chamorro,  It’s Yari Solano, I was wondering if I could get a refill of my Ipratropium Bromide Nasal Solution 0.06%.  I am all out of it! My pharmacy is [facility] in [place]. Ph# is .  Thank you,  Yari Solano

## 2025-01-27 ENCOUNTER — OFFICE VISIT (OUTPATIENT)
Dept: OTOLARYNGOLOGY | Facility: CLINIC | Age: 75
End: 2025-01-27
Payer: MEDICARE

## 2025-01-27 DIAGNOSIS — H61.23 CERUMEN DEBRIS ON TYMPANIC MEMBRANE OF BOTH EARS: ICD-10-CM

## 2025-01-27 DIAGNOSIS — R09.89 CHRONIC THROAT CLEARING: Primary | ICD-10-CM

## 2025-01-27 PROCEDURE — 99213 OFFICE O/P EST LOW 20 MIN: CPT | Performed by: SPECIALIST

## 2025-01-27 RX ORDER — FAMOTIDINE 40 MG/1
40 TABLET, FILM COATED ORAL DAILY
Qty: 30 TABLET | Refills: 3 | Status: SHIPPED | OUTPATIENT
Start: 2025-01-27

## 2025-01-27 NOTE — PATIENT INSTRUCTIONS
Your ears were fully cleaned of cerumen.  I suspect you have laryngal pharyngeal reflux disease.  You were given a handout for this.  You were also placed on a trial of famotidine at bedtime to add to the pantoprazole in the morning.  Follow-up in 6 weeks time, sooner if problems.  If your throat clearing is not resolved, a fiberoptic scope will be recommended.

## 2025-01-27 NOTE — PROGRESS NOTES
Yari Solano is a 74 year old female.   Chief Complaint   Patient presents with    Follow - Up     Ear cleaning and throat clearing      HPI:   Patient here to get her ears cleaned in for chronic throat clearing.  He has been on pantoprazole in the morning.    Current Outpatient Medications   Medication Sig Dispense Refill    famotidine 40 MG Oral Tab Take 1 tablet (40 mg total) by mouth daily. 30 tablet 3    ipratropium 0.06 % Nasal Solution 2 sprays by Nasal route 3 (three) times daily. As needed for postnasal drainage. 15 mL 5    ASCOMP-CODEINE -70-30 MG Oral Cap Take 1 capsule by mouth every 4 to 6 hours as needed.      pantoprazole 40 MG Oral Tab EC Take 1 tablet (40 mg total) by mouth every morning before breakfast. 90 tablet 3    diphenoxylate-atropine 2.5-0.025 MG Oral Tab Take 1-2 tablets by mouth 3 (three) times daily as needed for Diarrhea. 60 tablet 3    topiramate 50 MG Oral Tab Take 1 tablet (50 mg total) by mouth nightly.      Potassium Chloride ER 10 MEQ Oral Tab CR Take 1 tablet (10 mEq total) by mouth daily.      hydroCHLOROthiazide 12.5 MG Oral Cap Take 1 capsule (12.5 mg total) by mouth daily.      DILT- MG Oral Capsule SR 24 Hr Take 1 capsule (120 mg total) by mouth daily.      rifAXIMin 550 MG Oral Tab Take 1 tablet (550 mg total) by mouth 3 (three) times daily. (Patient taking differently: Take 1 tablet (550 mg total) by mouth As Directed. Only once a year) 100 tablet 2    metFORMIN 500 MG Oral Tab Take 2 tablets (1,000 mg total) by mouth 2 (two) times daily with meals. 360 tablet 3    traMADol 50 MG Oral Tab Take 1 tablet (50 mg total) by mouth every 8 (eight) hours as needed for Pain. 30 tablet 0    telmisartan 80 MG Oral Tab Take 1 tablet (80 mg total) by mouth daily. (Patient taking differently: Take 1 tablet (80 mg total) by mouth nightly.) 90 tablet 3    Glucose Blood (FREESTYLE LITE TEST) In Vitro Strip Test Blood Sugar Once Daily. Non-Insulin Dependent Diabetes Type  II. E11.9. 100 strip 3    Blood Glucose Monitoring Suppl (ACCU-CHEK TYRONE) Does not apply Device Test Blood Sugar Once Daily. Non-Insulin Dependent Diabetes Type II. E11.9. 1 Device 0    ACCU-CHEK SOFTCLIX LANCETS Does not apply Misc Test Blood Sugar Once Daily. Non-Insulin Dependent Diabetes Type II. E11.9. 100 each 3    Multiple Vitamins-Minerals (MULTI VITAMIN/MINERALS) Oral Tab Take 1 tablet by mouth daily.        colestipol 1 g Oral Tab Take twice daily at least 2 hours  from any other medications (Patient not taking: Reported on 1/27/2025) 60 tablet 11    ciprofloxacin 250 MG Oral Tab Take 1 tablet (250 mg total) by mouth 2 (two) times daily. (Patient not taking: Reported on 1/27/2025)      clobetasol 0.05 % External Cream For use with lichen planus orally twice daily for 7 days (Patient not taking: Reported on 1/27/2025) 30 g 2    traMADol 50 MG Oral Tab Take 1 tablet (50 mg total) by mouth every 8 (eight) hours as needed for Pain. (Patient not taking: Reported on 1/27/2025) 30 tablet 1    clobetasol 0.05 % External Cream Apply 1 Application topically 2 (two) times daily. (Patient not taking: Reported on 1/27/2025) 30 g 3    estradiol (ESTRACE) 0.5 MG Oral Tab Take 1 tablet (0.5 mg total) by mouth daily. (Patient not taking: Reported on 1/27/2025) 90 tablet 1    Probiotic Product (VSL#3) Oral Cap Take by mouth daily.          Past Medical History:    Agatston coronary artery calcium score between 200 and 399    score 356    Cataract    Chronic diarrhea    Diabetic retinopathy of left eye (HCC)    High blood pressure    IBS (irritable bowel syndrome)    Incontinence    urine    Internal hemorrhoids    Migraines    Osteoarthritis    Other and unspecified hyperlipidemia    Pancreas cyst (HCC)    Type II or unspecified type diabetes mellitus without mention of complication, not stated as uncontrolled    Unspecified essential hypertension      Social History:  Social History     Socioeconomic History     Marital status: Single   Occupational History    Occupation: reitred    Tobacco Use    Smoking status: Former     Current packs/day: 0.00     Average packs/day: 0.3 packs/day for 30.0 years (9.9 ttl pk-yrs)     Types: Cigarettes     Start date: 1960     Quit date: 1990     Years since quittin.0     Passive exposure: Past    Smokeless tobacco: Never   Vaping Use    Vaping status: Never Used   Substance and Sexual Activity    Alcohol use: No     Alcohol/week: 0.0 standard drinks of alcohol    Drug use: No   Other Topics Concern    Caffeine Concern Yes   Social History Narrative    ** Merged History Encounter **          Social Drivers of Health     Financial Resource Strain: Low Risk  (2024)    Received from [facility]    Overall Financial Resource Strain (CARDIA)     Difficulty of Paying Living Expenses: Not hard at all   Food Insecurity: No Food Insecurity (2024)    [facility]     Worried About Running Out of Food in the Last Year: No     Ran Out of Food in the Last Year: No   Transportation Needs: No Transportation Needs (2024)    [facility]     Lack of Transportation: No   Physical Activity: Sufficiently Active (2024)    Received from [facility]    Exercise Vital Sign     Days of Exercise per Week: 7 days     Minutes of Exercise per Session: 40 min   Stress: No Stress Concern Present (2024)    Received from [facility]    [employer] - Occupational Stress Questionnaire     Feeling of Stress : Not at all   Social Connections: Moderately Integrated (2024)    Received from [facility]    Social Connection and Isolation Panel [NHANES]     Frequency of Communication with Friends and Family: More than three times a week     Frequency of Social Gatherings with Friends and Family: More than three times a week      Attends Muslim Services: More than 4 times per year     Active Member of Clubs or Organizations: Yes     Attends Club or Organization Meetings: More than 4 times per year     Marital Status:    Housing Stability: At Risk (9/24/2024)    [facility]     Has Housing: Yes     Worried About Losing Housing: No     Unable to Get Utilities: Yes        REVIEW OF SYSTEMS:   GENERAL HEALTH: feels well otherwise  GENERAL : denies fever, chills, sweats, weight loss, weight gain  SKIN: denies any unusual skin lesions or rashes  RESPIRATORY: denies shortness of breath with exertion  NEURO: denies headaches    EXAM:   There were no vitals taken for this visit.  System Details   Skin Inspection - Normal.   Constitutional Overall appearance - Normal.   Head/Face Facial features - Normal. Eyebrows - Normal. Skull - Normal.   Eyes Conjunctiva - Right: Normal, Left: Normal. Pupil - Right: Normal, Left: Normal.    Ears Inspection - Right: Normal, Left: Normal.   Canal -nonocclusive cerumen cleaned bilaterally  TM - Right: Normal, Left: Normal.   Nasal External nose - Normal.   Nasal septum - Normal.  Turbinates - Normal   Oral/Oropharynx Lips - Normal, Tonsils - Normal, Tongue - Normal    Neck Exam Inspection - Normal. Palpation - Normal. Parotid gland - Normal. Thyroid gland - Normal.   Lymph Detail Submental. Submandibular. Anterior cervical. Posterior cervical. Supraclavicular.  All without enlargement   Larynx Mirror examination with limited view.  Epiglottis is retroflexed.  Some posterior laryngeal erythema and no retained secretions however view is very limited.   Psychiatric Orientation - Oriented to time, place, person & situation. Appropriate mood and affect.   Neurological Memory - Normal. Cranial nerves - Cranial nerves II through XII grossly intact.   Nasopharynx    Larynx      ASSESSMENT AND PLAN:   1. Chronic throat clearing  Patient on pantoprazole in the  morning.  Given a handout for both reflux disease and LPR.  I also added famotidine at bedtime.  I asked her to follow-up in 6 weeks time, sooner if problems.  If symptoms do not resolve a fiberoptic scope will be recommended.    2. Cerumen debris on tympanic membrane of both ears  Fully cleaned.      The patient indicates understanding of these issues and agrees to the plan.      Peace Chamorro MD  1/27/2025  1:41 PM

## 2025-02-24 ENCOUNTER — LAB ENCOUNTER (OUTPATIENT)
Dept: LAB | Facility: HOSPITAL | Age: 75
End: 2025-02-24
Attending: STUDENT IN AN ORGANIZED HEALTH CARE EDUCATION/TRAINING PROGRAM
Payer: MEDICARE

## 2025-02-24 DIAGNOSIS — E78.2 MIXED HYPERLIPIDEMIA: Primary | ICD-10-CM

## 2025-02-24 LAB
CHOLEST SERPL-MCNC: 187 MG/DL (ref ?–200)
FASTING PATIENT LIPID ANSWER: YES
HDLC SERPL-MCNC: 48 MG/DL (ref 40–59)
LDLC SERPL CALC-MCNC: 105 MG/DL (ref ?–100)
NONHDLC SERPL-MCNC: 139 MG/DL (ref ?–130)
TRIGL SERPL-MCNC: 195 MG/DL (ref 30–149)
VLDLC SERPL CALC-MCNC: 33 MG/DL (ref 0–30)

## 2025-02-24 PROCEDURE — 36415 COLL VENOUS BLD VENIPUNCTURE: CPT

## 2025-02-24 PROCEDURE — 83695 ASSAY OF LIPOPROTEIN(A): CPT

## 2025-02-24 PROCEDURE — 80061 LIPID PANEL: CPT

## 2025-02-25 LAB — LIPOPROTEIN (A): 12.2 NMOL/L

## 2025-03-11 ENCOUNTER — TELEPHONE (OUTPATIENT)
Dept: GASTROENTEROLOGY | Facility: CLINIC | Age: 75
End: 2025-03-11

## 2025-03-11 DIAGNOSIS — K86.9 LESION OF PANCREAS (HCC): Primary | ICD-10-CM

## 2025-03-11 DIAGNOSIS — K83.8 DILATED BILE DUCT: ICD-10-CM

## 2025-03-11 DIAGNOSIS — K86.2 PANCREAS CYST (HCC): ICD-10-CM

## 2025-03-11 NOTE — TELEPHONE ENCOUNTER
Patient outreach message received:    Message sent to pt outreach for pt to complete next MRI on 03/17/2025

## 2025-03-14 NOTE — TELEPHONE ENCOUNTER
Thanks Armando and Johnathon.  Ms Solano is pretty awesome and we should keep up with this little pancreatic cyst.    MRI ordered.    Her last exam was at [facility] (see 2023 scanned report) - may ask to return there....    - cb

## 2025-03-19 NOTE — TELEPHONE ENCOUNTER
I spoke to the pt    She asked if we could fax her MRI order over to [facility]    I told her this would be fine    I faxed the order and received fax confirmation that it was successfully transmitted    [facility] - [place] [id number], [facility]  Fax: [phone]

## 2025-04-15 ENCOUNTER — TELEPHONE (OUTPATIENT)
Facility: CLINIC | Age: 75
End: 2025-04-15

## 2025-04-15 NOTE — TELEPHONE ENCOUNTER
The patient called to speak with a nurse in regards to the MRI that Dr. Cox ordered. Please call.

## 2025-04-16 NOTE — TELEPHONE ENCOUNTER
I spoke to the pt    She wanted us to know she had the MRI yesterday    [facility] will be faxing the results over in 1-3 days    Pt asking for appt to talk to Dr Cox about swallowing issues, similar to what she has had in the past    We scheduled an appointment    Date time and location confirmed    Your Appointments      Wednesday May 07, 2025 11:00 AM  Follow Up Visit with Rajiv Cox MD  [facility] ([address] [address]  [phone]

## 2025-05-07 ENCOUNTER — OFFICE VISIT (OUTPATIENT)
Facility: CLINIC | Age: 75
End: 2025-05-07

## 2025-05-07 ENCOUNTER — TELEPHONE (OUTPATIENT)
Facility: CLINIC | Age: 75
End: 2025-05-07

## 2025-05-07 VITALS
HEIGHT: 60.25 IN | HEART RATE: 75 BPM | DIASTOLIC BLOOD PRESSURE: 71 MMHG | BODY MASS INDEX: 26.55 KG/M2 | WEIGHT: 137 LBS | SYSTOLIC BLOOD PRESSURE: 109 MMHG

## 2025-05-07 DIAGNOSIS — R10.13 DYSPEPSIA: ICD-10-CM

## 2025-05-07 DIAGNOSIS — K86.2 PANCREAS CYST (HCC): ICD-10-CM

## 2025-05-07 DIAGNOSIS — K21.9 GASTROESOPHAGEAL REFLUX DISEASE, UNSPECIFIED WHETHER ESOPHAGITIS PRESENT: Primary | ICD-10-CM

## 2025-05-07 DIAGNOSIS — R10.13 EPIGASTRIC ABDOMINAL PAIN: Primary | ICD-10-CM

## 2025-05-07 DIAGNOSIS — R74.01 TRANSAMINITIS: ICD-10-CM

## 2025-05-07 DIAGNOSIS — R13.19 ESOPHAGEAL DYSPHAGIA: ICD-10-CM

## 2025-05-07 PROCEDURE — 99214 OFFICE O/P EST MOD 30 MIN: CPT | Performed by: INTERNAL MEDICINE

## 2025-05-07 NOTE — PATIENT INSTRUCTIONS
Schedule EGD with possible esophageal dilation (stomach endoscopy) exam at [facility] ([facility])    BMI Readings from Last 1 Encounters:   05/07/25 26.53 kg/m²       MAC anesthesia    Diagnosis = Dyspepsia, GERD with dysphagia    Medication instructions:    Hold metformin and telmisartan day of procedure

## 2025-05-07 NOTE — PROGRESS NOTES
**  SCROLL DOWN FOR HPI **      ASSESSMENT/PLAN:       74 year old woman with history of Type 2 diabetes, hypertension, dyslipidemia who presents today to transition care to our GI practice.    Ms. Solano describes a 50-year history of chronic diarrhea.  This has been severe since age 20.  On bad days, she can have over 5 episodes of diarrhea per day including awakening overnight for nocturnal diarrhea episodes.    There has been and can be full fecal incontinence with these episodes.  Diarrhea can be uncontrollable.    Over the past 50 years, Ms. Solano describes evaluation and hospitalizations for `colitis,' diverticulitis, irritable bowel syndrome, `ulcerative colitis.'  She was hospitalized for the colitis for 14 days apparently when she was very young, diagnosed and hospitalized with diverticulitis somewhere in her 50s.    Also has been following an incidental 1.7 cm pancreatic cystic lesion with serial imaging.    It does not sound like there has been a consistent diagnosis over the years.  No recent steroid use or recalled concern for inflammatory process such as inflammatory bowel disease.    Previously and consistently relieved by rifaximin antibiotic as below.    Of note, Ms. Solano underwent cholecystectomy surgery in 1994.  She does not believe that the diarrhea worsened significantly after the surgery.    Recent measures that have helped:  Gluten-free diet as directed by an alternative medicine practitioner has given her great improvement past 2 years.  Still occasional flareups but much better than before.  Avoids dairy  Takes Lomotil as an abortive therapy when these episodes occur.  This gives relief but causes constipation which can last for several days.  No such effect from Imodium.  Imodium does not work.  Repeated courses of rifaximin antibiotic have been very helpful since 2015.  Previous doctors have given her free samples.  She took several courses from 2015 onwards, including 2 courses in  February 2021 for what sounds like a fairly severe flare of the diarrhea.  Took a full of 28 days of the rifaximin in February 2021 and the diarrhea persisted through March.    Diarrhea can be uncontrollable.  However, no associated abdominal cramping.    Repeated evaluations including celiac testing, colonic biopsies, stool tests including as below have been reassuring.  Recent fecal calprotectin of 2/23/2021 was elevated at 155.    Multiple colonoscopy examinations have been performed, most recently February 2020, February 2019, 2014 as copied above.    Again reports subjective improvement on a course of rifaximin antibiotic taken approximately September 2021.    Returns for follow-up 2/20/2022 regarding hospitalization 2/4/2022 above regarding right upper quadrant pain suspicious for biliary colic with very impressive spike in LFTs on labs of 2/3/2022 above.  LFTs immediately improved, faster than one would expect with a drug toxicity.  Question of biliary event versus pravastatin statin toxicity.    Returns for follow-up 5/27/2022 overall doing very well after MRI MRCP and then EUS of pancreatic lesion February and March 2022.  Incidental finding is significant reflux esophagitis on the endoscopy exams led to a prescription of pantoprazole which has helped dramatically with her previous dysphagia symptoms.    ERCP and sphincterotomy performed 12/27/2022.    Returns for follow-up 8/23/2024 much better with regards to the previous colicky upper abdominal pains, but frustrated with the chronic diarrhea and recent severe constipation/ileus on a trial of colestipol medication.    Returns for follow-up 5/7/2025 overall doing well, with recent dysphagia concern.  Follow-up MRI shows stable pancreatic cystic lesions.    Suggest:    Irritable bowel syndrome with diarrhea, rare episodes full fecal incontinence, ?mild elevation fecal calprotectin?    Lifelong history of nonbloody diarrhea, lack of alarm signs highly  suggestive of dietary intolerance, functional process.  History of cholecystectomy surgery 1994 with no recalled change in bowel patterns.  May have been trialed on cholestyramine resin which tasted awful and could not tolerate  Not a candidate for Viberzi medication due to cholecystectomy  See summary recent labs above  Elevated fecal calprotectin level stool assay of 2/23/2021 a bit atypical; 2014 random colonic biopsies were entirely normal  Multiple colonoscopy examinations have been performed as above including 2014, February 2019, February 2020  Significant improvement on gluten-free diet  Some relief from Lomotil but not Imodium.  Lomotil gives relief but then causes significant constipation.  No abdominal cramping with this diarrhea.  Therefore does not recall trial of dicyclomine/hyoscyamine antispasmodics.  Could consider in the future.    Rifaximin has worked for her each time she has turned to it, previously September 2021.  Ultimately filling this medication from [facility] with acceptable cost.  Diarrhea recently acting up previous visit 2/28/2022 and I advised Ms. Solano to proceed with another 5 - 14 day course.  New trial of Colestid tablets 1 g twice daily prescribed 7/27/2024.  When taken once daily for just 1 week this caused constipation and complete ileus/shut down.  3 weeks later, bowel movements are just resuming on follow-up visit 8/23/2024.  Previously 8/23/2024 we discussed cautious titration of the Colestid, taking it just once weekly increasing it to 2 or 3 doses per week and seeing if that can moderate the chronic diarrhea.  Otherwise gets good relief from Lomotil as above.  Diarrhea seems to be better on follow-up visit 5/7/2025.  Not volunteered as a concern.      2.  Incidental cystic pancreatic lesions ?2017  Initial documented ocurrence in the Epic record was MRI scan June 2017 (21 mm).  Subsequent CT scan 3/20/2021 describes stable 1.7 cm pancreatic cystic lesion when  compared to November 2019 exam.  Ms. Solano has undergone EUS exam with cyst aspiration July 2017 which was reassuring:  Previous EUS and FNA performed Basil Erickson MD 7/27/2017: \"This cyst contained a single septum. The cyst measured 1.6 cm x 1.4 cm. \"  Incidentally re-evaluated with CT scan and MRI scan as per above hospitalization of 2/4/2022.  Question of slight interval enlargement of the pancreatic cyst and internal septation.  With question of changes, 5 years since previous EUS evaluation I recommended further evaluation with Dr. Sp Chu of our group for EUS procedure.    Reassuring EUS and FNA sampling of pancreatic lesion as below Dr Chu 3/28/2022.  At least one of the fluid studies was indeterminant.  1 year follow-up MRI scan reassuring 4/10/2023  Stable/unchanged lesions on most recent follow-up MRI abdomen 4/15/2025.  This was a new imaging center (\"[facility]\") but she was able to bring the DVD of her previous scan images and radiologist was able to open images and compare for; very very helpful.  Would repeat MRI scan in 2 years.    3.  Incidental finding pancreatic divisum on imaging     Uncertain significance.  No history of abdominal pain with this chronic watery diarrhea.  I explained this redemonstrated incidental finding during office visit of 2/28/2022.  Ms. Solano denies previous history of acute pancreatitis or recurrent pancreatitis.  I advised that this is likely an incidental finding of no clinical significance.  Repeatedly normal fecal pancreatic elastase levels as above  Possible pancreas divisum by the EUS of pancreatic and common bile duct 3/28/2022 above.  Resolution of previous intense colicky epigastric and RUQ pain after ERCP/spincterotomy procedure below 12/27/2022.    4.  Recurring colicky RUQ pain, impressive spike in LFTs 2/3/2022 as above  Very distant history of emergency cholecystectomy surgery approximately 30 years ago.  No known issues since then with  choledocholithiasis or abnormal biliary tree on imaging  See recent MRI MRCP describing 7 mm CBD, normal pancreatic duct above  CBD was evaluated, found to be unremarkable during recent EUS exam 3/28/2022 above.  Likely pancreas divisum as above.  Resolution of previous intense colicky epigastric and RUQ pain after ERCP/spincterotomy procedure below 12/27/2022.  Possible component of small stone, biliary sludge.  Some question of recent symptoms discussed 5/7/2025 visit; could revisit consider sphincteroplasty in the future  CMP ordered today 5/7/2025    5.  Reflux esophagitis with mucosal erosions on EGD examination 3/28/2022; long history of dysphagia and food impaction symptoms  On previous follow-up Ms. Solano describes dramatic improvement on once daily pantoprazole prescription prescribed 3/28/2022.  No further dysphagia or food impaction events.  Significant dysphagia and food impaction events described on follow-up visit 5/7/2025; we will schedule another EGD examination possible dilation  Continue indefinite PPI therapy at this point.    6.  Colon cancer screening, average risk  Repeatedly reassuring colonoscopy examinations including February 2019 and February 2020  Not likely due for screening colonoscopy examination until February 2027-February 2030          Prior to this encounter, I spent over 10 minutes preparing for the visit, including reviewing documents from other specialties as well as from PCP and going over test results. During and after the face to face encounter, I spent an additional 40 minutes discussing the above and counseling this patient, reviewing chart notes and data with patient and composing this note.      Digital transcription software was utilized to produce this note. The note was proofread for content only. Typographical errors may remain.       Office visit 5/7/2025:   HPI:    Patient ID: Yari Solano is a ela 74 year old woman with history of Type 2 diabetes,  hypertension, dyslipidemia who returns today for follow-up and she is looking just ela.    Son Arnel has been struggling with some significant medical events.  After recovering from total knee replacement surgery, he had what turned out to be a benign breast mass resected; now concern for a pinched nerve in the neck, undergoing physical therapy and injections.    Yari comes in today to follow-up on the following concerns:    1.  Cystic pancreatic lesions    Scanned outside MRI scan 4/15/2025 is reassuring, stable/unchanged lesions.  These appear to be stable going back to at least the 2017 evaluation copied below.  As per notes below, this concern has been evaluated with at least 2 endoscopic ultrasound exams.  Occasional mild epigastric pains which Yari attributes to the more recent biliary concern.    2.  Yari describes dyspepsia but not typical GERD symptoms.  There has been recent dysphagia.  She describes difficulty swallowing and larger bites repeatedly stopping on the way down.    She is aware of cutting her food into small pieces, washing down with multiple sips of water.    Yari is now taking the pantoprazole, states that she does not need refills.    3.  Previous biliary concern has been quiescent until some recent milder epigastric pains.  Yari asks if we could repeat her LFTs.      ======================  Previous visit 8/23/2024:     Yari Solano is a ela woman with history of Type 2 diabetes, hypertension, dyslipidemia who returns today for follow-up regarding recent symptoms, impressive spike in LFTs February 2022, and recent US and sampling of pancreatic cystic lesion Dr. Chu 3/28/2022.    After last time, I performed ERCP and sphincterotomy December 2022 for these worsening colicky pain episodes, so far with resolution of the pain ever since.  Great news.    Family is doing well.  Alcides and Marianne are in [place] at an [email or web address].  Marianne working as  a , Alcides took courses at [employer], now working at [employer] in [place].    Ms Solano had called us regarding chronic diarrhea.  She does take Lomotil as needed with relief.  As per previous plan, I recommended and prescribed colestipol 7/27/2024.  Took colestipol for one week (once daily).  The colestipol caused severe constipation.  No pain; just complete ileus.  She stopped after about 1 week.  Miralax and stool softeners didn't relieve.    Last dose colestipol approx 3 weeks ago.  Seems to be just now wearing off.  Previous bowel patterns and previous diarrhea seem to be resuming.    Overall, Yari looks great today.  We reviewed all of the above and talked about considering careful titration of the colestipol.  ======================  Previous visit 5/27/2022:     Yari Solano is a ela woman with history of Type 2 diabetes, hypertension, dyslipidemia who returns today for follow-up regarding recent symptoms, impressive spike in LFTs February 2022, and recent US and sampling of pancreatic cystic lesion Dr. Chu 3/28/2022.    Fluid was sent for testing including CEA, glucose level, amylase.  Results including CEA level of 12.4, low glucose level of < 1 mg/dL were inconclusive.    Dr. Chu recommended repeat MRI MRCP in 1 year.    Dr. Chu discovered significant reflux esophagitis distal esophagus during the exam.  He prescribed pantoprazole 3/28/2022.    Ms. Solano denies any recent history of GERD symptoms.  She does however describe a very long history of dysphagia and food impaction events.  She describes history of food getting stuck in the substernal region, needing to get up and go and retch, vomit food up.    Dysphagia has entirely resolved on the new pantoprazole medication.  Certainly no further food impaction events.    Off previous statin therapy as per previous notes.  Now on Zetia cholesterol therapy.  Also getting that from [facility].    Very very happy about  improvement on the pantoprazole.    Previous rifaximin prescription taken on a PRN basis continues to give good relief when the IBS-diarrhea acts up.  125 tablets of rifaximin from [facility] currently cost her $300 which she states is tolerable.    Nothing new with the chronic diarrhea.  This is tolerable at current baseline.  Takes Imodium regularly, several days per week as well as Lomotil when the diarrhea acts up.  She estimates 15 pills/month of the Lomotil.  Also takes occasional dose of tramadol for the intermittent upper abdominal pain.  Needs new prescriptions.    ====================  Previous visit 2/28/2022:     Yari Solano is a ela 71 year old woman with history of Type 2 diabetes, hypertension, dyslipidemia who returns today with her very supportive daughter for follow-up.    Ms. Solano presented to the ED and hospitalized 2/4/2022 - 2/6/2022 for approximately 2-month history of right upper quadrant abdominal pain which abruptly became severe.  She was found to have abnormal elevated hepatic transaminases.  She improved with hospitalization, holding outpatient medications including topiramate, statin medication herbal supplements.  LFTs rapidly trended down by the following day, somewhat inconsistent with medication toxicity.      Labs 2/3/2021 showed: AST 1630 ALT 1162 alk phosphatase 196 total bilirubin 0.89  Labs 2/4/2022 showed:   alk phosphatase 173  Labs 2/6/2022 showed:  AST 41  alk phosphatase 129 total bilirubin 0.4  Labs 2/26/2022 showed:  AST 10 ALT 38 alk phosphatase 91 total bilirubin 0.5    Previous LFTs here 7/1/2021 showed AST 12 ALT 12 alk phosphatase 66.    Ms. Solano suspects statin toxicity.  All other medications have been consistent for at least 10 years maybe more.  She was prescribed pravastatin medication in May and June 2021, but did not start it until approximately September 2021.  As she had observed, her previous CMP 7/1/2021 was  normal, then nothing in the months immediately after starting the pravastatin in 2021.  Next LFTs 2/3/2021 were profoundly elevated as above.    Pravastatin was held on admission and she stayed off it.  She has resumed her previous topiramate migraine prophylaxis medication baseline antihypertensive medications.    Starting Zetia lipid-lowering therapy as soon as shipment arrives from [facility].    Today, Ms. Solano and her daughter accompanying give a history of recurring colicky pain.    They describe an intense recurring right upper quadrant pain at least the past 2-3 months which seems to come at random, unpredictable.  It has happened at home, after eating, recently during her friend's  service.  Pain can be severe, disabling.  It resolves almost immediately with a single dose of tramadol.    The pain leading to ED presentation of 2022 was one of the worst yet.  Initially, it did not respond to the tramadol as per usual.  By the time she got into the ER however, the pain was resolving.  She believes that she had no pain after admission.  LFTs rapidly improved as above.    Regarding the previous diarrhea, a course of rifaximin antibiotic 2021 apparently purchased through [facility] did give significant relief.  She was very happy with that.  Diarrhea has been more active past 3 weeks and today Ms. Solano asks if safe to take another course of the rifaximin.  She got a fairly large volume of the medication from [facility].    ====================  Previous visit 2021:     Yari Solano is a ela 71 year old woman with history of Type 2 diabetes, hypertension, dyslipidemia who presents today to transition care to our GI practice.    Ms. Solano describes a 50-year history of chronic diarrhea.  This has been severe since age 20.  On bad days, she can have over 5 episodes of diarrhea per day including awakening overnight for nocturnal diarrhea episodes.    There has been and can be  full fecal incontinence with these episodes.  Diarrhea can be uncontrollable.    Over the past 50 years, Ms. Solano describes evaluation and hospitalizations for `colitis,' diverticulitis, irritable bowel syndrome, `ulcerative colitis.'  She was hospitalized for the colitis for 14 days apparently when she was very young, diagnosed and hospitalized with diverticulitis somewhere in her 50s.    Also has been following an incidental 1.7 cm pancreatic cystic lesion with serial imaging.    It does not sound like there has been a consistent diagnosis over the years.  No recent steroid use or recalled concern for inflammatory process such as inflammatory bowel disease.    Of note, Ms. Solano underwent cholecystectomy surgery in 1994.  She does not believe that the diarrhea worsened significantly after the surgery.    Recent measures that have helped:  Gluten-free diet as directed by an alternative medicine practitioner has given her great improvement past 2 years.  Still occasional flareups but much better than before.  Avoids dairy  Takes Lomotil as an abortive therapy when these episodes occur.  This gives relief but causes constipation which can last for several days.  No such effect from Imodium.  Imodium does not work.  Repeated courses of rifaximin antibiotic have been very helpful since 2015.  Previous doctors have given her free samples.  She took several courses from 2015 onwards, including 2 courses in February 2021 for what sounds like a fairly severe flare of the diarrhea.  Took a full of 28 days of the rifaximin in February 2021 and the diarrhea persisted through March.    Diarrhea can be controllable.  However, no associated abdominal cramping.    Repeated evaluations including celiac testing, colonic biopsies, stool tests including as below have been reassuring.  Recent fecal calprotectin of 2/23/2021 was elevated at 155.    Multiple colonoscopy examinations have been performed, most recently February 2020,   as copied below.    No alcohol consumption.  Quit previous smoking habit 35 years ago    Records provided and reviewed today:    Stool studies 2021:  Elevated fecal calprotectin 155.1 mg/kg  Pancreatic fecal elastase 453 normal  C. difficile negative  Stool PCR panel for infectious pathogens negative    Pancreatic fecal elastase sample of 3/13/2021 was 612 normal    Operative report of colonoscopy examination 2020 Dr. Saleem Campos:  Conscious sedation  Colonoscopy examination to the cecum  Adequate prep quality  Hemorrhoids only    Wt Readings from Last 20 Encounters:   25 137 lb (62.1 kg)   24 136 lb 6.4 oz (61.9 kg)   22 143 lb (64.9 kg)   22 143 lb (64.9 kg)   22 143 lb 12.8 oz (65.2 kg)   22 144 lb (65.3 kg)   22 145 lb (65.8 kg)   22 145 lb (65.8 kg)   22 147 lb 11.2 oz (67 kg)   22 144 lb 3.2 oz (65.4 kg)   21 148 lb (67.1 kg)   21 147 lb 6.4 oz (66.9 kg)   21 139 lb (63 kg)   21 139 lb (63 kg)   21 144 lb (65.3 kg)   21 139 lb (63 kg)   03/15/21 138 lb (62.6 kg)   21 142 lb (64.4 kg)   20 147 lb (66.7 kg)   20 147 lb (66.7 kg)         Previous EGD examination: ?  Previous colonoscopy(ies):     [facility]    Review of Systems    ====================    KELLIE MUSE                    :  48                                           MICROBIOLOGY                          SOURCE: BLOOD                    ACCESSION:                                                             Verified:2014 12:30        Test Name: [facility] CELIAC SEROLOGY  1155    Location History: [address], [address]                                                            Verified:2014 12:30        Reference Lab: [facility]    Location History: [facility]                 [address], [place], [place], [address]                                                            Verified:03/27/2014 12:30        Result: RESULT DO NOT SUPPORT A DIAGNOSIS OF CELIAC DISEASE    Result Comment: SEE SEPARATE REPORT FAXED TO PHYSICIAN OFFICE FOR FURTHER INFORMATION.          ====================  [facility]     MRI PANCREATIC CYST 3T (W+WO) (CPT=74183)  06/23/2017    Leon Bridges,  - 06/23/2017    MRI PANCREATIC CYST WITH AND WITHOUT IV CONTRAST    CLINICAL INFORMATION:  Cyst of pancreas.    COMPARISON STUDY: None.    ADVERSE REACTIONS: None.    IMAGING PROTOCOL:  Nonenhanced and enhanced multiplanar MR sequences of the abdomen were obtained, along with  nonenhanced MRCP sequences, using a dedicated high-resolution pancreatic cyst MR protocol, including  high-resolution focused-FOV multiphasic postcontrast T1-weighted axial images of the pancreatic  gland, utilizing a closed 3-Asia multichannel MR system.    Vial size: 10 mL  Injected amount: 7.5 mL  Ingested amount: 1 mL  Discarded amount: 1.5 mL    FINDINGS:  The T2-weighted sequences demonstrate an approximately 2.1 x 1.4 x 0.9 cm (craniocaudal, AP and  transverse dimensions, respectively) homogeneously hyperintense lesion in the posterior pancreas, at  the junction of the body and tail, representing a cystic morphology which may contain at least one  very thin internal septation. 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    There is no pancreatic ductal dilatation. The maximum diameter of the pancreatic duct at the level  of the tail, body and head are 1.7 mm, 3.2 mm and 3.5 mm, respectively.    The common bile duct is somewhat distended and measures up to 8 mm which is probably related to  prior cholecystectomy.    No intrahepatic biliary ductal dilatation is  demonstrated.    The distal aspect of the pancreatic duct is anterior to the intrapancreatic portion of the common  bile duct and apparently drains into the duodenum via a separate orifice (duct of Santorini and  minor ampulla, respectively). There appears to be an additional smaller duct, draining the  pancreatic head (pancreatic duct of Wirsung), which joins the distal intrapancreatic portion of the  common bile duct and drains into the duodenum via the major ampulla. The constellation of the  findings indicates morphologic changes of pancreas divisum.    The postcontrast images also show no abnormally enhancing lesions within the imaged area of the  abdomen. The above-mentioned pancreatic cyst does not show significant peripheral or central  enhancement.    The visualized segments of the liver, adrenals and spleen appear normal.    The pancreatic duct is also normal in caliber.    The kidneys show no hydronephrosis or perinephric collections.    No other mass lesions are seen within the abdomen.      IMPRESSION:    1. 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    2. Pancreas divisum.    3. Status post cholecystectomy.      Exam End: 06/23/17  6:13 PM     Received From: [facility]    ====================    03/20/2021  CT ABDOMEN PELVIS W WO CONTRAST     CLINICAL INDICATION: 70-year-old female, pancreatic cyst.     COMPARISON:  CT abdomen/pelvis with without contrast from 12/10/2020. CT   abdomen/pelvis from 11/14/2019.     TECHNIQUE: Axial CT images were obtained to the abdomen before and after   administration of 100 mL Omnipaque 350 IV contrast. Multiplanar reformats   were created. Delayed phase scans through the abdomen/pelvis were obtained.   mA and/or kVp was adjusted for patient size.     FINDINGS:     No pleural effusions. Heart size within normal limits, no pericardial   effusion.     No enhancing lesions  identified within the liver. Cholecystectomy changes.   Normal appearance of the intrahepatic and extra hepatic bile ducts.     Patent portal veins, splenic vein and visualized mesenteric veins.     Distal pancreatic body/tail cystic lesion measures 1.7 cm stable in size   and appearance when compared to 12/10/2020. This again appears to   communicate with the distal pancreatic duct.  Findings again may represent a   pancreatic pseudocyst versus cystic neoplasm such as IPMN. Redemonstrated   findings of pancreatic divisum. No other pancreatic lesions identified.     Spleen and adrenal glands appear within normal limits.     No calcified or obstructing or tract stones identified. No hydronephrosis.   Stable fatty 9 mm lesion in the anterior midportion of the left kidney   likely an angiomyolipoma. Additional simple appearing exophytic cyst   arising from the midportion of the left kidney measures 1.2 cm. These   appear stable from 12/10/2020.     Oral contrast partially opacifies the partially visualized colon. Retained   fecal material in the partially visualized colon. No obvious evidence of   bowel obstruction on the provided images. Sigmoid colon is mostly collapsed   and unopacified, limiting evaluation.     Nonspecific subcentimeter short axis retroperitoneal and mesenteric lymph   nodes are present, similar to prior exam.     Bladder not fully distended, limiting evaluation. Nonvisualized   uterus/adnexa, likely surgically absent.     Multilevel degenerative endplate changes degenerative disc disease and   osteophytosis the visualized spine. No acute fractures or dislocations   identified.     IMPRESSION:     No significant interval changes since 12/10/2020.     1. Stable 1.7 cm cystic lesion in the distal pancreatic body which appears   to communicate with the distal pancreatic duct. This again may represent   pseudocyst versus a pancreatic cystic neoplasm such as IPMN. Continued   imaging surveillance and/or  evaluation with US advised.     2. Stable findings of pancreatic divisum.     3. Small left renal angiomyolipoma, stable.         See above for additional findings/observations.     Electronically Signed by: HYACINTH CSATREJON DO   Signed on: 3/20/2021 1:00 PM     ====================  2/04/2022 : CT ABDOMEN PELVIS IV CONTRAST NO ORAL (ER)       COMPARISON: None.       INDICATIONS: Elevated liver enzymes.  Severe right upper quadrant pain.       TECHNIQUE: CT images of the abdomen and pelvis were obtained with non-ionic intravenous contrast material.        FINDINGS:   LIVER: Liver is diffusely hypoattenuating compared to the spleen, probable hepatic steatosis.  Liver is also mildly enlarged.  No focal suspicious appearing hepatic abnormality noted.  Portal vein is patent.   GALLBLADDER: Surgically absent.   BILIARY: Mild prominence of the CBD at 5-7 mm is likely related to post cholecystectomy state.  CBD appears smooth without any areas of narrowing or calcification along the CBD.  No intrahepatic biliary ductal dilatation is noted.   SPLEEN: No enlargement or focal lesion.     STOMACH: There is a tiny hiatal hernia.  Stomach is otherwise grossly unremarkable duodenum is grossly unremarkable.   PANCREAS: No inflammation or pancreatic ductal dilatation noted.  There is an oval cystic lesion within the posterior aspect of the pancreatic tail with a single thin internal septation that appears to be enhancing.  This lesion measures 13 x 11 x 24 mm (transverse by AP by craniocaudal).  No definite connection to the adjacent pancreatic duct.  Another lesion is seen within the neck of the pancreas measuring 8 mm (series 2, image 49), fat density, probably an area of interdigitated mesenteric fat.   ADRENALS: No defined mass or abnormal enlargement.     KIDNEYS: There is an 11 mm lesion within the anterior aspect of the left lower renal pole, fat density, compatible with an angiomyolipoma.  An exophytic 1.5 cm lesion at the  left kidney interpolar region measures simple fluid density by Hounsfield units,  compatible with a cyst. Additional scattered subcentimeter hypodensities in the kidneys are too small to fully characterize but statistically likely to reflect additional cysts.  Kidneys are negative for hydronephrosis and demonstrates symmetric enhancement.   AORTA/VASCULAR:   Mild calcific atherosclerosis.  No aneurysm or dissection.   RETROPERITONEUM: No mass or enlarged adenopathy.     BOWEL/MESENTERY: There is a moderate amount of stool throughout the colon.  Diverticular disease is seen involving the distal descending and sigmoid colon without focal inflammatory change to suggest diverticulitis.  Some diverticula of the cecum are also noted.  Appendix is not discretely visualized, compatible with patient's known history of prior appendectomy.  Small bowel loops are normal in caliber without dilated loops to suggest obstructive process.   ABDOMINAL WALL: There is a tiny fat containing umbilical hernia.  There is a tiny fat containing right and small to moderate fat containing left inguinal hernia.  A small area of skin induration which is somewhat linear is noted in the subcutaneous fat of the left inguinal region (series 2, image 134) may be related to prior surgery or vascular access.   URINARY BLADDER: No visible focal wall thickening, lesion or calculus.     PELVIC NODES: No enlarged mass or adenopathy.     PELVIC ORGANS: Patient is status post hysterectomy.  Adnexal tissue is still present bilaterally.  No gross abnormality is noted.   BONES:   Mild osteoarthritis of the lower lumbar spine facet joints and sacroiliac joints.  Bones appear diffusely demineralized.  Mild-to-moderate degenerative disc changes at the lower lumbar spine.  There is a well-defined peripherally thinly sclerotic 1.6 cm lucent lesion within the anterior aspect of the L4 vertebral body on the right, with a somewhat corduroy appearance of the internal  contents, most likely a vertebral body hemangioma.  No suspicious appearing osseous lesions are demonstrated.   LUNG BASES: Mild curvilinear opacities in the lung bases are probably combination of subsegmental atelectasis and scarring.   OTHER: Coronary artery calcifications are partially visualized.           CONCLUSION:       1. Enlarged liver with hepatic steatosis.       2. Postsurgical changes of cholecystectomy, appendectomy, and hysterectomy.       3. Colonic diverticulosis without evidence for acute diverticulitis.       4. Cystic lesion in the tail of the pancreas with thin internal likely enhancing septation, lesion measures 13 x 11 x 24 mm.  Follow-up imaging of this lesion with contrast-enhanced MR/MRCP of the abdomen is advised for further characterization.       5. Diffuse bone demineralization.  Lucent lesion in the L4 vertebral body.  This lesion appears most likely benign in the absence of a known history of underlying malignancy or multiple myeloma most likely represents a vertebral body hemangioma.       6. Additional incidental findings as outlined above.             Dictated by (CST): Elli Villatoro MD on 2/04/2022 at 1:17 PM       ====    2/06/2022 : MRI MRCP W/3D ONLY (CPT=76376/22659)   MRCP         COMPARISON: [facility], CT ABDOMEN PELVIS IV CONTRAST NO ORAL (ER), 2/04/2022, 1:02 PM.       INDICATIONS: LFTs elevated       TECHNIQUE: A comprehensive examination was performed utilizing a variety of imaging planes and imaging parameters to optimize visualization of suspected pathology.  Images were obtained without contrast.           MRCP FINDINGS:     GALLBLADDER:   Gallbladder surgically absent.   BILE DUCTS:   No evidence of intrahepatic or extrahepatic biliary ductal dilatation.  The common bile duct measures up to 0.6 cm.  No evidence of focal narrowing or filling defect.  Low insertion of cystic duct remanent is noted.   PANCREAS:   There is a cystic lesion pancreatic  tail which measures approximately 1.5 x 0.8 x 2.0 cm.  This appears to have some thin internal septation.  This also is suspected to communicate with the nondilated pancreatic duct.  Possible pancreas divisum.         OTHER FINDINGS:   LIVER: Mild loss of signal on the out of phase gradient echo sequence consistent with steatosis.   SPLEEN: Unremarkable.   ADRENALS: Unremarkable.   KIDNEYS:   No evidence of hydronephrosis.  1.5 cm exophytic interpolar left renal cyst noted.  There is also a fat containing lesion lower pole left kidney measuring 1.1 cm consistent with a angiomyolipoma.   VASCULAR: Visualized abdominal aorta is normal in caliber.   ADENOPATHY:   No bulky abdominal adenopathy.   ASCITES: None.     BOWEL: Visualized bowel is nondilated.   LUNG BASES: No significant abnormality.       CONCLUSION:       Cholecystectomy.  No evidence of intrahepatic or extrahepatic biliary ductal dilatation.       2 cm cystic lesion pancreatic tail which appears to communicate with the nondilated main pancreatic duct.  Please note the characterization is limited without intravenous contrast but this may reflect a side branch IPMN.  Follow-up nonemergent contrast enhanced MRCP can be performed when clinically appropriate.       Possible pancreas divisum.       Hepatic steatosis.       Small left renal cyst and left renal angiomyolipoma.       Lesser incidental findings as above.               Dictated by (CST): Braden Boston MD on 2/06/2022 at 9:51 AM         ======================  Outside MRI report scanned in under media tab:  Open MRI performed at [facility], only option for open MRI with IV contrast    MRI MRCP abdomen with and without contrast  [facility]  4/10/2023    \"Gallbladder/biliary: Prior cholecystectomy.  Mild central biliary prominence, likely related to prior cholecystectomy.  This is within normal limits for age and postcholecystectomy state, common bile duct measuring  up to 8 mm.  No dilation of the main pancreatic duct.  No obstruction identified.  Pancreas: Simple appearing cyst in the pancreatic tail measuring up to 1.9 cm. ...    IMPRESSION:  1.9 cm cyst in the pancreatic tail without suspicious features\"    ======================  Outside MRI report scanned in under media tab:  MRI performed at [facility]  4/15/2025    MRI abdomen with and without contrast, compared to uploaded images of previous MRI MRCP 4/10/2023    \"PANCREAS: Cysts of the pancreatic tail region identified measuring 9 x 15 mm, not significantly changed from the previous study with respect to size in [sic] morphology.  Craniocaudal dimension also measures 2 cm, which is also stable.  Grossly stable cyst-like component of dilation at the main pancreatic head level adjacent to the uncinate process approximately body of the pancreas measuring 6 x 5 mm.  Aside from related dilatation of the main pancreatic duct along the proximal body related to the 6 x 5 mm cyst, no significant pancreatic dilation or pancreatic body atrophy or enlargement.  Slight septal enhancement of the more proximal 6 x 5 mm cyst.\"    Cysts described as stable, no significant change.    ======================    [place] [address]    PATIENT NAME: KELLIE MUSE  YOB: 1950  MRN: [id number]  ATTENDING PHYSICIAN: Saleem Lemos M.D.  ROOM:    02/28/2014    PREOPERATIVE DIAGNOSIS(ES): Evaluation of diarrhea and screening.    POSTOPERATIVE DIAGNOSIS(ES): Moderate internal hemorrhoids, rare diverticular  disease with no evidence of polyps. Random biopsies were taken for  microscopic evidence of microscopic colitis.    OPERATION: Colonoscopy.    SURGEON: Saleem Lemos M.D.    INDICATIONS: Evaluation of diarrhea and screening.    PREMEDICATION GIVEN: 10 of Versed and 100 of fentanyl given IV.    DESCRIPTION OF PROCEDURE: After informed consent was obtained, the  risks and  benefits of the procedure were explained to the patient including, but not  limited to risk of bleeding, perforation, complication of sedation, and  possibility of missed lesions. The patient agreed to these risks and  benefits. She was placed in the left lateral decubitus position. Colonoscope  was passed into the cecum, good visualization.    FINDINGS: Cecum, ascending, transverse, descending, and sigmoid colon  appeared to be normal except for rare diverticular disease with the rectum  showing internal hemorrhoids. Random biopsies of the colon were taken for  evidence of microscopic colitis. Colonoscope was withdrawn. The patient  tolerated the procedure well.    IMPRESSION: Internal hemorrhoids and rare diverticular disease, otherwise  normal exam treatment.    PLAN: Await biopsies for evaluation of microscopic colitis. Review biopsy in  the outpatient setting. Make further recommendations at that time.    Saleem Lemos M.D.    Author ID: [id number]  Med / IJN: [id number] / Job#: [id number]  D: 02/28/2014 17:02:40  T: 03/01/2014 03:49:22    cc: Alicia Osuna M.D.      Electronically Signed On 03/10/2014 08:30 AM  __________________________________________________   MELIZA, SALEEM BARTLETT MD      PATH:    A. Colon, random biopsies:    -  Fragments of colonic mucosa with no specific histopathologic abnormality.        TC-2        Forest Sequeira M.D.        ====================    Op Note - Basil Erickson MD - 07/27/2017 12:52 AM CDT    [address] [address]    PATIENT NAME: KELLIE MUSE  YOB: 1950    DATE OF SURGERY: 07/26/2017    PREOPERATIVE DIAGNOSIS(ES): A very pleasant 67-year-old lady with a history  of pancreatic cyst that is enlarging in size apparently.    POSTOPERATIVE DIAGNOSIS(ES): Cyst in the body of the pancreas, fine-needle  aspiration x1 pass performed, about just over 1 mL of thick viscid fluid  aspirated, sent  for CEA, amylase, and cytology.    OPERATION: Endoscopic ultrasound-guided fine-needle aspiration.    SURGEON: JEWELS BARDALES M.D.    MEDICATION: Monitored anesthesia care.    DESCRIPTION OF PROCEDURE: With the patient lying in left lateral position, an  Olympus linear echoendoscope was advanced into the esophagus onwards into the  stomach. Neck, body, and tail of pancreas were examined from the stomach.  Head of pancreas uncinate from the duodenum. The pancreas was generally  unremarkable without any changes of chronic pancreatitis or calcification.  Pancreatic duct was normal in diameter. In the body tail junction area, a  cystic lesion was noted. This cyst contained a single septum. The cyst  measured 1.6 cm x 1.4 cm. No mural nodules were seen. No wall thickening was  noted. Only single septum was noted. The cyst was punctured using 22-gauge  HD FNA needle. One pass was made. About just over 1 mL of viscid fluid was  aspirated, which was total of 1.2 mL fluid. Slightly blood tinged. This was  placed all in 1 bottle and sent for CEA, amylase, and cytology. Air was  suctioned out. Scope withdrawn.    RECOMMENDATION: Followup with Dr. Saleem Lemos in approximately 3 weeks.    JEWELS BARDALES M.D.    Author ID: [id number]  MedQ / IJN: [id number] / Job#: [id number]  D: 2017 12:16:31  T: 2017 00:52:14    cc: MERRILL Reyes M.D.      Electronically Signed On 10/11/2017 08:51  __________________________________________________   JEWELS COLLINS    ====================    Esophagogastroduodenoscopy (EGD) & Endoscopic Ultrasound (EUS) Report           Yari LARA 7/3/1950 Age 71 year old   PCP Nahed Wyatt MD Endoscopist Sp Chu MD      Date of procedure: 2022     Procedure: EGD w/ biopsy and EUS esophagus, stomach, and duodenum with guided FNA     Pre-operative diagnosis: pancreas cyst; upper abdominal pain, abnormal MRCP     Post-operative diagnosis:  pancreas cyst, esophagitis     Sedation: Monitored anesthesia care (MAC)     Medication: levofloxacin 500 mg IV      Consent: We discussed the risks/benefits and alternatives to this procedure, as well as the planned sedation.  Informed consent was obtained from the patient after the risks of the procedure were discussed, including but not limited to bleeding, perforation, aspiration, infection, or possibility of a missed lesion as well as the risks of anesthesia including but not limited to cardiopulmonary complications. The patient signed informed consent and elected to proceed with EGD/EUS with intervention [i.e. Biopsy, control of bleeding, dilatation, FNA, polypectomy, endoscopic mucosal resection, etc.] as indicated.      EGD & EUS procedure: The lubricated tip of the [id number] diagnostic video upper endoscope was carefully inserted and advanced using direct visualization into the posterior pharynx and ultimately into the esophagus. After the EGD was performed, the gastroscope was removed and the echoendoscope was then carefully inserted through the oropharynx, esophagus intubated, then advanced to the stomach and descending duodenum.    Air was then withdrawn and the echoendoscope was removed. The patient tolerated the procedure well. There were no immediate postoperative complications. The patient’s vital signs were monitored throughout the procedure and remained stable.     Estimated blood loss: insignificant     Specimens collected:  Esophageal and gastric biopsies; pancreas cyst fluid aspirate     Complications: none     EGD findings:    1. Esophagus: The squamocolumnar junction was noted at 36 cm and appeared regular. There were two large rectangular shaped erosions in the distal esophagus most suggestive of reflux esophagitis. Two cold forceps biopsies were obtained. The esophageal mucosa appeared unremarkable otherwise.  2. Stomach: The stomach distended normally. Normal rugal folds were seen.  The pylorus was patent. There were a few small gastric antral erosions. The gastric mucosa appeared unremarkable otherwise. Multiple cold forceps biopsies were obtained to evaluate for h.pylori. Retroflexion revealed a normal fundus and cardia.   3. Duodenum: The duodenal mucosa appeared normal in the 1st and 2nd portion of the duodenum.      EUS findings:  A linear echoendoscope was used. The tail, body, neck, head, and uncinate process of the pancreas were fully evaluated. There was a 20.2 mm x 15.0 mm anechoic cyst with an internal septation in the tail of the pancreas. There was no mural nodule or solid component visualized. FNA was performed with a 22 gauge needle with one pass performed (using doppler to avoid intervening vessels) with aspiration of 4 mL of clear fluid into the syringe which was sent for fluid analysis and cytology. The pancreatic parenchyma was normal throughout otherwise. The pancreatic duct was normal in size measuring 1.1 mm at the body and 1.5 mm at the head of the pancreas. The common bile duct was normal in size measuring 5.4. it was traced several times from the ampulla to the hilum of the liver. There were no stones or sludge. There was not clear convergence of the pancreatic duct and bile duct at the ampulla. Otherwise, the ampulla appeared unremarkable endosonographically.      Impression:  1. A 20.2 mm x 15.0 mm anechoic cyst with an internal septation in the tail of the pancreas s/p FNA  2. Distal esophagitis  3. A few gastric erosions  4. Otherwise, unremarkable EGD and EUS     Recommend:  1. Await final pathology, cytology and fluid study results  2. Increase pantoprazole dose to 40 mg PO daily  3. Levofloxacin 500 mg PO x 3 days  4. Follow up with Dr. Cox     >>>If biopsies were performed and you have not received your pathology results either by phone or letter within 2 weeks, please call our office at [phone].     Discussed with daughter over the phone as requested      Sp Chu MD  [facility] - Gastroenterology  3/28/2022    ======================    ERCP PROCEDURE REPORT     DATE OF PROCEDURE:  12/27/2022     PCP: Nahed Wyatt MD     PREOPERATIVE DIAGNOSIS:  Colicky RUQ abdominal pain, abnormal LFTs, nausea     POSTOPERATIVE DIAGNOSIS: Dilated biliary tree, biliary sludge     SURGEON:  Rajiv Cox M.D.     SEDATION:    General Anesthesia with endotracheal intubation provided by the Anesthesia Service     Indomethacin 100mg suppository AL at conclusion of case        ERCP PROCEDURE:    After the nature and risks of ERCP examination under General Anesthesia with possible biliary sphincterotomy, possible pancreatic or biliary stent placement, possible stone extraction were discussed with the patient and questions answered, informed consent was obtained.     Ms. Solano was interviewed and assessed by the Anesthesia service.  The patient was brought back to the fluoroscopy suite and induced, intubated lying on their back by the Anesthesia service.  We then rolled Ms. Solano over into the usual \"swimmer\"  position, padding all pressure points.     Once intubated and positioned, the Olympus adult therapeutic duodenoscope was placed in the patient's mouth and advanced under direct visualization through the oropharynx into the esophagus, on down through the stomach suctioning out gastric contents and on through the pylorus to the duodenum.      Estimated blood loss: none/insignificant        ERCP FINDINGS:       Smooth circumferential symmetric stricture at the transition from duodenal bulb to second portion which was photographed.  Therapeutic ERCP duodenoscope navigated through this area with mild resistance.  Normal-appearing major papilla with unusually tight orifice, very difficult to engage today with the [employer] \"Dreamtome\" sphincterotomy catheter or free 0.035 inch rubber tipped guidewire; exchanging out for a 5-4-3 rigid biliary catheter  and then narrow caliber 0.025 inch wire Cook sphincterotome catheter also unsuccessful.  Ultimately using the 5-4-3 rigid catheter and multiple combinations of torque, tip deflection, scope rotation we cannulated biliary tree by free cannulation using that catheter.  Arc of the catheter and guidewire on fluoroscopy imaging confirmed biliary cannulation.  Contrast injection showed short cystic stump, diffuse mild-moderate extrahepatic biliary dilation but no discrete stricture or filling defects.  After advancing the 0.018 inch guidewire through the 5-4-3 catheter, we exchanged back for the [employer] \"Dreamtome\" sphincterotomy catheter; major papilla sphincterotomy then performed over the 0.018 inch guidewire with the [employer] \"Dreamtome\" sphincterotomy catheter and the ERBE surgical generator; no bleeding.  We swapped out our guidewire for the hydrophilic 0.035 inch guidewire.  The sphincterotome catheter was exchanged out over the wire and a 9-12 mm biliary balloon was advanced up into the common hepatic duct.  Several 12mm `balloon sweeps' yielded some fine sludge, at one point a possible stone which we lost in the duodenum.  12 mm balloon pulled through the sphincterotomy with mild resistance.  I elected to stop here.  The duodenoscope was drawn back to the stomach which was completely suctioned out and then the scope withdrawn from the patient.  Ms. Solano was extubated in the endoscopy suite and transferred to PACU in stable condition.  The pancreatic duct was not instrumented or injected during this procedure.     IMPRESSION:  Diffuse extrahepatic biliary dilation consistent with fine sludge possible stone at the major papilla, otherwise sphincter of Oddi dysfunction in this patient with recurring colicky pain and spikes in LFTs.  Successful major papilla sphincterotomy as above.  Question of pancreas divisum on imaging; pancreatic duct was not cannulated today.     RECOMMENDATIONS:      Aggressive IVF support with Lactated Ringers IVF   Clear liquid diet as tolerated; discharge home versus observation as clinical condition permits.  Prolonged cannulation attempts today as above place this patient at increased risk for post procedure pancreatitis.         Current Outpatient Medications   Medication Sig Dispense Refill    famotidine 40 MG Oral Tab Take 1 tablet (40 mg total) by mouth daily. 30 tablet 3    ipratropium 0.06 % Nasal Solution 2 sprays by Nasal route 3 (three) times daily. As needed for postnasal drainage. 15 mL 5    ASCOMP-CODEINE -94-30 MG Oral Cap Take 1 capsule by mouth every 4 to 6 hours as needed.      colestipol 1 g Oral Tab Take twice daily at least 2 hours  from any other medications 60 tablet 11    pantoprazole 40 MG Oral Tab EC Take 1 tablet (40 mg total) by mouth every morning before breakfast. 90 tablet 3    diphenoxylate-atropine 2.5-0.025 MG Oral Tab Take 1-2 tablets by mouth 3 (three) times daily as needed for Diarrhea. 60 tablet 3    topiramate 50 MG Oral Tab Take 1 tablet (50 mg total) by mouth nightly.      Potassium Chloride ER 10 MEQ Oral Tab CR Take 1 tablet (10 mEq total) by mouth daily.      hydroCHLOROthiazide 12.5 MG Oral Cap Take 1 capsule (12.5 mg total) by mouth daily.      DILT- MG Oral Capsule SR 24 Hr Take 1 capsule (120 mg total) by mouth daily.      ciprofloxacin 250 MG Oral Tab Take 1 tablet (250 mg total) by mouth 2 (two) times daily.      clobetasol 0.05 % External Cream For use with lichen planus orally twice daily for 7 days 30 g 2    rifAXIMin 550 MG Oral Tab Take 1 tablet (550 mg total) by mouth 3 (three) times daily. (Patient taking differently: Take 1 tablet (550 mg total) by mouth As Directed. Only once a year) 100 tablet 2    traMADol 50 MG Oral Tab Take 1 tablet (50 mg total) by mouth every 8 (eight) hours as needed for Pain. 30 tablet 1    clobetasol 0.05 % External Cream Apply 1 Application topically 2 (two) times  daily. 30 g 3    metFORMIN 500 MG Oral Tab Take 2 tablets (1,000 mg total) by mouth 2 (two) times daily with meals. 360 tablet 3    traMADol 50 MG Oral Tab Take 1 tablet (50 mg total) by mouth every 8 (eight) hours as needed for Pain. 30 tablet 0    telmisartan 80 MG Oral Tab Take 1 tablet (80 mg total) by mouth daily. (Patient taking differently: Take 1 tablet (80 mg total) by mouth nightly.) 90 tablet 3    estradiol (ESTRACE) 0.5 MG Oral Tab Take 1 tablet (0.5 mg total) by mouth daily. 90 tablet 1    Glucose Blood (FREESTYLE LITE TEST) In Vitro Strip Test Blood Sugar Once Daily. Non-Insulin Dependent Diabetes Type II. E11.9. 100 strip 3    Blood Glucose Monitoring Suppl (ACCU-CHEK TYRONE) Does not apply Device Test Blood Sugar Once Daily. Non-Insulin Dependent Diabetes Type II. E11.9. 1 Device 0    ACCU-CHEK SOFTCLIX LANCETS Does not apply Misc Test Blood Sugar Once Daily. Non-Insulin Dependent Diabetes Type II. E11.9. 100 each 3    Multiple Vitamins-Minerals (MULTI VITAMIN/MINERALS) Oral Tab Take 1 tablet by mouth daily.        Probiotic Product (VSL#3) Oral Cap Take by mouth daily.             Allergies:  Allergies   Allergen Reactions    Prednisone HIVES    Sulfa Antibiotics HIVES    Bactrim [Sulfamethoxazole W/Trimethoprim] MYALGIA    Ditropan [Oxybutynin] HIVES and RASH    Statins OTHER (SEE COMMENTS)     transaminitis    Lisinopril Coughing     Imaging: No results found.       PHYSICAL EXAM:   Physical Exam               Meds This Visit:  Requested Prescriptions      No prescriptions requested or ordered in this encounter       Imaging & Referrals:  None       ID#[id number]

## 2025-05-07 NOTE — TELEPHONE ENCOUNTER
Scheduled for:  EGD [id number] with possible esophageal dilation   Provider Name:     Date:  Wednesday, 07/23/2025   Location:  [facility]   Sedation:  Mac   Time:  2:30 am (pt is aware Cf will call with arrival time     Prep:  Npo   Meds/Allergies Reconciled?:  Yes    Diagnosis with codes:  Dyspepsia R10.13 GERD with dysphagia K21.9  Was patient informed to call insurance with codes (Y/N):  Yes     Referral sent?:  Referral was sent at the time of electronic surgical scheduling.    [facility] or [facility] notified?:  I sent an electronic request to Endo Scheduling and received a confirmation today.       Medication Orders:    Hold metformin and telmisartan day of procedure         Misc Orders:  None     Further instructions given by staff:  I discussed the prep instructions with the patient which she verbally understood and is aware that I will send the instructions today.via [place]

## 2025-05-07 NOTE — TELEPHONE ENCOUNTER
Patient was seen in office today with Dr. Cox and was given orders to schedule. Please call patient to schedule his/her procedure with the orders given below. Patient was given the phone number and prep instructions at the time of the office visit. The prep instructions was also explained to the patient at the time of the visit. The patient verbalized understanding the prep and that a GI  will call him/her for the procedure.  If patient calls before the 1 week turnaround please schedule with the orders given below, thank you!    Orders from Dr. Cox    Schedule EGD with possible esophageal dilation (stomach endoscopy) [facility]         BMI Readings from Last 1 Encounters:   05/07/25 26.53 kg/m²         MAC anesthesia     Diagnosis = Dyspepsia, GERD with dysphagia     Medication instructions:     Hold metformin and telmisartan day of procedure

## 2025-05-13 ENCOUNTER — TELEPHONE (OUTPATIENT)
Facility: CLINIC | Age: 75
End: 2025-05-13

## 2025-05-18 ENCOUNTER — LAB ENCOUNTER (OUTPATIENT)
Dept: LAB | Facility: HOSPITAL | Age: 75
End: 2025-05-18
Attending: INTERNAL MEDICINE
Payer: MEDICARE

## 2025-05-18 DIAGNOSIS — R10.13 EPIGASTRIC ABDOMINAL PAIN: ICD-10-CM

## 2025-05-18 LAB
ALBUMIN SERPL-MCNC: 4.6 G/DL (ref 3.2–4.8)
ALBUMIN/GLOB SERPL: 2.1 {RATIO} (ref 1–2)
ALP LIVER SERPL-CCNC: 47 U/L (ref 55–142)
ALT SERPL-CCNC: 7 U/L (ref 10–49)
ANION GAP SERPL CALC-SCNC: 9 MMOL/L (ref 0–18)
AST SERPL-CCNC: 10 U/L (ref ?–34)
BILIRUB SERPL-MCNC: 0.3 MG/DL (ref 0.2–1.1)
BUN BLD-MCNC: 27 MG/DL (ref 9–23)
BUN/CREAT SERPL: 26.7 (ref 10–20)
CALCIUM BLD-MCNC: 9.6 MG/DL (ref 8.7–10.4)
CHLORIDE SERPL-SCNC: 105 MMOL/L (ref 98–112)
CO2 SERPL-SCNC: 25 MMOL/L (ref 21–32)
CREAT BLD-MCNC: 1.01 MG/DL (ref 0.55–1.02)
EGFRCR SERPLBLD CKD-EPI 2021: 58 ML/MIN/1.73M2 (ref 60–?)
FASTING STATUS PATIENT QL REPORTED: YES
GLOBULIN PLAS-MCNC: 2.2 G/DL (ref 2–3.5)
GLUCOSE BLD-MCNC: 168 MG/DL (ref 70–99)
OSMOLALITY SERPL CALC.SUM OF ELEC: 297 MOSM/KG (ref 275–295)
POTASSIUM SERPL-SCNC: 3.9 MMOL/L (ref 3.5–5.1)
PROT SERPL-MCNC: 6.8 G/DL (ref 5.7–8.2)
SODIUM SERPL-SCNC: 139 MMOL/L (ref 136–145)

## 2025-05-18 PROCEDURE — 80053 COMPREHEN METABOLIC PANEL: CPT

## 2025-05-18 PROCEDURE — 36415 COLL VENOUS BLD VENIPUNCTURE: CPT

## 2025-05-25 ENCOUNTER — PATIENT MESSAGE (OUTPATIENT)
Facility: CLINIC | Age: 75
End: 2025-05-25

## 2025-05-27 ENCOUNTER — TELEPHONE (OUTPATIENT)
Facility: CLINIC | Age: 75
End: 2025-05-27

## 2025-05-28 NOTE — TELEPHONE ENCOUNTER
Dr Cox    Pt is inquiring about blood tests results    She completed a CMP on 05/18/2025    You ordered the CMP at her office visit on 05/07/2025 to follow up on previous elevation of liver enzymes

## 2025-05-31 NOTE — TELEPHONE ENCOUNTER
Reza Ms Solano!!    I was out of town this week.... sorry about the delay...    Very strange that you could not see your test results this time.    The blood tests that you did on 5/18/2025 showed:  Slightly elevated blood glucose/sugar 168  Slightly decreased kidney function with elevated \"BUN\" of 27.  That was similar on your blood tests of 1/9/2024.  That can go with being a bit dehydrated or taking water pills.  I do not see any water pills on our record of your prescriptions.  May be worth discussing your kidney numbers with your PCP whenever you get a chance.    Your liver numbers including the bile duct enzyme (\"alkaline phosphatase\") were perfect.  No sign of any trouble with your bile ducts on this blood test.    - Dr AMAYA

## 2025-06-04 ENCOUNTER — PATIENT MESSAGE (OUTPATIENT)
Facility: CLINIC | Age: 75
End: 2025-06-04

## 2025-06-04 RX ORDER — PANTOPRAZOLE SODIUM 40 MG/1
40 TABLET, DELAYED RELEASE ORAL
Qty: 90 TABLET | Refills: 3 | Status: SHIPPED | OUTPATIENT
Start: 2025-06-04

## 2025-06-23 DIAGNOSIS — K58.0 IRRITABLE BOWEL SYNDROME WITH DIARRHEA: ICD-10-CM

## 2025-06-23 RX ORDER — DIPHENOXYLATE HYDROCHLORIDE AND ATROPINE SULFATE 2.5; .025 MG/1; MG/1
1-2 TABLET ORAL 3 TIMES DAILY PRN
Qty: 60 TABLET | Refills: 0 | Status: SHIPPED | OUTPATIENT
Start: 2025-06-23

## 2025-07-07 ENCOUNTER — OFFICE VISIT (OUTPATIENT)
Dept: PHYSICAL MEDICINE AND REHAB | Facility: CLINIC | Age: 75
End: 2025-07-07
Payer: MEDICARE

## 2025-07-07 VITALS
HEIGHT: 60.25 IN | DIASTOLIC BLOOD PRESSURE: 72 MMHG | OXYGEN SATURATION: 99 % | SYSTOLIC BLOOD PRESSURE: 106 MMHG | WEIGHT: 137 LBS | BODY MASS INDEX: 26.55 KG/M2 | HEART RATE: 58 BPM

## 2025-07-07 DIAGNOSIS — M22.2X9 PATELLOFEMORAL PAIN SYNDROME, UNSPECIFIED LATERALITY: ICD-10-CM

## 2025-07-07 DIAGNOSIS — E11.9 TYPE 2 DIABETES MELLITUS WITHOUT COMPLICATION, WITHOUT LONG-TERM CURRENT USE OF INSULIN (HCC): Primary | ICD-10-CM

## 2025-07-07 DIAGNOSIS — Z96.652 STATUS POST LEFT PARTIAL KNEE REPLACEMENT: ICD-10-CM

## 2025-07-07 DIAGNOSIS — M17.10 PRIMARY OSTEOARTHRITIS OF KNEE, UNSPECIFIED LATERALITY: ICD-10-CM

## 2025-07-07 PROCEDURE — 99204 OFFICE O/P NEW MOD 45 MIN: CPT | Performed by: PHYSICAL MEDICINE & REHABILITATION

## 2025-07-07 NOTE — H&P
[facility]  Clinic H&P    Requesting Physician: Nahed Wyatt MD    CHIEF COMPLAINT:    Chief Complaint   Patient presents with    New Patient     New R handed pt presents with L knee pain beginning after knee surgery in . Denies injury. Reports sharp, tightness. Pain is 7/10. Denies N/T. Admits weakness. Taking tramadol. Reports completing PT in April. Admits hx of knee surgery. Denies hx of injections.       History of Present Illness  The patient, with diabetes, presents with worsening right knee pain following a partial knee replacement.    She underwent a partial knee replacement on her right knee in  and began experiencing problems about a year ago. The pain is severe and stiffness is localized to the lateral aspect of the right knee, occasionally radiating down the leg. Pain is rated as 6 out of 10 today, exacerbated by activities such as walking upstairs, which she can only do by stepping with one foot at a time due to inability to put pressure on the knee. No clicking, cracking, falls, or accidents are reported.    She has attended physical therapy twice, initially after the surgery and again from December to May, which provided temporary relief. She has not had any injections due to a previous experience where her blood sugar spiked to 500 following an injection, as she has diabetes. She uses a cane for ambulation and has a handicap placard, which recently .    For pain management, she takes tramadol as needed but has not taken it today. She does not use Tylenol or other over-the-counter pain medications.    In terms of physical activity, she engages in chair yoga four times a week and attempts to walk for 10-15 minutes daily to avoid stiffness, although she limits activity based on pain levels.       PAST MEDICAL HISTORY:  Past Medical History[1]    SURGICAL HISTORY:  Past Surgical History[2]    SOCIAL HISTORY:   Social History      Occupational History    Occupation: reitred    Tobacco Use    Smoking status: Former     Current packs/day: 0.00     Average packs/day: 0.3 packs/day for 30.0 years (9.9 ttl pk-yrs)     Types: Cigarettes     Start date: 1960     Quit date: 1990     Years since quittin.4     Passive exposure: Past    Smokeless tobacco: Never   Vaping Use    Vaping status: Never Used   Substance and Sexual Activity    Alcohol use: No     Alcohol/week: 0.0 standard drinks of alcohol    Drug use: No    Sexual activity: Not on file       FAMILY HISTORY:   Family History[3]    CURRENT MEDICATIONS:   Current Medications[4]    ALLERGIES:   Allergies[5]    REVIEW OF SYSTEMS:   Review of Systems   Constitutional: Negative.    HENT: Negative.    Eyes: Negative.    Respiratory: Negative.    Cardiovascular: Negative.    Gastrointestinal: Negative.    Genitourinary: Negative.    Musculoskeletal: As per HPI   Skin: Negative.    Neurological: As per HPI  Endo/Heme/Allergies: Negative.    Psychiatric/Behavioral: Negative.      All other systems reviewed and are negative. Pertinent positives and negatives noted in the HPI.      PHYSICAL EXAM:   /72   Pulse 58   Ht 60.25\"   Wt 137 lb (62.1 kg)   SpO2 99%   BMI 26.53 kg/m²     Body mass index is 26.53 kg/m².    General: No immediate distress  Head: Normocephalic/ Atraumatic  Eyes: Extra-occular movements intact.   Ears: No auricular hematoma or deformities  Mouth: No lesions or ulcerations  Heart: peripheral pulses intact. Normal capillary refill.   Lungs: Non-labored respirations  Abdomen: No abdominal guarding  Extremities: No lower extremity edema bilaterally   Skin: No lesions noted.   Cognition: alert & oriented x 3, attentive, able to follow 2 step commands, comprehension intact, spontaneous speech intact  Motor:    Musculoskeletal:    KNEE:  Inspection: no erythema, swelling, or obvious deformity  Palpation: Tender to palpation over the left medial joint line and  pes   ROM: Full active ROM in flexion and extension  Jeffy Test: negative for pain over patella  Lachmans: negative for instability  Anterior / Posterior drawer: negative for instability  Valgus/Varus stress test: negative for instablity  Td Test: negative for medial or lateral joint line pain or \"catch\"      Gait:  Normal    Data  [facility] Lab Encounter on 05/18/2025   Component Date Value Ref Range Status    Glucose 05/18/2025 168 (H)  70 - 99 mg/dL Final    Sodium 05/18/2025 139  136 - 145 mmol/L Final    Potassium 05/18/2025 3.9  3.5 - 5.1 mmol/L Final    Chloride 05/18/2025 105  98 - 112 mmol/L Final    CO2 05/18/2025 25.0  21.0 - 32.0 mmol/L Final    Anion Gap 05/18/2025 9  0 - 18 mmol/L Final    BUN 05/18/2025 27 (H)  9 - 23 mg/dL Final    Creatinine 05/18/2025 1.01  0.55 - 1.02 mg/dL Final    BUN/CREA Ratio 05/18/2025 26.7 (H)  10.0 - 20.0 Final    Calcium, Total 05/18/2025 9.6  8.7 - 10.4 mg/dL Final    Calculated Osmolality 05/18/2025 297 (H)  275 - 295 mOsm/kg Final    eGFR-Cr 05/18/2025 58 (L)  >=60 mL/min/1.73m2 Final    ALT 05/18/2025 7 (L)  10 - 49 U/L Final    AST 05/18/2025 10  <34 U/L Final    Alkaline Phosphatase 05/18/2025 47 (L)  55 - 142 U/L Final    Bilirubin, Total 05/18/2025 0.3  0.2 - 1.1 mg/dL Final    Total Protein 05/18/2025 6.8  5.7 - 8.2 g/dL Final    Albumin 05/18/2025 4.6  3.2 - 4.8 g/dL Final    Globulin  05/18/2025 2.2  2.0 - 3.5 g/dL Final    A/G Ratio 05/18/2025 2.1 (H)  1.0 - 2.0 Final    Patient Fasting for CMP? 05/18/2025 Yes   Final   [facility] Lab Encounter on 02/24/2025   Component Date Value Ref Range Status    Lipoprotein (a) 02/24/2025 12.2  <75.0 nmol/L Final    Cholesterol, Total 02/24/2025 187  <200 mg/dL Final    HDL Cholesterol 02/24/2025 48  40 - 59 mg/dL Final    Triglycerides 02/24/2025 195 (H)  30 - 149 mg/dL Final    LDL Cholesterol 02/24/2025 105 (H)  <100 mg/dL Final    VLDL 02/24/2025 33 (H)  0 - 30 mg/dL Final    Non HDL Chol 02/24/2025 139 (H)  <130 mg/dL Final     Patient Fasting for Lipid? 02/24/2025 Yes   Final   ]    Radiology Imaging:  NA    ASSESSMENT AND PLAN:  Yari is a pleasant 75-year-old female presents for complaints of left medial knee pain.  She is status post left partial replacement by Dr. Osorio in 2020.  I have ordered a CT scan of the left knee to evaluate for loosening of her hardware.  She has already completed a full course of physical therapy and has been taking tramadol if needed.  Her symptoms continue to be mostly in the medial aspect.  She will follow-up with me to review the CT scan and discuss next steps which may include a pes anserine bursa injection versus genicular nerve block and ablation.  She should use ice, tumeric, and Voltaren gel.     Assessment & Plan  Right knee pain post partial knee replacement  Chronic right knee pain post partial knee replacement, localized to the medial side. CT scan needed to assess potential hardware loosening. Avoid steroid injections due to hyperglycemia risk.  - Order CT scan of the right knee.  - Continue tramadol as needed for pain.  - Recommend Voltaren gel application.  - Advise ice for pain management.  - Suggest turmeric with black pepper, 1000 mg twice daily.  - Consider pes anserinus injection based on CT results.  - Consider geniculate nerve block/ablation if needed.    Diabetes mellitus without complications  Diabetes managed without complications. Avoid steroid injections due to hyperglycemia risk.    RTC in 4 to 6 weeks  Discharge Instructions were provided as documented in AVS summary.  The patient was in agreement with the assessment and plan.  All questions were answered.  There were no barriers to learning.         1. Type 2 diabetes mellitus without complication, without long-term current use of insulin (HCC)    2. Patellofemoral pain syndrome, unspecified laterality    3. Primary osteoarthritis of knee, unspecified laterality    4. Status post left partial knee replacement         Alex B. Behar MD  Physical Medicine and Rehabilitation/Sports Medicine  [facility]   [employer] Cures Act Notice to Patient: Medical documents like this are made available to patients in the interest of transparency. However, be advised this is a medical document and it is intended as [id number] communication between your medical providers. This medical document may contain abbreviations, assessments, medical data, and results or other terms that are unfamiliar. Medical documents are intended to carry relevant information, facts as evident, and the clinical opinion of the practitioner. As such, this medical document may be written in language that appears blunt or direct. You are encouraged to contact your medical provider and/or [facility] if you have any questions about this medical document.   Abridge tool was used for dictation purposes only and the patient was not recorded at any point during the visit.          [1]   Past Medical History:   Agatston coronary artery calcium score between 200 and 399    score 356    Cataract    Chronic diarrhea    Diabetic retinopathy of left eye (HCC)    High blood pressure    IBS (irritable bowel syndrome)    Incontinence    urine    Internal hemorrhoids    Migraines    Osteoarthritis    Other and unspecified hyperlipidemia    Pancreas cyst (HCC)    Type II or unspecified type diabetes mellitus without mention of complication, not stated as uncontrolled    Unspecified essential hypertension   [2]   Past Surgical History:  Procedure Laterality Date    Appendectomy      Cataract      Cataract extraction extracapsular w/ intraocular lens implantation Bilateral     Cholecystectomy      Colonoscopy   Dr. Lemos    Colonoscopy  2020    Dr. Lemos  normal    Hysterectomy      Knee replacement surgery Left 2020          Other surgical history      excision of lipomas     Total knee replacement Left 2020   [3]    Family History  Problem Relation Age of Onset    Cancer Mother         leukemia     Diabetes Mother     Other (CVD) Father     Other (? health) Sister     Other (migraines) Son     Breast Cancer Paternal Aunt 60        age at dx 60    Breast Cancer Paternal Aunt 50        Age of dx 50    Breast Cancer Paternal Aunt 60        Age of dx 60    Breast Cancer Paternal Aunt 65        Age of dx 65   [4]   Current Outpatient Medications   Medication Sig Dispense Refill    diphenoxylate-atropine 2.5-0.025 MG Oral Tab Take 1-2 tablets by mouth 3 (three) times daily as needed for Diarrhea. 60 tablet 0    pantoprazole 40 MG Oral Tab EC Take 1 tablet (40 mg total) by mouth every morning before breakfast. 90 tablet 3    famotidine 40 MG Oral Tab Take 1 tablet (40 mg total) by mouth daily. 30 tablet 3    ipratropium 0.06 % Nasal Solution 2 sprays by Nasal route 3 (three) times daily. As needed for postnasal drainage. 15 mL 5    ASCOMP-CODEINE -47-30 MG Oral Cap Take 1 capsule by mouth every 4 to 6 hours as needed.      colestipol 1 g Oral Tab Take twice daily at least 2 hours  from any other medications 60 tablet 11    topiramate 50 MG Oral Tab Take 1 tablet (50 mg total) by mouth nightly.      Potassium Chloride ER 10 MEQ Oral Tab CR Take 1 tablet (10 mEq total) by mouth daily.      hydroCHLOROthiazide 12.5 MG Oral Cap Take 1 capsule (12.5 mg total) by mouth daily.      DILT- MG Oral Capsule SR 24 Hr Take 1 capsule (120 mg total) by mouth daily.      ciprofloxacin 250 MG Oral Tab Take 1 tablet (250 mg total) by mouth 2 (two) times daily.      clobetasol 0.05 % External Cream For use with lichen planus orally twice daily for 7 days 30 g 2    rifAXIMin 550 MG Oral Tab Take 1 tablet (550 mg total) by mouth 3 (three) times daily. (Patient taking differently: Take 1 tablet (550 mg total) by mouth As Directed. Only once a year) 100 tablet 2    traMADol 50 MG Oral Tab Take 1 tablet (50 mg total) by mouth every 8  (eight) hours as needed for Pain. 30 tablet 1    clobetasol 0.05 % External Cream Apply 1 Application topically 2 (two) times daily. 30 g 3    metFORMIN 500 MG Oral Tab Take 2 tablets (1,000 mg total) by mouth 2 (two) times daily with meals. 360 tablet 3    traMADol 50 MG Oral Tab Take 1 tablet (50 mg total) by mouth every 8 (eight) hours as needed for Pain. 30 tablet 0    telmisartan 80 MG Oral Tab Take 1 tablet (80 mg total) by mouth daily. (Patient taking differently: Take 1 tablet (80 mg total) by mouth nightly.) 90 tablet 3    estradiol (ESTRACE) 0.5 MG Oral Tab Take 1 tablet (0.5 mg total) by mouth daily. 90 tablet 1    Glucose Blood (FREESTYLE LITE TEST) In Vitro Strip Test Blood Sugar Once Daily. Non-Insulin Dependent Diabetes Type II. E11.9. 100 strip 3    Blood Glucose Monitoring Suppl (ACCU-CHEK TYRONE) Does not apply Device Test Blood Sugar Once Daily. Non-Insulin Dependent Diabetes Type II. E11.9. 1 Device 0    ACCU-CHEK SOFTCLIX LANCETS Does not apply Misc Test Blood Sugar Once Daily. Non-Insulin Dependent Diabetes Type II. E11.9. 100 each 3    Probiotic Product (VSL#3) Oral Cap Take by mouth daily.        Multiple Vitamins-Minerals (MULTI VITAMIN/MINERALS) Oral Tab Take 1 tablet by mouth daily.       [5]   Allergies  Allergen Reactions    Prednisone HIVES    Sulfa Antibiotics HIVES    Bactrim [Sulfamethoxazole W/Trimethoprim] MYALGIA    Ditropan [Oxybutynin] HIVES and RASH    Statins OTHER (SEE COMMENTS)     transaminitis    Lisinopril Coughing

## 2025-07-07 NOTE — PATIENT INSTRUCTIONS
1) Please call and schedule your CT of the LEFT knee at [phone].  Once you have your CT scheduled, then call my office again to schedule a follow-up visit soon after your CT so we may review the images together.  2) Continue Tramadol if needed  3) Start turmeric with black pepper 1000 mg twice per day  4)  Ice 20 minutes at a time 3-4 times per day  5) We can consider a pes anserine injection depending on CT scan. We can also consider a geniculate nerve block/ablatio in the future if needed. There is a concern about BG going up  6) We will renew the Parking placard for 6 months

## 2025-07-07 NOTE — PROGRESS NOTES
The following individual(s) verbally consented to be recorded using ambient AI listening technology and understand that they can each withdraw their consent to this listening technology at any point by asking the clinician to turn off or pause the recording:    Patient name: Yari Solano  Additional names:     03-Nov-2024 11:24

## 2025-07-11 PROBLEM — I70.0 AORTIC ATHEROSCLEROSIS: Status: ACTIVE | Noted: 2022-11-18

## 2025-07-11 PROBLEM — E11.3291 MILD NONPROLIFERATIVE DIABETIC RETINOPATHY OF RIGHT EYE WITHOUT MACULAR EDEMA ASSOCIATED WITH TYPE 2 DIABETES MELLITUS (HCC): Status: ACTIVE | Noted: 2024-10-29

## 2025-07-14 ENCOUNTER — HOSPITAL ENCOUNTER (OUTPATIENT)
Dept: CT IMAGING | Facility: HOSPITAL | Age: 75
Discharge: HOME | End: 2025-07-14
Attending: PHYSICAL MEDICINE & REHABILITATION
Payer: MEDICARE

## 2025-07-14 DIAGNOSIS — M22.2X9 PATELLOFEMORAL PAIN SYNDROME, UNSPECIFIED LATERALITY: ICD-10-CM

## 2025-07-14 DIAGNOSIS — Z96.652 STATUS POST LEFT PARTIAL KNEE REPLACEMENT: ICD-10-CM

## 2025-07-14 DIAGNOSIS — M17.10 PRIMARY OSTEOARTHRITIS OF KNEE, UNSPECIFIED LATERALITY: ICD-10-CM

## 2025-07-14 DIAGNOSIS — E11.9 TYPE 2 DIABETES MELLITUS WITHOUT COMPLICATION, WITHOUT LONG-TERM CURRENT USE OF INSULIN (HCC): ICD-10-CM

## 2025-07-14 PROCEDURE — 73700 CT LOWER EXTREMITY W/O DYE: CPT | Performed by: PHYSICAL MEDICINE & REHABILITATION

## 2025-07-15 ENCOUNTER — TELEPHONE (OUTPATIENT)
Facility: CLINIC | Age: 75
End: 2025-07-15

## 2025-07-15 NOTE — TELEPHONE ENCOUNTER
Attempted to contact patient to confirm procedure on 7/23/25.  Left Voicemail to call back with any changes to medication, insurance or questions about the procedure.   Patient will receive another phone call with an arrival time within the week.

## 2025-07-23 PROBLEM — K22.2 ESOPHAGEAL STRICTURE: Status: ACTIVE | Noted: 2025-07-23

## 2025-07-24 ENCOUNTER — OFFICE VISIT (OUTPATIENT)
Dept: PHYSICAL MEDICINE AND REHAB | Facility: CLINIC | Age: 75
End: 2025-07-24
Payer: MEDICARE

## 2025-07-24 ENCOUNTER — TELEPHONE (OUTPATIENT)
Dept: PHYSICAL MEDICINE AND REHAB | Facility: CLINIC | Age: 75
End: 2025-07-24

## 2025-07-24 VITALS — HEIGHT: 60 IN | WEIGHT: 137 LBS | BODY MASS INDEX: 26.9 KG/M2

## 2025-07-24 DIAGNOSIS — M22.2X9 PATELLOFEMORAL PAIN SYNDROME, UNSPECIFIED LATERALITY: ICD-10-CM

## 2025-07-24 DIAGNOSIS — E11.9 TYPE 2 DIABETES MELLITUS WITHOUT COMPLICATION, WITHOUT LONG-TERM CURRENT USE OF INSULIN (HCC): Primary | ICD-10-CM

## 2025-07-24 DIAGNOSIS — Z96.652 STATUS POST LEFT PARTIAL KNEE REPLACEMENT: ICD-10-CM

## 2025-07-24 DIAGNOSIS — M17.10 PRIMARY OSTEOARTHRITIS OF KNEE, UNSPECIFIED LATERALITY: ICD-10-CM

## 2025-07-24 PROCEDURE — 99214 OFFICE O/P EST MOD 30 MIN: CPT | Performed by: PHYSICAL MEDICINE & REHABILITATION

## 2025-07-24 NOTE — PROGRESS NOTES
The following individual(s) verbally consented to be recorded using ambient AI listening technology and understand that they can each withdraw their consent to this listening technology at any point by asking the clinician to turn off or pause the recording:    Patient name: Yari Solano  Additional names:

## 2025-07-24 NOTE — PROGRESS NOTES
[facility]  Progress Note    CHIEF COMPLAINT:    Chief Complaint   Patient presents with    Follow - Up     LOV: 7/7/25 Pt here for f/u presents with L knee pain 5 years post TKA and CT results. Rates pain today 4/10. Denies NT. Admits weakness.  Current Medication: tramadol, tumeric w/ black pepper (not taking last 10 days due to endoscopy on 7/23/25)  CT available.    Patient gives verbal consent to use Abridge.        History of Present Illness  Yari Solano is a 75 year old female who presents with persistent knee pain post knee replacement.    She experiences persistent knee pain following a knee replacement surgery. The pain is described as being 'all over' the knee and extends down her leg to a specific point in the left medial tibia. The intensity of the pain varies, with some days being worse than others. Today, she experiences more discomfort and requires the use of a cane. She is unable to navigate stairs due to the knee pain.    She has a history of back pain and underwent an ablation on her lower back. However, she reports that she does not feel the current knee pain is coming from her back and that the pain does not radiate down her leg from the back. The pain is localized to the knee area.    Her past medical history includes a hemoglobin A1c of 6.5% as of January 23rd, with a previous reading of 7%. She is due for another test at the end of July. She mentions that a previous steroid injection caused her blood sugar to rise to 500 mg/dL.    She underwent an endoscopy the day before the visit and is still recovering from it. No cold symptoms are present despite a hoarse voice.       PAST MEDICAL HISTORY:  Past Medical History[1]    SURGICAL HISTORY:  Past Surgical History[2]    SOCIAL HISTORY:   Social History     Occupational History    Occupation: reitred    Tobacco Use    Smoking status: Former     Current packs/day: 0.00     Average packs/day:  0.3 packs/day for 30.0 years (9.9 ttl pk-yrs)     Types: Cigarettes     Start date: 1960     Quit date: 1990     Years since quittin.5     Passive exposure: Past    Smokeless tobacco: Never   Vaping Use    Vaping status: Never Used   Substance and Sexual Activity    Alcohol use: No    Drug use: No    Sexual activity: Not on file       FAMILY HISTORY:   Family History[3]    CURRENT MEDICATIONS:   Current Medications[4]    ALLERGIES:   Allergies[5]    REVIEW OF SYSTEMS:   Review of Systems   Constitutional: Negative.    HENT: Negative.    Eyes: Negative.    Respiratory: Negative.    Cardiovascular: Negative.    Gastrointestinal: Negative.    Genitourinary: Negative.    Musculoskeletal: As per HPI  Skin: Negative.    Neurological: As per HPI  Endo/Heme/Allergies: Negative.    Psychiatric/Behavioral: Negative.      All other systems reviewed and are negative. Pertinent positives and negatives noted in the HPI.    PHYSICAL EXAM:   Ht 60\"   Wt 137 lb (62.1 kg)   BMI 26.76 kg/m²     Body mass index is 26.76 kg/m².      General: No immediate distress  Head: Normocephalic/ Atraumatic  Eyes: Extra-occular movements intact.   Ears: No auricular hematoma or deformities  Mouth: No lesions or ulcerations  Heart: peripheral pulses intact. Normal capillary refill.   Lungs: Non-labored respirations  Abdomen: No abdominal guarding  Extremities: No lower extremity edema bilaterally   Skin: No lesions noted.   Cognition: alert & oriented x 3, attentive, able to follow 2 step commands, comprehension intact, spontaneous speech intact  Motor:    Musculoskeletal:        Data  Hospital Outpatient Visit on 2025   Component Date Value Ref Range Status    POC GLUCOSE 2025 150   Final   [facility] Lab Encounter on 2025   Component Date Value Ref Range Status    Glucose 2025 168 (H)  70 - 99 mg/dL Final    Sodium 2025 139  136 - 145 mmol/L Final    Potassium 2025 3.9  3.5 - 5.1 mmol/L Final     Chloride 05/18/2025 105  98 - 112 mmol/L Final    CO2 05/18/2025 25.0  21.0 - 32.0 mmol/L Final    Anion Gap 05/18/2025 9  0 - 18 mmol/L Final    BUN 05/18/2025 27 (H)  9 - 23 mg/dL Final    Creatinine 05/18/2025 1.01  0.55 - 1.02 mg/dL Final    BUN/CREA Ratio 05/18/2025 26.7 (H)  10.0 - 20.0 Final    Calcium, Total 05/18/2025 9.6  8.7 - 10.4 mg/dL Final    Calculated Osmolality 05/18/2025 297 (H)  275 - 295 mOsm/kg Final    eGFR-Cr 05/18/2025 58 (L)  >=60 mL/min/1.73m2 Final    ALT 05/18/2025 7 (L)  10 - 49 U/L Final    AST 05/18/2025 10  <34 U/L Final    Alkaline Phosphatase 05/18/2025 47 (L)  55 - 142 U/L Final    Bilirubin, Total 05/18/2025 0.3  0.2 - 1.1 mg/dL Final    Total Protein 05/18/2025 6.8  5.7 - 8.2 g/dL Final    Albumin 05/18/2025 4.6  3.2 - 4.8 g/dL Final    Globulin  05/18/2025 2.2  2.0 - 3.5 g/dL Final    A/G Ratio 05/18/2025 2.1 (H)  1.0 - 2.0 Final    Patient Fasting for CMP? 05/18/2025 Yes   Final   On license of [facility] Lab Encounter on 02/24/2025   Component Date Value Ref Range Status    Lipoprotein (a) 02/24/2025 12.2  <75.0 nmol/L Final    Cholesterol, Total 02/24/2025 187  <200 mg/dL Final    HDL Cholesterol 02/24/2025 48  40 - 59 mg/dL Final    Triglycerides 02/24/2025 195 (H)  30 - 149 mg/dL Final    LDL Cholesterol 02/24/2025 105 (H)  <100 mg/dL Final    VLDL 02/24/2025 33 (H)  0 - 30 mg/dL Final    Non HDL Chol 02/24/2025 139 (H)  <130 mg/dL Final    Patient Fasting for Lipid? 02/24/2025 Yes   Final   ]      Radiology Imaging:  I reviewed with the patient her CT of the knees left  CT KNEE LEFT (GOH=54714)  Narrative: EXAM: CT KNEE LEFT (CPT=73700)    CLINICAL INDICATION: Pain    COMPARISON: Radiographs from March 9, 2020    TECHNIQUE: Axial noncontrast CT images of the left knee were obtained. Coronal and sagittal reformatted images were obtained from the axial data.    FINDINGS:    There is a medial compartment prosthesis. Hardware components appear intact and there is no evidence of perihardware  lucency.    There is no acute fracture or dislocation.    Mild osteophyte formation noted in the lateral and patellofemoral compartments.    No joint effusion.    No Baker's cyst.  Impression: IMPRESSION:     Uncomplicated medial compartment prosthesis.    Mild patellofemoral and lateral compartment osteoarthritis.    Electronically Verified and Signed by Attending Radiologist: Virgil Ramsay MD 7/23/2025 3:34 PM  Workstation: [id number]      ASSESSMENT AND PLAN:  Yari is a pleasant 75-year-old female presents for follow-up of her left knee pain which I believe is due to an exacerbation of her osteoarthritis along with left pes anserine bursitis.  I am recommending a left genicular nerve block under fluoroscopy and local anesthesia.  If positive, then we can consider radiofrequency ablation to the genicular nerve.  If negative, then I would recommend a pes anserine bursa corticosteroid injection with assistance from her PCP to help us dose insulin given her previous history of hyperglycemia with a steroid injection.  The third option would be returning back to orthopedic surgery and considering a full replacement.     Assessment & Plan  Left knee pain post partial knee replacement  Persistent pain post-surgery with no hardware issues. Pain localized to knee and leg, not from back. Possible causes: cartilage degeneration, pes anserine bursitis, L4 nerve root pain. No effusion.  - Perform left knee genicular nerve block under local anesthesia to assess pain origin and potential for nerve ablation.  - If genicular nerve block is negative, consider left pes bursa injection with PCP assistance for insulin dosing due to hyperglycemia risk from steroid injection.  - Recommend reconsultation with orthopedics for possible knee injection versus replacement if other interventions are ineffective.    Arthritis of the knee  Arthritis persists despite partial replacement.  - Consider full knee replacement to eliminate  arthritis by replacing components.    Diabetes mellitus with hyperglycemia  Hyperglycemia with steroid injections, previously causing blood sugar to rise to 500 mg/dL. Managed by internist. Hemoglobin A1c was 6.5% in January.  - Coordinate with PCP for insulin dosing if steroid injection is considered, given history of hyperglycemia and potential for DKA.    Chronic back pain  Chronic lower back pain with history of ablation. Pain does not radiate down the leg, indicating it is not the source of knee pain.      RTC in 2 days after genicular nerve block  Discharge Instructions were provided as documented in AVS summary.  The patient was in agreement with the assessment and plan.  All questions were answered.  There were no barriers to learning.         1. Type 2 diabetes mellitus without complication, without long-term current use of insulin (HCC)    2. Patellofemoral pain syndrome, unspecified laterality    3. Primary osteoarthritis of knee, unspecified laterality    4. Status post left partial knee replacement        Alex B. Behar MD  Physical Medicine and Rehabilitation/Sports Medicine  [address] Cures Act Notice to Patient: Medical documents like this are made available to patients in the interest of transparency. However, be advised this is a medical document and it is intended as [id number] communication between your medical providers. This medical document may contain abbreviations, assessments, medical data, and results or other terms that are unfamiliar. Medical documents are intended to carry relevant information, facts as evident, and the clinical opinion of the practitioner. As such, this medical document may be written in language that appears blunt or direct. You are encouraged to contact your medical provider and/or [facility] if you have any questions about this medical document.   Abridge tool was used for dictation purposes only and the  patient was not recorded at any point during the visit.              [1]   Past Medical History:   Agatston coronary artery calcium score between 200 and 399    score 356    Arrhythmia    hear murmur    Cataract    Chronic diarrhea    Diabetic retinopathy of left eye (HCC)    High blood pressure    IBS (irritable bowel syndrome)    Incontinence    urine    Internal hemorrhoids    Migraines    Osteoarthritis    Other and unspecified hyperlipidemia    Pancreas cyst (HCC)    Type II or unspecified type diabetes mellitus without mention of complication, not stated as uncontrolled    Unspecified essential hypertension   [2]   Past Surgical History:  Procedure Laterality Date    Appendectomy      Cataract      Cataract extraction extracapsular w/ intraocular lens implantation Bilateral 2013    Cholecystectomy      Colonoscopy   Dr. Lemos    Colonoscopy  2020    Dr. Lemos  normal    Hysterectomy      Knee replacement surgery Left 2020          Other surgical history      excision of lipomas     Total knee replacement Left 2020    Upper gi endoscopy,exam     [3]   Family History  Problem Relation Age of Onset    Cancer Mother         leukemia     Diabetes Mother     Other (CVD) Father     Migraines Father     Other (? health) Sister     Other (migraines) Son     Breast Cancer Paternal Aunt 60        age at dx 60    Breast Cancer Paternal Aunt 50        Age of dx 50    Breast Cancer Paternal Aunt 60        Age of dx 60    Breast Cancer Paternal Aunt 65        Age of dx 65   [4]   Current Outpatient Medications   Medication Sig Dispense Refill    ezetimibe 10 MG Oral Tab Take 10 mg by mouth daily.      Bempedoic Acid (NEXLETOL) 180 MG Oral Tab Nexletol 180 mg tablet, [RxNorm: 8975992]      diphenoxylate-atropine 2.5-0.025 MG Oral Tab Take 1-2 tablets by mouth 3 (three) times daily as needed for Diarrhea. 60 tablet 0    pantoprazole 40 MG Oral Tab EC Take 1 tablet (40 mg total) by mouth every morning  before breakfast. 90 tablet 3    ipratropium 0.06 % Nasal Solution 2 sprays by Nasal route 3 (three) times daily. As needed for postnasal drainage. 15 mL 5    ASCOMP-CODEINE -09-30 MG Oral Cap Take 1 capsule by mouth every 4 to 6 hours as needed.      colestipol 1 g Oral Tab Take twice daily at least 2 hours  from any other medications 60 tablet 11    topiramate 50 MG Oral Tab Take 50 mg by mouth nightly.      Potassium Chloride ER 10 MEQ Oral Tab CR Take 10 mEq by mouth in the morning.      hydroCHLOROthiazide 12.5 MG Oral Cap Take 12.5 mg by mouth in the morning.      DILT- MG Oral Capsule SR 24 Hr Take 120 mg by mouth in the morning.      ciprofloxacin 250 MG Oral Tab Take 250 mg by mouth in the morning and 250 mg before bedtime.      clobetasol 0.05 % External Cream For use with lichen planus orally twice daily for 7 days 30 g 2    rifAXIMin 550 MG Oral Tab Take 1 tablet (550 mg total) by mouth 3 (three) times daily. (Patient taking differently: Take 1 tablet (550 mg total) by mouth As Directed. Only once a year) 100 tablet 2    traMADol 50 MG Oral Tab Take 1 tablet (50 mg total) by mouth every 8 (eight) hours as needed for Pain. 30 tablet 1    clobetasol 0.05 % External Cream Apply 1 Application topically 2 (two) times daily. 30 g 3    metFORMIN 500 MG Oral Tab Take 2 tablets (1,000 mg total) by mouth 2 (two) times daily with meals. 360 tablet 3    traMADol 50 MG Oral Tab Take 1 tablet (50 mg total) by mouth every 8 (eight) hours as needed for Pain. 30 tablet 0    telmisartan 80 MG Oral Tab Take 1 tablet (80 mg total) by mouth daily. (Patient taking differently: Take 80 mg by mouth nightly.) 90 tablet 3    estradiol (ESTRACE) 0.5 MG Oral Tab Take 1 tablet (0.5 mg total) by mouth daily. 90 tablet 1    Glucose Blood (FREESTYLE LITE TEST) In Vitro Strip Test Blood Sugar Once Daily. Non-Insulin Dependent Diabetes Type II. E11.9. 100 strip 3    Blood Glucose Monitoring Suppl (ACCU-CHEK TYRONE)  Does not apply Device Test Blood Sugar Once Daily. Non-Insulin Dependent Diabetes Type II. E11.9. 1 Device 0    ACCU-CHEK SOFTCLIX LANCETS Does not apply Misc Test Blood Sugar Once Daily. Non-Insulin Dependent Diabetes Type II. E11.9. 100 each 3    Multiple Vitamins-Minerals (MULTI VITAMIN/MINERALS) Oral Tab Take 1 tablet by mouth in the morning.      famotidine 40 MG Oral Tab Take 1 tablet (40 mg total) by mouth daily. 30 tablet 3    Probiotic Product (VSL#3) Oral Cap Take by mouth daily.       [5]   Allergies  Allergen Reactions    Prednisone HIVES    Sulfa Antibiotics HIVES    Bactrim [Sulfamethoxazole W/Trimethoprim] MYALGIA    Ditropan [Oxybutynin] HIVES and RASH    Statins OTHER (SEE COMMENTS)     transaminitis    Lisinopril Coughing

## 2025-07-24 NOTE — TELEPHONE ENCOUNTER
Per CMS Guidelines -no authorization is required for LEFT knee genicular nerve block.  CPT code(s): 46436     Status: Authorization is not required based on medical necessity however is not a guarantee of payment and may be subject to review once claim is submitted.    Documentation of pain reduction and functional improvement from prior injections is crucial.  Genicular nerve blocks are considered medically reasonable and necessary for chronic knee pain, especially when conservative treatments (like physical therapy, medications, and injections) have not provided sufficient relief.

## 2025-07-24 NOTE — PATIENT INSTRUCTIONS
1) My office will call you to schedule the LEFT knee genicular nerve block under local anesthesia once the procedure is approved by your insurance carrier.    2) IF positive., then we will perform a genicular nerve ablation.  3) If negative, then we will consider LEFT pes bursa injection with assistance from your PCP for insulin dosing given last steroid injection caused hyperglycemia and possible DKA  4) If pes bursa injection is not helpful, then would recommend reconsultation with ortho (Dr. DEMARCO Knight) for knee injection vs replacement     To ensure the accuracy of condition updates after your procedure, Dr. Behar would like for you to keep a written record of your pain for up to 12 hours after your injection. When you provide office staff with your condition update post injection - please refer back to this document for ease of communication.     Pain level prior to procedure: 0  1   2   3   4   5   6   7   8   9   10  (No Pain) 0  to  10 (Worst Pain); Please [place] corresponding number above.       Pain level immediately following procedure: 0  1   2   3   4   5   6   7   8   9   10  (No Pain) 0  to  10 (Worst Pain); Please [place] corresponding number above.       Please keep a close record of your pain for the next 12 hours to refer back to when speaking with office staff.      Percentage (%) of Relief:   0% - 100%  (0% = No Relief; 100% = Total Relief)   2 Hours After Procedure:     4 Hours After Procedure:     6 Hours After Procedure:     8 Hours After Procedure:     12 Hours After Procedure:

## 2025-07-29 ENCOUNTER — OFFICE VISIT (OUTPATIENT)
Dept: PHYSICAL MEDICINE AND REHAB | Facility: CLINIC | Age: 75
End: 2025-07-29
Payer: MEDICARE

## 2025-07-29 DIAGNOSIS — M22.2X9 PATELLOFEMORAL PAIN SYNDROME, UNSPECIFIED LATERALITY: ICD-10-CM

## 2025-07-29 DIAGNOSIS — Z96.652 STATUS POST LEFT PARTIAL KNEE REPLACEMENT: ICD-10-CM

## 2025-07-29 DIAGNOSIS — M17.10 PRIMARY OSTEOARTHRITIS OF KNEE, UNSPECIFIED LATERALITY: Primary | ICD-10-CM

## 2025-07-29 DIAGNOSIS — E11.9 TYPE 2 DIABETES MELLITUS WITHOUT COMPLICATION, WITHOUT LONG-TERM CURRENT USE OF INSULIN (HCC): ICD-10-CM

## 2025-07-29 PROCEDURE — 64454 NJX AA&/STRD GNCLR NRV BRNCH: CPT | Performed by: PHYSICAL MEDICINE & REHABILITATION

## 2025-07-29 RX ORDER — BUPIVACAINE HYDROCHLORIDE 2.5 MG/ML
3 INJECTION, SOLUTION INFILTRATION; PERINEURAL ONCE
Status: COMPLETED | OUTPATIENT
Start: 2025-07-29 | End: 2025-07-29

## 2025-07-29 RX ORDER — LIDOCAINE HYDROCHLORIDE 10 MG/ML
10 INJECTION, SOLUTION INFILTRATION; PERINEURAL ONCE
Status: COMPLETED | OUTPATIENT
Start: 2025-07-29 | End: 2025-07-29

## 2025-07-30 ENCOUNTER — PATIENT MESSAGE (OUTPATIENT)
Dept: PHYSICAL MEDICINE AND REHAB | Facility: CLINIC | Age: 75
End: 2025-07-30

## 2025-07-30 DIAGNOSIS — Z96.652 STATUS POST LEFT PARTIAL KNEE REPLACEMENT: ICD-10-CM

## 2025-07-30 DIAGNOSIS — E11.9 TYPE 2 DIABETES MELLITUS WITHOUT COMPLICATION, WITHOUT LONG-TERM CURRENT USE OF INSULIN (HCC): Primary | ICD-10-CM

## 2025-07-30 DIAGNOSIS — M17.10 PRIMARY OSTEOARTHRITIS OF KNEE, UNSPECIFIED LATERALITY: ICD-10-CM

## 2025-07-30 DIAGNOSIS — M22.2X9 PATELLOFEMORAL PAIN SYNDROME, UNSPECIFIED LATERALITY: ICD-10-CM

## 2025-08-01 ENCOUNTER — PATIENT MESSAGE (OUTPATIENT)
Dept: OTOLARYNGOLOGY | Facility: CLINIC | Age: 75
End: 2025-08-01

## 2025-08-01 RX ORDER — CLOBETASOL PROPIONATE 0.5 MG/G
1 EMULSION TOPICAL 2 TIMES DAILY
Qty: 60 G | Refills: 1 | Status: SHIPPED | OUTPATIENT
Start: 2025-08-01

## 2025-08-04 ENCOUNTER — TELEPHONE (OUTPATIENT)
Dept: PHYSICAL MEDICINE AND REHAB | Facility: CLINIC | Age: 75
End: 2025-08-04

## 2025-08-12 ENCOUNTER — TELEPHONE (OUTPATIENT)
Facility: CLINIC | Age: 75
End: 2025-08-12

## 2025-08-18 ENCOUNTER — PATIENT MESSAGE (OUTPATIENT)
Dept: PHYSICAL MEDICINE AND REHAB | Facility: CLINIC | Age: 75
End: 2025-08-18

## 2025-08-18 ENCOUNTER — OFFICE VISIT (OUTPATIENT)
Dept: PHYSICAL MEDICINE AND REHAB | Facility: CLINIC | Age: 75
End: 2025-08-18

## 2025-08-18 VITALS — RESPIRATION RATE: 14 BRPM | HEIGHT: 60 IN | WEIGHT: 137 LBS | BODY MASS INDEX: 26.9 KG/M2

## 2025-08-18 DIAGNOSIS — M22.2X9 PATELLOFEMORAL PAIN SYNDROME, UNSPECIFIED LATERALITY: ICD-10-CM

## 2025-08-18 DIAGNOSIS — E11.9 TYPE 2 DIABETES MELLITUS WITHOUT COMPLICATION, WITHOUT LONG-TERM CURRENT USE OF INSULIN (HCC): ICD-10-CM

## 2025-08-18 DIAGNOSIS — Z96.652 STATUS POST LEFT PARTIAL KNEE REPLACEMENT: ICD-10-CM

## 2025-08-18 DIAGNOSIS — M17.10 PRIMARY OSTEOARTHRITIS OF KNEE, UNSPECIFIED LATERALITY: Primary | ICD-10-CM

## (undated) DIAGNOSIS — R79.89 ABNORMAL LIVER FUNCTION TESTS: Primary | ICD-10-CM

## (undated) DIAGNOSIS — K80.50 RECURRENT BILIARY COLIC: ICD-10-CM

## (undated) DIAGNOSIS — R79.89 ABNORMAL LFTS (LIVER FUNCTION TESTS): Primary | ICD-10-CM

## (undated) DIAGNOSIS — R79.89 ABNORMAL LFTS: Primary | ICD-10-CM

## (undated) DIAGNOSIS — R74.8 ABNORMAL TRANSAMINASES: ICD-10-CM

## (undated) DEVICE — SUTURE VICRYL 2-0 FS-1

## (undated) DEVICE — SOL  .9 1000ML BTL

## (undated) DEVICE — Device

## (undated) DEVICE — SPHINCTEROTOME: Brand: DREAMTOME™ RX 44

## (undated) DEVICE — KIT CLEAN ENDOKIT 1.1OZ GOWNX2

## (undated) DEVICE — CEMENT MIXING SYSTEM WITH FEMORAL BREAKWAY NOZZLE: Brand: REVOLUTION

## (undated) DEVICE — POLAR CARE CUBE COOLING UNIT

## (undated) DEVICE — ENCORE® LATEX ACCLAIM SIZE 8, STERILE LATEX POWDER-FREE SURGICAL GLOVE: Brand: ENCORE

## (undated) DEVICE — YANKAUER SUCTION INSTRUMENT NO CONTROL VENT, BULB TIP, CLEAR: Brand: YANKAUER

## (undated) DEVICE — ZIMMER® STERILE DISPOSABLE TOURNIQUET CUFF WITH PLC, DUAL PORT, SINGLE BLADDER, 30 IN. (76 CM)

## (undated) DEVICE — SOL  .9 1000ML BAG

## (undated) DEVICE — T5 HOOD WITH PEEL AWAY FACE SHIELD

## (undated) DEVICE — KIT DRN 1/8IN PVC 3 SPRG EVAC

## (undated) DEVICE — KIT ENDO ORCAPOD 160/180/190

## (undated) DEVICE — WRAP COOLING KNEE W/ICE PILLOW

## (undated) DEVICE — LINE MNTR ADLT SET O2 INTMD

## (undated) DEVICE — SOL  .9 3000ML

## (undated) DEVICE — CANNULATING SPHINCTEROTOME: Brand: TRUETOME™ REVOLUTION 39

## (undated) DEVICE — Device: Brand: STABLECUT®

## (undated) DEVICE — RETRIEVAL BALLOON CATHETER: Brand: EXTRACTOR™ PRO RX

## (undated) DEVICE — GAUZE SPONGES,12 PLY: Brand: CURITY

## (undated) DEVICE — TOTAL KNEE: Brand: MEDLINE INDUSTRIES, INC.

## (undated) DEVICE — 3M™ STERI-STRIP™ REINFORCED ADHESIVE SKIN CLOSURES, R1547, 1/2 IN X 4 IN (12 MM X 100 MM), 6 STRIPS/ENVELOPE: Brand: 3M™ STERI-STRIP™

## (undated) DEVICE — SUTURE VICRYL 0 CP-1

## (undated) DEVICE — SOL H2O 1000ML BTL

## (undated) DEVICE — 25MM FEMALE SCREW-Z

## (undated) DEVICE — CYSTO PACK: Brand: MEDLINE INDUSTRIES, INC.

## (undated) DEVICE — CONMED SCOPE SAVER BITE BLOCK, 20X27 MM: Brand: SCOPE SAVER

## (undated) DEVICE — BLADE SAW SAGITTAL 19.5

## (undated) DEVICE — SUTURE ETHIBOND 1-0 CX30D

## (undated) DEVICE — FORCEP RADIAL JAW 4

## (undated) DEVICE — UROLOGY DRAIN BAG

## (undated) DEVICE — 35 ML SYRINGE REGULAR TIP: Brand: MONOJECT

## (undated) DEVICE — MEDI-VAC NON-CONDUCTIVE SUCTION TUBING: Brand: CARDINAL HEALTH

## (undated) DEVICE — Device: Brand: BALLOON3

## (undated) DEVICE — NEEDLE INJ 35CM RGD INJETAK 25

## (undated) DEVICE — SIDE ARM ADAPTER: Brand: COOK

## (undated) DEVICE — PROXIMATE SKIN STAPLERS (35 WIDE) CONTAINS 35 STAINLESS STEEL STAPLES (FIXED HEAD): Brand: PROXIMATE

## (undated) DEVICE — CHLORAPREP 26ML APPLICATOR

## (undated) DEVICE — MEDI-VAC NON-CONDUCTIVE SUCTION TUBING 6MM X 1.8M (6FT.) L: Brand: CARDINAL HEALTH

## (undated) DEVICE — STERILE LATEX POWDER-FREE SURGICAL GLOVESWITH NITRILE COATING: Brand: PROTEXIS

## (undated) DEVICE — GUIDEWIRE NOVAGOLD STRGHT TIP

## (undated) DEVICE — CONTAINER UBG 200ML POR CLTH

## (undated) DEVICE — PAD THRP 16IN WRPON MU LNG STM

## (undated) DEVICE — BATTERY

## (undated) DEVICE — LOCKING DEVICE RX & BIOPSY CAP

## (undated) DEVICE — ERCP CANNULA - 5-4-3 TIP: Brand: CONTOUR ERCP CANNULA

## (undated) DEVICE — ADHESIVE MASTISOL 2/3CC VL

## (undated) NOTE — LETTER
Anita Joe Md  [address] [address], [address]       01/26/18        Patient: Verena Martinez   YOB: 1950   Date of Visit: 1/26/2018       Dear  Dr. Wanda Wong MD,      Thank you for referring

## (undated) NOTE — LETTER
[address] [place], [place]      Authorization for Surgical Operation and Procedure     Date:___________                                                                                                         Time:__________  1. I hereby Hanh Grossman MD, my physician and his/her assistants (if applicable), which may include medical students, residents, and/or fellows, to perform the following surgical operation/ procedure and administer such anesthesia as may be determined necessary by my physician:  Operation/Procedure name (s) ESOPHAGOGASTRODUODENOSCOPY (EGD)/ENDOSCOPIC ULTRASOUND (EUS) with possible fine needle aspiration  on [address]   2. I recognize that during the surgical operation/procedure, unforeseen conditions may necessitate additional or different procedures than those listed above. I, therefore, further authorize and request that the above-named surgeon, assistants, or designees perform such procedures as are, in their judgment, necessary and desirable. 3.   My surgeon/physician has discussed prior to my surgery the potential benefits, risks and side effects of this procedure; the likelihood of achieving goals; and potential problems that might occur during recuperation. They also discussed reasonable alternatives to the procedure, including risks, benefits, and side effects related to the alternatives and risks related to not receiving this procedure. I have had all my questions answered and I acknowledge that no guarantee has been made as to the result that may be obtained. 4.   Should the need arise during my operation or immediate post-operative period, I also consent to the administration of blood and/or blood products.   Further, I understand that despite careful testing and screening of blood or blood products by collecting agencies, I may still be subject to ill effects as a result of receiving a blood transfusion and/or blood products. The following are some, but not all, of the potential risks that can occur: fever and allergic reactions, hemolytic reactions, transmission of diseases such as Hepatitis, AIDS and Cytomegalovirus (CMV) and fluid overload. In the event that I wish to have an autologous transfusion of my own blood, or a directed donor transfusion. I will discuss this with my physician. 5.   I authorize the use of any specimen, organs, tissues, body parts or foreign objects that may be removed from my body during the operation/procedure for diagnosis, research or teaching purposes and their subsequent disposal by hospital authorities. I also authorize the release of specimen test results and/or written reports to my treating physician on the hospital medical staff or other referring or consulting physicians involved in my care, at the discretion of the Pathologist or my treating physician. 6.   I consent to the photographing or videotaping of the operations or procedures to be performed, including appropriate portions of my body for medical, scientific, or educational purposes, provided my identity is not revealed by the pictures or by descriptive texts accompanying them. If the procedure has been photographed/videotaped, the surgeon will obtain the original picture, image, videotape or CD. The hospital will not be responsible for storage, release or maintenance of the picture, image, tape or CD.    7.   I consent to the presence of a  or observers in the operating room as deemed necessary by my physician or their designees. 8.   I recognize that in the event my procedure results in extended X-Ray/fluoroscopy time, I may develop a skin reaction. 9.  If I have a Do Not Attempt Resuscitation (DNAR) order in place, that status will be suspended while in the operating room, procedural suite, and during the recovery period unless otherwise explicitly stated by me (or a person authorized to consent on my behalf). The surgeon or my attending physician will determine when the applicable recovery period ends for purposes of reinstating the DNAR order. 10. Patients having a sterilization procedure: I understand that if the procedure is successful the results will be permanent and it will therefore be impossible for me to inseminate, conceive, or bear children. I also understand that the procedure is intended to result in sterility, although the result has not been guaranteed. 11. I acknowledge that my physician has explained sedation/analgesia administration to me including the risk and benefits I consent to the administration of sedation/analgesia as may be necessary or desirable in the judgment of my physician. I CERTIFY THAT I HAVE READ AND FULLY UNDERSTAND THE ABOVE CONSENT TO OPERATION and/or OTHER PROCEDURE.    _________________________________________  __________________________________  Signature of Patient     Signature of Responsible Person         ___________________________________         Printed Name of Responsible Person           _________________________________                  Relationship to Patient  _________________________________________  ______________________________  Signature of Witness          Date  Time    STATEMENT OF PHYSICIAN My signature below affirms that prior to the time of the procedure; I have explained to the patient and/or his/her legal representative, the risks and benefits involved in the proposed treatment and any reasonable alternative to the proposed treatment. I have also explained the risks and benefits involved in refusal of the proposed treatment and alternatives to the proposed treatment and have answered the patient's questions.  If I have a significant financial interest in a co-management agreement or a significant financial interest in any product or implant, or other significant relationship used in this procedure/surgery, I have disclosed this and had a discussion with my patient.     _______________________________________________________________ _____________________________  Anders Roof of Physician)                                                                                         (Date)                                   (Time)        Patient Name: Ria Aranda    : 7/3/1950   Printed: 3/26/2022      Medical Record #: [id number]                                              Page 1 of 1

## (undated) NOTE — LETTER
[facility]     [] NEW APPLICANT  [address] [place] [address]  [x] RENEWAL            Persons with Disabilities Certification for Parking Placard  *This form is valid for three months from your physician's signature date for a Temporary Placard and six months for a Permanent Placard.    NOTE TO ALL DISABILITY LICENSE PLATE OWNERS:  If you have a disability license plate, you MUST complete the form and renew your placard.    DIRECTIONS: Both sides of this document must be signed and completed fully. All fields are required.   Applicants complete Part 1. If the applicant is a MINOR, then Parent/Guardian(s) MUST also complete Part 2. The applicant's medical professional MUSTcomplete Part 3. If the applicant is applying for meter-exempt parking, his/her medical professional MUST also complete Part 4.    PART 1:   Applicant Information (MUST have a valid [place] 's license and/or ID card)  I hereby certify  that I meet the definition of a person with a disability as provided in [address] [place] [address] 5/1-159.1, and I certify  that my physical condition entitles me to the issuance of a Persons with Disabilities Parking Placard. By affixing my signature below, I understand that the parking placard may not be used unless I am the  or passenger of the vehicle.     *If a  , please provide a copy of your  showing proof of service.     Disability Parking Placard # (if any)     Full Name of Person with Disability (If minor, complete Part 2 also)    Yari Solano  Male/Female  female  Date of Birth   7/3/1950    Valid [place] ’s License or ID Card # of Applicant                                                                                                  [place] Address  [address] [place] [address]    Mailing Address if Different from Above   Telephone Number  [phone] (home)  Email Address   [email or web address]  ? Yes/No      Signature of Person with Disability Today’s Date  7/7/2025     PART 2:   For Parent or Legal Guardian (MUST have a valid 's license and/or ID card)  I hereby certify that the above applicant is a minor and I have primary responsibility for his/her transportation. By affixing my signature below, I understand that the disability placard is issued to the person with the disability and may not be used unless I am transporting the disabled person in the vehicle.     Name of Parent or Legal Guardian   Relationship to Person with Disability   Valid [place] ’s License or ID Card #          [place] Address [address]pt/Unit# City State Zip   Telephone Number Email Address   Signature of Parent or Legal Guardian Today’s Date  7/7/2025     Warning: Any misuse of the disability parking placard/plates or making a false application may result in the revocation of the placard, a 12-month suspension or revocation of your drivers license, and a fine of up to $1,000.    Temporary Disabled Parking Placard Applications -- May be taken to any [facility] facility or mailed in.  Permanent Disables Parking Placard Applications -- MUST be mailed to the following address:  , Persons with Disabilities Placard Unit, [address] [place] [address], [address], [place] [address].    *If you have a permanent disability placard and would like a Persons with Disabilities License Plate, please visit your local  facility to apply. You will need your permanent placard number and current plate number or [email or web address].     Please complete Page 2 to ensure timely processing.    Printed by authority of the [facility]. July 2021 -- 1 -- VSD 62.28          Part 3: Medical Eligibility Standards and Medical Professionals Certification  As the medical professional(s) executing this document and verifying the nature of the applicant's disability, I understand that making a false representation of a person's disability for  the purposes of obtaining any type of a disabled parking placard may result in suspension or revocation of my license and a fine of up to $1,000. As a licensed physician, advanced practice nurse, optometrist, chiropractor or physician's assistant, I certify the applicant has a condition that constitutes him/her as a person with disabilities.   Length of Disability: (Check one)   [x]   Temporary Disability; the duration of this disability is _____6 months________________ (maximum 6 months)  []   Permanent Disability  []   Meter-Exempt Disability (Must complete and sign Part 4 also.)  Check all that apply (MUST check at least one):  []  Is restricted by a lung disease to such a degree that the person’s forced (respiratory) expiratory volume (FEV) for 1 second, when measured by spirometry, is less than 1 liter.  []   Uses a portable oxygen device.  []   Has a Class III or Class IV cardiac condition according to the standards set by the American Heart Association.  []   Cannot walk without the use of or assistance from a wheelchair, a walker, a crutch, a brace, a prosthetic device or another person.  []   Is severely limited in the ability to walk due to an arthritic, neurological, oncological or orthopedic condition.  [x]   Cannot walk 200 feet without stopping to rest because of one of the above five conditions.  Check all that apply: (MUST check at least one diagnosis):  []Amputation of extremity(s)_________________ [] Arthritis of the ____knee osteoarthritis__________________  []Spina Bifida     [x]Osteoarthritis of the ____knee______________   []Multiple Sclerosis    [] Chronic Pain due to ___________________  []Quadriplegia/Paraplegia   [] Legally Blind with limited mobility  [] Cerebral Palsy  [] Other Diagnosis: _________________________________________________________________    If none of the above conditions apply, list the medical condition that impacts the person’s mobility.     Medical Professional’s  Printed Name*   Alex Behar, MD Specialty     Office Address   [facility], [place]  [address]  [facility] [address]  Dept: [phone]  Dept Fax: [phone]   Medical Professional’s Signature   State Professional License Number (NOT NPI#)  [id number] Today’s Date                  7/7/2025    Signature of Collaborating/Supervising Physician (if signed above by resident/assistant) Supervising State Professional License Number             PART 4: Medical Eligibility for Meter-Exempt Parking  The meter-exempt parking certification must be completed only when the applicant qualifies. To qualify, the applicant MUST have a VALID  [place] 's license, have an ambulatory disability described in Part 3, and also have one of the following conditions listed below.  Economic need is not a consideration for meter-exempt parking    The applicant is eligible for meter-exempt parking as provided by statue due to the following PERMANENT medical condition or disability:    Check all that apply:  [] Cannot manage, manipulate, or insert coins, or obtain tickets in parking meters/ticket machines due to lack of fine motor control of BOTH hands.  [] Cannot reach above his/her head to a height of 42 inches from the ground due to a lack of finger, hand or upper-extremity strength or mobility.  [] Cannot approach a parking meter due to his/her use of a wheelchair or other device for mobility.  [] Cannot walk more than 20 feet due to an orthopedic, neurological, cardiovascular, or lung condition in which the degree of debilitation is so severe that it almost completely impedes the ability to walk.  [] Missing a hand(s) or arm(s) or has permanently lost the use of a hand or arm.  [] Patient is under 18 years of age and incapable of driving.     Medical Professional’s Signature State Professional License Number (NOT NPI#)   Today’s Date                  7/7/2025    Signature of  Collaborating/Supervising Physician (if signed above by resident/assistant) Supervising State Professional License Number     FOR  OFFICE USE ONLY     Parking Placard Number:  Expiration Date:   Issued By: Issued Date:

## (undated) NOTE — LETTER
[facility]  Administration of Anesthesia  1. roscoe Garland _________________________________ acting on her behalf, (Patient) (Dependent/Representative) request to receive anesthesia for my pending procedure/operation/treatment. A physician (anesthesiologist) alone or an anesthesiologist working with a nurse anesthetist may administer my anesthesia. 2. I understand that my anesthesiologist is not an employee or agent of the hospital, but is an independent medical practitioner who has been permitted to use its facilities for the care and treatment of his/her patients. 3. I acknowledge that a physician from [facility] or their designate(s), recommended anesthesia for me using her/his medical judgment. The type(s) of anesthesia I may receive include:                a) General Anesthesia, b) Spinal/Epidural Anesthesia, c) Regional Anesthesia or d) Monitored Anesthesia Care. 4. If my spinal, regional or monitored anesthesia care (local) is not satisfactory for my comfort, or if my medical condition requires, I consent to the administration of general anesthesia. 5. I am aware that the practice of anesthesiology is not an exact science and that some foreseeable risks or consequences may occur. Some common risks/consequences include sore throat and hoarseness, nausea and vomiting, muscle soreness, backache, damage to the mouth/teeth/vocal cords and eye injury. I understand that more rare but serious potential risks of anesthesia include blood pressure changes, drug reactions, cardiac arrest, brain damage, paralysis or death. These risks apply to whether I have general, spinal/epidural, regional or monitored anesthesia care. 6. OBSTETRIC PATIENTS: Specific risks/consequences of spinal/epidural anesthesia may include itching, low blood pressure, difficulty urinating, slowing of the baby's heart rate and headache.  Rare risks include infections, high spinal block, spinal bleeding, seizure, cardiac arrest and death. 7. AWARENESS: I understand that it is possible (but unlikely) to have explicit memory of events from the operating room while under general anesthesia. 8. ELECTROCONVULSIVE THERAPY PATIENTS: This consent serves for all treatments in a single course of therapy. 9. I understand that I must inform my anesthesiologist when I last ate and/or drank to minimize the risk of anesthesia. 10. If I am pregnant, or may pregnant, I understand that elective surgery should be postponed until after the baby is born. Anesthetics cross the placenta and may temporarily anesthetize the baby. Although fetal complications of anesthesia during pregnancy are rare, they may include birth defects, premature labor, brain damage and death. 11. I certify that I informed the anesthesiologist, to the best of my ability, about medication I take including blood thinners, anticoagulants, herbal remedies, narcotics and recreational drugs (e.g. cocaine, marijuana, PCP). Failure to inform my anesthesiologist about these medicines may increase my risk of anesthetic complications. The nature and purpose of my anesthetic management was explained to me. I had the opportunity to ask questions and the answers and information provided meet my satisfaction.   I retain the right to withdraw this consent at any time prior to the administration of said anesthetic.    ___________________________________________________           _____________________________________________________  Patient Signature                                                                                      Witness Signature                ___________________________________________________           _____________________________________________________  Date/Time                                                                                               Responsible person in case of minor/ unconscious pt /Relationship    My signature below affirms that prior to the time of the procedure, I have explained to the patient and/or his/her guardian, the risks and benefits of undergoing anesthesia, as well as any reasonable alternatives.     ___________________________________________________            _____________________________________________________  Physician Signature                            Date/Time  Patient Name: Aisf Shya     : 7/3/1950     Printed: 3/26/2022      Medical Record #: [id number]                              Page 1 of 1    ----------ANESTHESIA CONSENT----------